# Patient Record
Sex: MALE | Race: WHITE | HISPANIC OR LATINO | Employment: FULL TIME | URBAN - METROPOLITAN AREA
[De-identification: names, ages, dates, MRNs, and addresses within clinical notes are randomized per-mention and may not be internally consistent; named-entity substitution may affect disease eponyms.]

---

## 2020-06-19 ENCOUNTER — HOSPITAL ENCOUNTER (EMERGENCY)
Facility: MEDICAL CENTER | Age: 30
End: 2020-06-19
Attending: EMERGENCY MEDICINE
Payer: COMMERCIAL

## 2020-06-19 ENCOUNTER — APPOINTMENT (OUTPATIENT)
Dept: RADIOLOGY | Facility: MEDICAL CENTER | Age: 30
End: 2020-06-19
Attending: EMERGENCY MEDICINE
Payer: COMMERCIAL

## 2020-06-19 VITALS
TEMPERATURE: 97.7 F | WEIGHT: 195 LBS | BODY MASS INDEX: 27.2 KG/M2 | DIASTOLIC BLOOD PRESSURE: 66 MMHG | SYSTOLIC BLOOD PRESSURE: 108 MMHG | HEART RATE: 98 BPM | OXYGEN SATURATION: 94 % | RESPIRATION RATE: 16 BRPM

## 2020-06-19 DIAGNOSIS — R56.9 SEIZURE (HCC): ICD-10-CM

## 2020-06-19 DIAGNOSIS — G40.909 SEIZURE DISORDER (HCC): ICD-10-CM

## 2020-06-19 LAB
ALBUMIN SERPL BCP-MCNC: 4.7 G/DL (ref 3.2–4.9)
ALBUMIN/GLOB SERPL: 1.6 G/DL
ALP SERPL-CCNC: 81 U/L (ref 30–99)
ALT SERPL-CCNC: 41 U/L (ref 2–50)
ANION GAP SERPL CALC-SCNC: 18 MMOL/L (ref 7–16)
AST SERPL-CCNC: 32 U/L (ref 12–45)
BASOPHILS # BLD AUTO: 0.3 % (ref 0–1.8)
BASOPHILS # BLD: 0.05 K/UL (ref 0–0.12)
BILIRUB SERPL-MCNC: 0.3 MG/DL (ref 0.1–1.5)
BUN SERPL-MCNC: 14 MG/DL (ref 8–22)
CALCIUM SERPL-MCNC: 9.6 MG/DL (ref 8.5–10.5)
CHLORIDE SERPL-SCNC: 103 MMOL/L (ref 96–112)
CO2 SERPL-SCNC: 21 MMOL/L (ref 20–33)
CREAT SERPL-MCNC: 0.96 MG/DL (ref 0.5–1.4)
EOSINOPHIL # BLD AUTO: 0.03 K/UL (ref 0–0.51)
EOSINOPHIL NFR BLD: 0.2 % (ref 0–6.9)
ERYTHROCYTE [DISTWIDTH] IN BLOOD BY AUTOMATED COUNT: 35.9 FL (ref 35.9–50)
GLOBULIN SER CALC-MCNC: 3 G/DL (ref 1.9–3.5)
GLUCOSE SERPL-MCNC: 163 MG/DL (ref 65–99)
HCT VFR BLD AUTO: 46.8 % (ref 42–52)
HGB BLD-MCNC: 14.6 G/DL (ref 14–18)
IMM GRANULOCYTES # BLD AUTO: 0.08 K/UL (ref 0–0.11)
IMM GRANULOCYTES NFR BLD AUTO: 0.6 % (ref 0–0.9)
LYMPHOCYTES # BLD AUTO: 2.65 K/UL (ref 1–4.8)
LYMPHOCYTES NFR BLD: 18.3 % (ref 22–41)
MCH RBC QN AUTO: 22.7 PG (ref 27–33)
MCHC RBC AUTO-ENTMCNC: 31.2 G/DL (ref 33.7–35.3)
MCV RBC AUTO: 72.9 FL (ref 81.4–97.8)
MONOCYTES # BLD AUTO: 0.7 K/UL (ref 0–0.85)
MONOCYTES NFR BLD AUTO: 4.8 % (ref 0–13.4)
NEUTROPHILS # BLD AUTO: 10.97 K/UL (ref 1.82–7.42)
NEUTROPHILS NFR BLD: 75.8 % (ref 44–72)
NRBC # BLD AUTO: 0 K/UL
NRBC BLD-RTO: 0 /100 WBC
PLATELET # BLD AUTO: 292 K/UL (ref 164–446)
PMV BLD AUTO: 10.8 FL (ref 9–12.9)
POTASSIUM SERPL-SCNC: 3.9 MMOL/L (ref 3.6–5.5)
PROT SERPL-MCNC: 7.7 G/DL (ref 6–8.2)
RBC # BLD AUTO: 6.42 M/UL (ref 4.7–6.1)
SODIUM SERPL-SCNC: 142 MMOL/L (ref 135–145)
VALPROATE SERPL-MCNC: <2.8 UG/ML (ref 50–100)
WBC # BLD AUTO: 14.5 K/UL (ref 4.8–10.8)

## 2020-06-19 PROCEDURE — 700111 HCHG RX REV CODE 636 W/ 250 OVERRIDE (IP)

## 2020-06-19 PROCEDURE — 85025 COMPLETE CBC W/AUTO DIFF WBC: CPT

## 2020-06-19 PROCEDURE — 80053 COMPREHEN METABOLIC PANEL: CPT

## 2020-06-19 PROCEDURE — 80164 ASSAY DIPROPYLACETIC ACD TOT: CPT

## 2020-06-19 PROCEDURE — 70450 CT HEAD/BRAIN W/O DYE: CPT

## 2020-06-19 PROCEDURE — 99285 EMERGENCY DEPT VISIT HI MDM: CPT

## 2020-06-19 PROCEDURE — A9270 NON-COVERED ITEM OR SERVICE: HCPCS | Performed by: EMERGENCY MEDICINE

## 2020-06-19 PROCEDURE — 96372 THER/PROPH/DIAG INJ SC/IM: CPT

## 2020-06-19 PROCEDURE — 700102 HCHG RX REV CODE 250 W/ 637 OVERRIDE(OP): Performed by: EMERGENCY MEDICINE

## 2020-06-19 RX ORDER — VALPROIC ACID 250 MG/1
500 CAPSULE, LIQUID FILLED ORAL ONCE
Status: COMPLETED | OUTPATIENT
Start: 2020-06-19 | End: 2020-06-19

## 2020-06-19 RX ORDER — HALOPERIDOL 5 MG/ML
5 INJECTION INTRAMUSCULAR ONCE
Status: COMPLETED | OUTPATIENT
Start: 2020-06-19 | End: 2020-06-19

## 2020-06-19 RX ORDER — MIDAZOLAM HYDROCHLORIDE 1 MG/ML
5 INJECTION INTRAMUSCULAR; INTRAVENOUS ONCE
Status: COMPLETED | OUTPATIENT
Start: 2020-06-19 | End: 2020-06-19

## 2020-06-19 RX ORDER — DIVALPROEX SODIUM 500 MG/1
500 TABLET, DELAYED RELEASE ORAL DAILY
Qty: 60 TAB | Refills: 0 | Status: SHIPPED | OUTPATIENT
Start: 2020-06-19 | End: 2021-02-18

## 2020-06-19 RX ORDER — HALOPERIDOL 5 MG/ML
INJECTION INTRAMUSCULAR
Status: COMPLETED
Start: 2020-06-19 | End: 2020-06-19

## 2020-06-19 RX ORDER — MIDAZOLAM HYDROCHLORIDE 1 MG/ML
INJECTION INTRAMUSCULAR; INTRAVENOUS
Status: COMPLETED
Start: 2020-06-19 | End: 2020-06-19

## 2020-06-19 RX ORDER — LORAZEPAM 2 MG/ML
2 INJECTION INTRAMUSCULAR
Status: DISCONTINUED | OUTPATIENT
Start: 2020-06-19 | End: 2020-06-20 | Stop reason: HOSPADM

## 2020-06-19 RX ADMIN — HALOPERIDOL 5 MG: 5 INJECTION INTRAMUSCULAR at 19:00

## 2020-06-19 RX ADMIN — VALPROIC ACID 500 MG: 250 CAPSULE, LIQUID FILLED ORAL at 23:06

## 2020-06-19 RX ADMIN — MIDAZOLAM HYDROCHLORIDE 5 MG: 1 INJECTION INTRAMUSCULAR; INTRAVENOUS at 19:00

## 2020-06-19 RX ADMIN — HALOPERIDOL LACTATE 5 MG: 5 INJECTION, SOLUTION INTRAMUSCULAR at 19:00

## 2020-06-19 RX ADMIN — MIDAZOLAM HYDROCHLORIDE 5 MG: 1 INJECTION, SOLUTION INTRAMUSCULAR; INTRAVENOUS at 19:00
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
## 2020-06-20 NOTE — ED NOTES
Pt discharge home. Pt given discharge instructions and prescription. Pt verbalized understanding, all questions answered, vss upon d/c. Pt steady gait during ambulation.

## 2020-06-20 NOTE — ED PROVIDER NOTES
ED Provider Note    Medical decision making    Please see Dr. Harris note for the history and physical.  The patient was signed out to my care after he presented to the emergency department in an altered state.  Suspected to be from a postictal state.  The patient has had significant improvement.  He states that he has had seizures in the distant past but he stopped his medication 2 years ago as he has been seizure-free for many years.  He states he has taken Depakote in the past.  His Depakote level is not therapeutic as he has not been taken the medication.  The patient will receive Depakote 500 mg orally this evening.  He did receive 5 mg of Versed via EMS and this should help prevent further seizures throughout the evening.  CT scan of the head was performed and there is no evidence of intracranial abnormalities.  Laboratory analysis does not show any evidence of metabolic disturbance that could decrease his seizure threshold.  Clinically do not appreciate any evidence of an infection.  As mentioned above the patient is now alert and appropriate.  He is unaware of any injuries from the seizure.  We will contact his wife to come pick him up and will write a prescription for Depakote.  He is seen by Dr. Rush in the past and he will receive a referral back to see him from a neurologic standpoint.  The patient will return to the emergency department for any further seizure activity or concern.    Impression  1.Altered mental status  2.  Suspect secondary to postictal state    Disposition  Patient will be discharged in stable condition

## 2020-10-22 ENCOUNTER — APPOINTMENT (OUTPATIENT)
Dept: RADIOLOGY | Facility: MEDICAL CENTER | Age: 30
End: 2020-10-22
Attending: EMERGENCY MEDICINE
Payer: COMMERCIAL

## 2020-10-22 ENCOUNTER — HOSPITAL ENCOUNTER (EMERGENCY)
Facility: MEDICAL CENTER | Age: 30
End: 2020-10-22
Attending: EMERGENCY MEDICINE
Payer: COMMERCIAL

## 2020-10-22 ENCOUNTER — OFFICE VISIT (OUTPATIENT)
Dept: URGENT CARE | Facility: CLINIC | Age: 30
End: 2020-10-22
Payer: COMMERCIAL

## 2020-10-22 VITALS
OXYGEN SATURATION: 97 % | BODY MASS INDEX: 29.96 KG/M2 | HEIGHT: 71 IN | SYSTOLIC BLOOD PRESSURE: 132 MMHG | HEART RATE: 82 BPM | RESPIRATION RATE: 16 BRPM | WEIGHT: 214 LBS | DIASTOLIC BLOOD PRESSURE: 88 MMHG | TEMPERATURE: 97.9 F

## 2020-10-22 VITALS
BODY MASS INDEX: 30.1 KG/M2 | SYSTOLIC BLOOD PRESSURE: 123 MMHG | HEIGHT: 71 IN | OXYGEN SATURATION: 94 % | TEMPERATURE: 97.8 F | DIASTOLIC BLOOD PRESSURE: 73 MMHG | HEART RATE: 111 BPM | WEIGHT: 215 LBS | RESPIRATION RATE: 20 BRPM

## 2020-10-22 DIAGNOSIS — J45.909 REACTIVE AIRWAY DISEASE WITHOUT COMPLICATION, UNSPECIFIED ASTHMA SEVERITY, UNSPECIFIED WHETHER PERSISTENT: ICD-10-CM

## 2020-10-22 DIAGNOSIS — R06.02 SHORTNESS OF BREATH: ICD-10-CM

## 2020-10-22 DIAGNOSIS — J98.11 ATELECTASIS: ICD-10-CM

## 2020-10-22 DIAGNOSIS — J98.01 BRONCHOSPASM: ICD-10-CM

## 2020-10-22 LAB
ANION GAP SERPL CALC-SCNC: 13 MMOL/L (ref 7–16)
BASOPHILS # BLD AUTO: 0.6 % (ref 0–1.8)
BASOPHILS # BLD: 0.05 K/UL (ref 0–0.12)
BUN SERPL-MCNC: 11 MG/DL (ref 8–22)
CALCIUM SERPL-MCNC: 9.6 MG/DL (ref 8.5–10.5)
CHLORIDE SERPL-SCNC: 99 MMOL/L (ref 96–112)
CO2 SERPL-SCNC: 26 MMOL/L (ref 20–33)
COVID ORDER STATUS COVID19: NORMAL
CREAT SERPL-MCNC: 0.61 MG/DL (ref 0.5–1.4)
D DIMER PPP IA.FEU-MCNC: 0.41 UG/ML (FEU) (ref 0–0.5)
EKG IMPRESSION: NORMAL
EOSINOPHIL # BLD AUTO: 0.04 K/UL (ref 0–0.51)
EOSINOPHIL NFR BLD: 0.5 % (ref 0–6.9)
ERYTHROCYTE [DISTWIDTH] IN BLOOD BY AUTOMATED COUNT: 37.5 FL (ref 35.9–50)
GLUCOSE SERPL-MCNC: 144 MG/DL (ref 65–99)
HCT VFR BLD AUTO: 48.7 % (ref 42–52)
HGB BLD-MCNC: 15 G/DL (ref 14–18)
IMM GRANULOCYTES # BLD AUTO: 0.03 K/UL (ref 0–0.11)
IMM GRANULOCYTES NFR BLD AUTO: 0.4 % (ref 0–0.9)
LACTATE BLD-SCNC: 2 MMOL/L (ref 0.5–2)
LYMPHOCYTES # BLD AUTO: 2.34 K/UL (ref 1–4.8)
LYMPHOCYTES NFR BLD: 30.2 % (ref 22–41)
MCH RBC QN AUTO: 22.7 PG (ref 27–33)
MCHC RBC AUTO-ENTMCNC: 30.8 G/DL (ref 33.7–35.3)
MCV RBC AUTO: 73.8 FL (ref 81.4–97.8)
MONOCYTES # BLD AUTO: 0.53 K/UL (ref 0–0.85)
MONOCYTES NFR BLD AUTO: 6.8 % (ref 0–13.4)
NEUTROPHILS # BLD AUTO: 4.76 K/UL (ref 1.82–7.42)
NEUTROPHILS NFR BLD: 61.5 % (ref 44–72)
NRBC # BLD AUTO: 0 K/UL
NRBC BLD-RTO: 0 /100 WBC
PLATELET # BLD AUTO: 193 K/UL (ref 164–446)
PMV BLD AUTO: 11.8 FL (ref 9–12.9)
POTASSIUM SERPL-SCNC: 3.4 MMOL/L (ref 3.6–5.5)
RBC # BLD AUTO: 6.6 M/UL (ref 4.7–6.1)
SODIUM SERPL-SCNC: 138 MMOL/L (ref 135–145)
TROPONIN T SERPL-MCNC: <6 NG/L (ref 6–19)
WBC # BLD AUTO: 7.8 K/UL (ref 4.8–10.8)

## 2020-10-22 PROCEDURE — 99284 EMERGENCY DEPT VISIT MOD MDM: CPT

## 2020-10-22 PROCEDURE — A9270 NON-COVERED ITEM OR SERVICE: HCPCS | Performed by: EMERGENCY MEDICINE

## 2020-10-22 PROCEDURE — 84484 ASSAY OF TROPONIN QUANT: CPT

## 2020-10-22 PROCEDURE — 93005 ELECTROCARDIOGRAM TRACING: CPT | Performed by: EMERGENCY MEDICINE

## 2020-10-22 PROCEDURE — 93005 ELECTROCARDIOGRAM TRACING: CPT

## 2020-10-22 PROCEDURE — 700111 HCHG RX REV CODE 636 W/ 250 OVERRIDE (IP): Performed by: EMERGENCY MEDICINE

## 2020-10-22 PROCEDURE — 700102 HCHG RX REV CODE 250 W/ 637 OVERRIDE(OP): Performed by: EMERGENCY MEDICINE

## 2020-10-22 PROCEDURE — C9803 HOPD COVID-19 SPEC COLLECT: HCPCS | Performed by: EMERGENCY MEDICINE

## 2020-10-22 PROCEDURE — 87040 BLOOD CULTURE FOR BACTERIA: CPT | Mod: 91

## 2020-10-22 PROCEDURE — 71046 X-RAY EXAM CHEST 2 VIEWS: CPT

## 2020-10-22 PROCEDURE — 80048 BASIC METABOLIC PNL TOTAL CA: CPT

## 2020-10-22 PROCEDURE — 99202 OFFICE O/P NEW SF 15 MIN: CPT | Performed by: PHYSICIAN ASSISTANT

## 2020-10-22 PROCEDURE — 83605 ASSAY OF LACTIC ACID: CPT

## 2020-10-22 PROCEDURE — U0003 INFECTIOUS AGENT DETECTION BY NUCLEIC ACID (DNA OR RNA); SEVERE ACUTE RESPIRATORY SYNDROME CORONAVIRUS 2 (SARS-COV-2) (CORONAVIRUS DISEASE [COVID-19]), AMPLIFIED PROBE TECHNIQUE, MAKING USE OF HIGH THROUGHPUT TECHNOLOGIES AS DESCRIBED BY CMS-2020-01-R: HCPCS

## 2020-10-22 PROCEDURE — 85025 COMPLETE CBC W/AUTO DIFF WBC: CPT

## 2020-10-22 PROCEDURE — 94640 AIRWAY INHALATION TREATMENT: CPT

## 2020-10-22 PROCEDURE — 85379 FIBRIN DEGRADATION QUANT: CPT

## 2020-10-22 RX ORDER — PREDNISONE 50 MG/1
50 TABLET ORAL DAILY
Qty: 5 TAB | Refills: 0 | Status: SHIPPED | OUTPATIENT
Start: 2020-10-22 | End: 2020-10-27

## 2020-10-22 RX ORDER — PREDNISONE 20 MG/1
60 TABLET ORAL ONCE
Status: COMPLETED | OUTPATIENT
Start: 2020-10-22 | End: 2020-10-22

## 2020-10-22 RX ORDER — ALBUTEROL SULFATE 90 UG/1
8 AEROSOL, METERED RESPIRATORY (INHALATION) ONCE
Status: COMPLETED | OUTPATIENT
Start: 2020-10-22 | End: 2020-10-22

## 2020-10-22 RX ADMIN — ALBUTEROL SULFATE 8 PUFF: 90 AEROSOL, METERED RESPIRATORY (INHALATION) at 22:31

## 2020-10-22 RX ADMIN — PREDNISONE 60 MG: 20 TABLET ORAL at 23:11

## 2020-10-22 ASSESSMENT — ENCOUNTER SYMPTOMS
DIZZINESS: 0
ABDOMINAL PAIN: 0
SHORTNESS OF BREATH: 1
WHEEZING: 1
CHILLS: 0
COUGH: 0
VOMITING: 0
SPUTUM PRODUCTION: 0
FEVER: 0
SORE THROAT: 0
DIARRHEA: 0
NAUSEA: 0

## 2020-10-22 ASSESSMENT — FIBROSIS 4 INDEX
FIB4 SCORE: 0.51
FIB4 SCORE: 0.51

## 2020-10-23 LAB
SARS-COV-2 RNA RESP QL NAA+PROBE: NOTDETECTED
SPECIMEN SOURCE: NORMAL

## 2020-10-23 NOTE — ED TRIAGE NOTES
Chief Complaint   Patient presents with   • Recurrent Pneumonia     admitted to [facility] for pneumonia on 9/18 and discharged on 9/22, worsening symptoms over last week and a half   • Shortness of Breath       Pt ambulatory to triage with steady gait. Pt reports being diagnosed with pneumonia in September but reports that after being discharged from hospital and finishing antibiotics, pt began feeling short of breath again. Pt reports 4 days ago, pt's PCP prescribed him azithromycin and an inhaler, but pt reports he is not feeling better.  Pt notably short of breath in triage. Pt brought to room 23.

## 2020-10-23 NOTE — PROGRESS NOTES
Subjective:     Umesh Chang  is a 30 y.o. male who presents for Shortness of Breath (shortness of breath due to pneumonia)      Shortness of Breath  Associated symptoms include wheezing. Pertinent negatives include no abdominal pain, chest pain, ear pain, fever, leg swelling, rash, sore throat, sputum production or vomiting.   Patient comes to clinic describing recent hospitalization and diagnosis of pneumonia.  He states he was in [facility] following a seizure and aspiration pneumonia.  He was admitted to their ICU for a number of days around 4 to 5 weeks ago.  He states he was discharged with 10 days of antibiotics which he finished around 2-1/2 weeks ago.  He states he has had a progressive increase in shortness of breath and tight wheezy breathing.  He states this significantly worsens with any activity.  He contacted his primary care by telemedicine and was prescribed albuterol inhaler and azithromycin.  He is finished these medications and notices no improvement.  He states albuterol inhaler had no impact on his wheezy breathing.  Additionally his mother had a nebulizer for which she is tried and had no change in wheezy breathing.  He has audible inspiratory and expiratory wheezing from across the room while explaining his history.  He denies chest pain fevers or chills.  He denies much coughing but complains of worsened shortness of breath and wheezing.  He denies loss of taste or smell.  Denies nausea vomiting abdominal pain diarrhea or rash.  He notes no change with recent antibiotic, MDI or nebulizer.  He states his energy has improved since hospitalization.    Review of Systems   Constitutional: Negative for chills and fever.   HENT: Negative for congestion, ear pain and sore throat.    Respiratory: Positive for shortness of breath and wheezing. Negative for cough and sputum production.    Cardiovascular: Negative for chest pain and leg swelling.   Gastrointestinal: Negative for abdominal  "pain, diarrhea, nausea and vomiting.   Skin: Negative for rash.   Neurological: Negative for dizziness.       Medications:    • albuterol Nebu  • divalproex Tbec    Allergies: Patient has no known allergies.    Problem List: Umesh Chang has Seizure disorder (HCC); Aortic insufficiency; Bicuspid aortic valve; and Heart murmur on their problem list.    Surgical History:  No past surgical history on file.    Past Social Hx: Umesh Chang  reports that he has never smoked. He has never used smokeless tobacco. He reports that he does not use drugs.     Past Family Hx:  Umesh Chang family history includes Hypertension in his mother.     Problem list, medications, and allergies reviewed by myself today in Epic.     Objective:   /88   Pulse 82   Temp 36.6 °C (97.9 °F)   Resp 16   Ht 1.803 m (5' 11\")   Wt 97.1 kg (214 lb)   SpO2 97%   BMI 29.85 kg/m²     Physical Exam  Vitals signs and nursing note reviewed.   Constitutional:       General: He is not in acute distress.     Appearance: He is well-developed. He is not diaphoretic.   HENT:      Head: Normocephalic and atraumatic.      Right Ear: Tympanic membrane, ear canal and external ear normal.      Left Ear: Tympanic membrane, ear canal and external ear normal.      Nose: Nose normal.      Mouth/Throat:      Pharynx: Uvula midline. Posterior oropharyngeal erythema ( mild PND) present. No oropharyngeal exudate.      Tonsils: No tonsillar abscesses.   Eyes:      General: No scleral icterus.        Right eye: No discharge.         Left eye: No discharge.      Conjunctiva/sclera: Conjunctivae normal.   Neck:      Musculoskeletal: Neck supple.   Pulmonary:      Effort: Accessory muscle usage present. No respiratory distress.      Breath sounds: Wheezing ( All fields inspiratory and expiratory) present. No decreased breath sounds, rhonchi or rales.   Musculoskeletal: Normal range of motion.   Lymphadenopathy:      Cervical: Cervical adenopathy ( mild bilat) present. "   Skin:     General: Skin is warm and dry.      Coloration: Skin is not pale.   Neurological:      Mental Status: He is alert and oriented to person, place, and time.      Coordination: Coordination normal.         Assessment/Plan:   Assessment      1. Shortness of breath    2. Bronchospasm    Other orders  - albuterol (PROVENTIL) 2.5 mg/0.5 mL Nebu Soln; by Nebulization route ONCE (RT).    Patient pleasant 1 month status post hospitalization in ICU for aspiration pneumonia.  Has been treated with multiple antibiotics both inpatient and outpatient.  Has been treated by primary care with nebulizer and MDI and despite this patient still has continued shortness of breath and audible inspiratory and expiratory wheezing.    Patient is directed back to [facility] emergency room (across the street from this urgent care) for further care in the facility that treated him during his hospitalization.  I do feel patient requires higher level of care than I can offer here.  He does state he feels comfortable to drive across the street to present to the ER for care.    Patient has been directed to ER for further management/work up now, today.    I have worn an N95 mask, gloves and eye protection for the entire encounter with this patient.     Differential diagnosis, natural history, supportive care, and indications for immediate follow-up discussed.

## 2020-10-23 NOTE — NON-PROVIDER
"CHIEF COMPLAINT  Chief Complaint   Patient presents with   • Recurrent Pneumonia     admitted to [facility] for pneumonia on 9/18 and discharged on 9/22, worsening symptoms over last week and a half   • Shortness of Breath       HPI  Umesh Chang is a 30 y.o. male who presents with shortness of breath, and wheezing for the past week following a recent recovery from aspiration pneumonia in September which required intubation for several days, but was eventually liberated and discharged. Approximately, two weeks ago during a followup appointment with his PCP a CXR was collected and the patient was told that he had \"Walking Pneumonia\" and he could return to work. Approximately one week ago the patient had an abrupt onset of shortness of breath with activity, and when laying down; he had an event when awoke with severe dyspnea and called an ambulance, but subsequently decided to see his PCP in the morning; he states that his symptoms have not seemed to significantly worsen or improve since this point. He was prescribed a course of Azithromycin and an albuterol inhaler by his PCP 4 days ago. He states that his symptoms remain unchanged with this and the albuterol inhaler has not improved his shortness of breath. He states that he has rare cough that produces thick brown mucus, and that he feels \"Great\" aside from his shortness of breath.     REVIEW OF SYSTEMS  Positives as above. Pertinent negatives include fever/chills, hemoptysis, history of DVT, Hisotry of Malignancy, headache, dizziness, chest pain, changes in vision, muscle, and joint pain, lower extremity edema, nausea, vomiting, and diarrhea.     All other review of systems are negative    PAST MEDICAL HISTORY   has a past medical history of Benign heart murmur and Seizure disorder (HCC).    SOCIAL HISTORY  Social History     Tobacco Use   • Smoking status: Never Smoker   • Smokeless tobacco: Never Used   Substance and Sexual Activity   • Alcohol use: Not on file " "    Comment: one beer every 3 weeks or so   • Drug use: No   • Sexual activity: Not Currently       SURGICAL HISTORY  patient denies any surgical history    CURRENT MEDICATIONS  Home Medications    **Home medications have not yet been reviewed for this encounter**         ALLERGIES  No Known Allergies    PHYSICAL EXAM  VITAL SIGNS: /66   Pulse (!) 101   Temp 36.4 °C (97.6 °F) (Temporal)   Resp (!) 22   Ht 1.803 m (5' 11\")   Wt 97.5 kg (215 lb)   SpO2 94%   BMI 29.99 kg/m²    Pulse ox interpretation: 95% on room air with appropriate waveformI interpret this pulse ox as normal.  Constitutional: Alert and pleasant in no apparent distress.  HENT: Normocephalic, Atraumatic, MMM  Eyes: PERound. Conjunctiva normal, non-icteric.   Heart: Tachycardic, and rhythm, no murmurs. 2+ peripheral pulses;    Lungs: Expiratory wheeze in bases with rhonchi across all lung fields; no accessory muscle usage, patient is speaking in full sentences, bilaterally equal and adequate chest rise;   Abdomen: Non-tender, non-distended, normal bowel sounds  EXT: Moves all extremities, no deformity,  Skin: Warm, Dry, No erythema, No rash.   Neurologic: Alert and oriented, Grossly non-focal.       DIFFERENTIAL DIAGNOSIS AND WORK UP PLAN    This is a 30 y.o. male who presents with persistent shortness of breath for the last week that is concerning for pneumonia, myocardial infarction, and pulmonary embolism. Chest X-Ray, Lactic Acid, Blood Cultures, and Complete Blood Count will be to rule out pneumonia. EKG and troponin's collected for AMI, and D-Dimer for concern for pulmonary embolism.     Pertinent Lab Findings  Labs Reviewed   CBC WITH DIFFERENTIAL - Abnormal; Notable for the following components:       Result Value    RBC 6.60 (*)     MCV 73.8 (*)     MCH 22.7 (*)     MCHC 30.8 (*)     All other components within normal limits   BASIC METABOLIC PANEL - Abnormal; Notable for the following components:    Potassium 3.4 (*)     Glucose " "144 (*)     All other components within normal limits   LACTIC ACID   BLOOD CULTURE    Narrative:     Per Hospital Policy: Only change Specimen Src: to \"Line\" if  specified by physician order.   BLOOD CULTURE    Narrative:     Per Hospital Policy: Only change Specimen Src: to \"Line\" if  specified by physician order.   TROPONIN   COVID/SARS COV-2    Narrative:     Is patient being admitted?->No  Expected turn around time?->Routine (In-House PCR up to 24  hours)  Is this the patients First SARS CoV-2 test?->No  Is this patient employed in healthcare?->No  Is the patient symptomatic as defined by the CDC?->Yes  Date of symptom onset?->10/18/20  Is the patient hospitalized?->No  Is the patient a resident in a congregate care setting?->No  Is the patient pregnant?->No   ESTIMATED GFR   SARS-COV-2, PCR (IN-HOUSE)    Narrative:     Is patient being admitted?->No  Expected turn around time?->Routine (In-House PCR up to 24  hours)  Is this the patients First SARS CoV-2 test?->No  Is this patient employed in healthcare?->No  Is the patient symptomatic as defined by the CDC?->Yes  Date of symptom onset?->10/18/20  Is the patient hospitalized?->No  Is the patient a resident in a congregate care setting?->No  Is the patient pregnant?->No   D-DIMER       Radiology  DX-CHEST-2 VIEWS   Final Result         1.  Left basilar atelectasis, no focal infiltrate        The radiologist's interpretation of all radiological studies have been reviewed by me.    COURSE & MEDICAL DECISION MAKING  Mr. Chang is a 30 year old male who presented to the Emergency Department with difficulty breathing     Pertinent Labs & Imaging studies reviewed. (See chart for details)     2200: Patient seen at bedside: POC r/o Pneumonia, and AMI With CXR, Lactic Acid, CBC, Lactic Acid, and blood cultures collected. Wells score is 1.5 (Low Risk) r/t tachycardia, and suspicion is high for alternative diagnosis.  2230: D-dimer added r/t increased HR with respiration "   2245: Patient states that his dyspnea has improved mildly with albuterol with a spacer.   2300: Patient updated on the POC that r/t reactive airway disease. The need to complete his current ABX regimen, albuterol, and the use of Prednisone. The patient will be discharged home, but that he will need to return for new or worsening symptoms.     I verified that the patient was wearing a mask and I was wearing appropriate PPE every time I entered the room. The patient's mask was on the patient at all times during my encounter except for a brief view of the oropharynx.        FINAL IMPRESSION  1. Reactive airway disease without complication, unspecified asthma severity, unspecified whether persistent     2. Atelectasis                Electronically signed by: Darin Cervantes, Student, 10/22/2020 9:12 PM    This dictation has been created using voice recognition software and/or scribes. The accuracy of the dictation is limited by the abilities of the software and the expertise of the scribes. I expect there may be some errors of grammar and possibly content. I made every attempt to manually correct the errors within my dictation. However, errors related to voice recognition software and/or scribes may still exist and should be interpreted within the appropriate context.

## 2020-10-23 NOTE — ED NOTES
"Pt discharged home w/ ride, pt called ride while in room. Ambulated w/ steady gait, A&Ox4. IV discontinued and gauze placed, pt in possession of belongings. Pt provided discharge education and information pertaining to medications and follow up appointments. Pt received copy of discharge instructions and verbalized understanding. /73   Pulse (!) 111   Temp 36.6 °C (97.8 °F)   Resp 20   Ht 1.803 m (5' 11\")   Wt 97.5 kg (215 lb)   SpO2 94%   BMI 29.99 kg/m²   "

## 2020-10-23 NOTE — ED PROVIDER NOTES
"ED Provider Note    Attested Addendum        Attestation signed by Christine Aquino M.D. at 10/23/2020 2:15 AM   Patient was evaluated by myself at the bedside, he was tachycardic and borderline hypoxic he definitely has wheezing on examination, sounds like is not really using his albuterol inhaler right apparently he has had a bad history of possible intubation with pneumonia secondary to his seizures.  Breath sounds are equal bilaterally so this could be a Covid infection or worsening atrial pneumonia although I doubt it also could be pulmonary embolism although I doubt it secondary to his tachycardia and recent ICU stay he does have some risk factors so a D-dimer be performed.  This was normal there is no evidence of acute infection there is no evidence of pulmonary embolism he did have a positive response to his 8 puffs of albuterol he was taught how to use albuterol appropriately he will be sent home on oral steroids for likely reactive airway disease return to the emergency department for any new or worsening issues.  He was checked for Covid and his results will be done in 24 to 48 hours           []Hide copied text    []Mel for details  CHIEF COMPLAINT  Chief Complaint   Patient presents with   • Recurrent Pneumonia       admitted to [facility] for pneumonia on 9/18 and discharged on 9/22, worsening symptoms over last week and a half   • Shortness of Breath         HPI  Umesh Chang is a 30 y.o. male who presents with shortness of breath, and wheezing for the past week following a recent recovery from aspiration pneumonia in September which required intubation for several days, but was eventually liberated and discharged. Approximately, two weeks ago during a followup appointment with his PCP a CXR was collected and the patient was told that he had \"Walking Pneumonia\" and he could return to work. Approximately one week ago the patient had an abrupt onset of shortness of breath with activity, and when " "laying down; he had an event when awoke with severe dyspnea and called an ambulance, but subsequently decided to see his PCP in the morning; he states that his symptoms have not seemed to significantly worsen or improve since this point. He was prescribed a course of Azithromycin and an albuterol inhaler by his PCP 4 days ago. He states that his symptoms remain unchanged with this and the albuterol inhaler has not improved his shortness of breath. He states that he has rare cough that produces thick brown mucus, and that he feels \"Great\" aside from his shortness of breath.      REVIEW OF SYSTEMS  Positives as above. Pertinent negatives include fever/chills, hemoptysis, history of DVT, Hisotry of Malignancy, headache, dizziness, chest pain, changes in vision, muscle, and joint pain, lower extremity edema, nausea, vomiting, and diarrhea.     All other review of systems are negative     PAST MEDICAL HISTORY   has a past medical history of Benign heart murmur and Seizure disorder (HCC).     SOCIAL HISTORY  Social History            Tobacco Use   • Smoking status: Never Smoker   • Smokeless tobacco: Never Used   Substance and Sexual Activity   • Alcohol use: Not on file       Comment: one beer every 3 weeks or so   • Drug use: No   • Sexual activity: Not Currently         SURGICAL HISTORY  patient denies any surgical history     CURRENT MEDICATIONS  Home Medications    **Home medications have not yet been reviewed for this encounter**            ALLERGIES  No Known Allergies     PHYSICAL EXAM  VITAL SIGNS: /66   Pulse (!) 101   Temp 36.4 °C (97.6 °F) (Temporal)   Resp (!) 22   Ht 1.803 m (5' 11\")   Wt 97.5 kg (215 lb)   SpO2 94%   BMI 29.99 kg/m²    Pulse ox interpretation: 95% on room air with appropriate waveformI interpret this pulse ox as normal.  Constitutional: Alert and pleasant in no apparent distress.  HENT: Normocephalic, Atraumatic, MMM  Eyes: PERound. Conjunctiva normal, non-icteric.   Heart: " "Tachycardic, and rhythm, no murmurs. 2+ peripheral pulses;    Lungs: Expiratory wheeze in bases with rhonchi across all lung fields; no accessory muscle usage, patient is speaking in full sentences, bilaterally equal and adequate chest rise;   Abdomen: Non-tender, non-distended, normal bowel sounds  EXT: Moves all extremities, no deformity,  Skin: Warm, Dry, No erythema, No rash.   Neurologic: Alert and oriented, Grossly non-focal.         DIFFERENTIAL DIAGNOSIS AND WORK UP PLAN     This is a 30 y.o. male who presents with persistent shortness of breath for the last week that is concerning for pneumonia, myocardial infarction, and pulmonary embolism. Chest X-Ray, Lactic Acid, Blood Cultures, and Complete Blood Count will be to rule out pneumonia. EKG and troponin's collected for AMI, and D-Dimer for concern for pulmonary embolism.      Pertinent Lab Findings        Labs Reviewed   CBC WITH DIFFERENTIAL - Abnormal; Notable for the following components:       Result Value      RBC 6.60 (*)       MCV 73.8 (*)       MCH 22.7 (*)       MCHC 30.8 (*)       All other components within normal limits   BASIC METABOLIC PANEL - Abnormal; Notable for the following components:     Potassium 3.4 (*)       Glucose 144 (*)       All other components within normal limits   LACTIC ACID   BLOOD CULTURE     Narrative:      Per Hospital Policy: Only change Specimen Src: to \"Line\" if  specified by physician order.   BLOOD CULTURE     Narrative:      Per Hospital Policy: Only change Specimen Src: to \"Line\" if  specified by physician order.   TROPONIN   COVID/SARS COV-2     Narrative:      Is patient being admitted?->No  Expected turn around time?->Routine (In-House PCR up to 24  hours)  Is this the patients First SARS CoV-2 test?->No  Is this patient employed in healthcare?->No  Is the patient symptomatic as defined by the CDC?->Yes  Date of symptom onset?->10/18/20  Is the patient hospitalized?->No  Is the patient a resident in a " congregate care setting?->No  Is the patient pregnant?->No   ESTIMATED GFR   SARS-COV-2, PCR (IN-HOUSE)     Narrative:      Is patient being admitted?->No  Expected turn around time?->Routine (In-House PCR up to 24  hours)  Is this the patients First SARS CoV-2 test?->No  Is this patient employed in healthcare?->No  Is the patient symptomatic as defined by the CDC?->Yes  Date of symptom onset?->10/18/20  Is the patient hospitalized?->No  Is the patient a resident in a congregate care setting?->No  Is the patient pregnant?->No   D-DIMER         Radiology  DX-CHEST-2 VIEWS   Final Result           1.  Left basilar atelectasis, no focal infiltrate          The radiologist's interpretation of all radiological studies have been reviewed by me.     COURSE & MEDICAL DECISION MAKING  Mr. Chang is a 30 year old male who presented to the Emergency Department with difficulty breathing      Pertinent Labs & Imaging studies reviewed. (See chart for details)      2200: Patient seen at bedside: POC r/o Pneumonia, and AMI With CXR, Lactic Acid, CBC, Lactic Acid, and blood cultures collected. Wells score is 1.5 (Low Risk) r/t tachycardia, and suspicion is high for alternative diagnosis.  2230: D-dimer added r/t increased HR with respiration   2245: Patient states that his dyspnea has improved mildly with albuterol with a spacer.   2300: Patient updated on the POC that r/t reactive airway disease. The need to complete his current ABX regimen, albuterol, and the use of Prednisone. The patient will be discharged home, but that he will need to return for new or worsening symptoms.      I verified that the patient was wearing a mask and I was wearing appropriate PPE every time I entered the room. The patient's mask was on the patient at all times during my encounter except for a brief view of the oropharynx.           FINAL IMPRESSION  1. Reactive airway disease without complication, unspecified asthma severity, unspecified whether  persistent      2. Atelectasis                     Electronically signed by: Darin Cervantes, Student, 10/22/2020 9:12 PM     This dictation has been created using voice recognition software and/or scribes. The accuracy of the dictation is limited by the abilities of the software and the expertise of the scribes. I expect there may be some errors of grammar and possibly content. I made every attempt to manually correct the errors within my dictation. However, errors related to voice recognition software and/or scribes may still exist and should be interpreted within the appropriate context.            Cosigned by: Christine Aquino M.D. at 10/23/2020  2:15 AM

## 2020-10-23 NOTE — DISCHARGE INSTRUCTIONS
You do not have asthma per se but you are definitely wheezing in the category of reactive airway disease.  This can happen sometimes after intubation and infection.  There is no evidence of current infection however you did finish your antibiotics that you started due to concern for bacterial resistance in the future.  There is no evidence of pulmonary embolism nor infectious process of the heart or around the heart.  Take your steroids until completed return emergency department for any new or worsening issues.    Use your inhaler 2 to 6 puffs every 2-4 hours as needed

## 2020-10-28 LAB
BACTERIA BLD CULT: NORMAL
BACTERIA BLD CULT: NORMAL
SIGNIFICANT IND 70042: NORMAL
SIGNIFICANT IND 70042: NORMAL
SITE SITE: NORMAL
SITE SITE: NORMAL
SOURCE SOURCE: NORMAL
SOURCE SOURCE: NORMAL

## 2020-11-19 ENCOUNTER — ANESTHESIA EVENT (OUTPATIENT)
Dept: SURGERY | Facility: MEDICAL CENTER | Age: 30
End: 2020-11-19
Payer: COMMERCIAL

## 2020-11-19 ENCOUNTER — PRE-ADMISSION TESTING (OUTPATIENT)
Dept: ADMISSIONS | Facility: MEDICAL CENTER | Age: 30
End: 2020-11-19
Attending: OTOLARYNGOLOGY
Payer: COMMERCIAL

## 2020-11-19 DIAGNOSIS — Z01.812 PRE-OPERATIVE LABORATORY EXAMINATION: ICD-10-CM

## 2020-11-19 LAB
COVID ORDER STATUS COVID19: NORMAL
SARS-COV-2 RDRP RESP QL NAA+PROBE: NOTDETECTED
SPECIMEN SOURCE: NORMAL

## 2020-11-19 PROCEDURE — U0004 COV-19 TEST NON-CDC HGH THRU: HCPCS

## 2020-11-20 ENCOUNTER — ANESTHESIA (OUTPATIENT)
Dept: SURGERY | Facility: MEDICAL CENTER | Age: 30
End: 2020-11-20
Payer: COMMERCIAL

## 2020-11-20 ENCOUNTER — HOSPITAL ENCOUNTER (OUTPATIENT)
Facility: MEDICAL CENTER | Age: 30
End: 2020-11-20
Attending: OTOLARYNGOLOGY | Admitting: OTOLARYNGOLOGY
Payer: COMMERCIAL

## 2020-11-20 VITALS
WEIGHT: 211.64 LBS | OXYGEN SATURATION: 94 % | BODY MASS INDEX: 29.63 KG/M2 | RESPIRATION RATE: 20 BRPM | TEMPERATURE: 97.9 F | HEIGHT: 71 IN | HEART RATE: 98 BPM | SYSTOLIC BLOOD PRESSURE: 135 MMHG | DIASTOLIC BLOOD PRESSURE: 84 MMHG

## 2020-11-20 PROBLEM — J39.8 TRACHEAL STENOSIS: Status: ACTIVE | Noted: 2020-11-20

## 2020-11-20 PROCEDURE — 160009 HCHG ANES TIME/MIN: Performed by: OTOLARYNGOLOGY

## 2020-11-20 PROCEDURE — 700101 HCHG RX REV CODE 250

## 2020-11-20 PROCEDURE — 160046 HCHG PACU - 1ST 60 MINS PHASE II: Performed by: OTOLARYNGOLOGY

## 2020-11-20 PROCEDURE — 700105 HCHG RX REV CODE 258: Performed by: OTOLARYNGOLOGY

## 2020-11-20 PROCEDURE — 700102 HCHG RX REV CODE 250 W/ 637 OVERRIDE(OP): Performed by: OTOLARYNGOLOGY

## 2020-11-20 PROCEDURE — 700102 HCHG RX REV CODE 250 W/ 637 OVERRIDE(OP): Performed by: ANESTHESIOLOGY

## 2020-11-20 PROCEDURE — A9270 NON-COVERED ITEM OR SERVICE: HCPCS | Performed by: ANESTHESIOLOGY

## 2020-11-20 PROCEDURE — 700111 HCHG RX REV CODE 636 W/ 250 OVERRIDE (IP): Performed by: ANESTHESIOLOGY

## 2020-11-20 PROCEDURE — 502573 HCHG PACK, ENT: Performed by: OTOLARYNGOLOGY

## 2020-11-20 PROCEDURE — A9270 NON-COVERED ITEM OR SERVICE: HCPCS | Performed by: OTOLARYNGOLOGY

## 2020-11-20 PROCEDURE — 160025 RECOVERY II MINUTES (STATS): Performed by: OTOLARYNGOLOGY

## 2020-11-20 PROCEDURE — 160002 HCHG RECOVERY MINUTES (STAT): Performed by: OTOLARYNGOLOGY

## 2020-11-20 PROCEDURE — 160039 HCHG SURGERY MINUTES - EA ADDL 1 MIN LEVEL 3: Performed by: OTOLARYNGOLOGY

## 2020-11-20 PROCEDURE — 160048 HCHG OR STATISTICAL LEVEL 1-5: Performed by: OTOLARYNGOLOGY

## 2020-11-20 PROCEDURE — 700101 HCHG RX REV CODE 250: Performed by: ANESTHESIOLOGY

## 2020-11-20 PROCEDURE — 160035 HCHG PACU - 1ST 60 MINS PHASE I: Performed by: OTOLARYNGOLOGY

## 2020-11-20 PROCEDURE — 160028 HCHG SURGERY MINUTES - 1ST 30 MINS LEVEL 3: Performed by: OTOLARYNGOLOGY

## 2020-11-20 PROCEDURE — C1726 CATH, BAL DIL, NON-VASCULAR: HCPCS | Performed by: OTOLARYNGOLOGY

## 2020-11-20 RX ORDER — ONDANSETRON 2 MG/ML
INJECTION INTRAMUSCULAR; INTRAVENOUS PRN
Status: DISCONTINUED | OUTPATIENT
Start: 2020-11-20 | End: 2020-11-20 | Stop reason: SURG

## 2020-11-20 RX ORDER — DEXAMETHASONE SODIUM PHOSPHATE 4 MG/ML
INJECTION, SOLUTION INTRA-ARTICULAR; INTRALESIONAL; INTRAMUSCULAR; INTRAVENOUS; SOFT TISSUE PRN
Status: DISCONTINUED | OUTPATIENT
Start: 2020-11-20 | End: 2020-11-20 | Stop reason: SURG

## 2020-11-20 RX ORDER — LABETALOL HYDROCHLORIDE 5 MG/ML
5 INJECTION, SOLUTION INTRAVENOUS
Status: DISCONTINUED | OUTPATIENT
Start: 2020-11-20 | End: 2020-11-20 | Stop reason: HOSPADM

## 2020-11-20 RX ORDER — MORPHINE SULFATE 10 MG/ML
5 INJECTION, SOLUTION INTRAMUSCULAR; INTRAVENOUS
Status: DISCONTINUED | OUTPATIENT
Start: 2020-11-20 | End: 2020-11-20 | Stop reason: HOSPADM

## 2020-11-20 RX ORDER — OXYCODONE HCL 5 MG/5 ML
10 SOLUTION, ORAL ORAL
Status: DISCONTINUED | OUTPATIENT
Start: 2020-11-20 | End: 2020-11-20 | Stop reason: HOSPADM

## 2020-11-20 RX ORDER — OXYCODONE HCL 5 MG/5 ML
5 SOLUTION, ORAL ORAL
Status: DISCONTINUED | OUTPATIENT
Start: 2020-11-20 | End: 2020-11-20 | Stop reason: HOSPADM

## 2020-11-20 RX ORDER — IBUPROFEN 600 MG/1
600 TABLET ORAL ONCE
Status: COMPLETED | OUTPATIENT
Start: 2020-11-20 | End: 2020-11-20

## 2020-11-20 RX ORDER — ONDANSETRON 2 MG/ML
4 INJECTION INTRAMUSCULAR; INTRAVENOUS
Status: DISCONTINUED | OUTPATIENT
Start: 2020-11-20 | End: 2020-11-20

## 2020-11-20 RX ORDER — SODIUM CHLORIDE, SODIUM LACTATE, POTASSIUM CHLORIDE, CALCIUM CHLORIDE 600; 310; 30; 20 MG/100ML; MG/100ML; MG/100ML; MG/100ML
INJECTION, SOLUTION INTRAVENOUS CONTINUOUS
Status: DISCONTINUED | OUTPATIENT
Start: 2020-11-20 | End: 2020-11-20 | Stop reason: HOSPADM

## 2020-11-20 RX ORDER — TRIAMCINOLONE ACETONIDE 40 MG/ML
INJECTION, SUSPENSION INTRA-ARTICULAR; INTRAMUSCULAR
Status: DISCONTINUED
Start: 2020-11-20 | End: 2020-11-20 | Stop reason: HOSPADM

## 2020-11-20 RX ORDER — ACETAMINOPHEN 500 MG
1000 TABLET ORAL ONCE
Status: COMPLETED | OUTPATIENT
Start: 2020-11-20 | End: 2020-11-20

## 2020-11-20 RX ORDER — MORPHINE SULFATE 4 MG/ML
2 INJECTION, SOLUTION INTRAMUSCULAR; INTRAVENOUS
Status: DISCONTINUED | OUTPATIENT
Start: 2020-11-20 | End: 2020-11-20 | Stop reason: HOSPADM

## 2020-11-20 RX ORDER — MORPHINE SULFATE 4 MG/ML
1 INJECTION, SOLUTION INTRAMUSCULAR; INTRAVENOUS
Status: DISCONTINUED | OUTPATIENT
Start: 2020-11-20 | End: 2020-11-20 | Stop reason: HOSPADM

## 2020-11-20 RX ORDER — ONDANSETRON 2 MG/ML
4 INJECTION INTRAMUSCULAR; INTRAVENOUS
Status: DISCONTINUED | OUTPATIENT
Start: 2020-11-20 | End: 2020-11-20 | Stop reason: HOSPADM

## 2020-11-20 RX ORDER — DIPHENHYDRAMINE HYDROCHLORIDE 50 MG/ML
12.5 INJECTION INTRAMUSCULAR; INTRAVENOUS
Status: DISCONTINUED | OUTPATIENT
Start: 2020-11-20 | End: 2020-11-20 | Stop reason: HOSPADM

## 2020-11-20 RX ORDER — KETAMINE HYDROCHLORIDE 50 MG/ML
INJECTION, SOLUTION INTRAMUSCULAR; INTRAVENOUS PRN
Status: DISCONTINUED | OUTPATIENT
Start: 2020-11-20 | End: 2020-11-20 | Stop reason: SURG

## 2020-11-20 RX ORDER — HALOPERIDOL 5 MG/ML
1 INJECTION INTRAMUSCULAR
Status: DISCONTINUED | OUTPATIENT
Start: 2020-11-20 | End: 2020-11-20

## 2020-11-20 RX ORDER — LIDOCAINE HYDROCHLORIDE 20 MG/ML
INJECTION, SOLUTION EPIDURAL; INFILTRATION; INTRACAUDAL; PERINEURAL PRN
Status: DISCONTINUED | OUTPATIENT
Start: 2020-11-20 | End: 2020-11-20 | Stop reason: SURG

## 2020-11-20 RX ORDER — SUCCINYLCHOLINE/SOD CL,ISO/PF 200MG/10ML
SYRINGE (ML) INTRAVENOUS PRN
Status: DISCONTINUED | OUTPATIENT
Start: 2020-11-20 | End: 2020-11-20 | Stop reason: SURG

## 2020-11-20 RX ORDER — DIPHENHYDRAMINE HYDROCHLORIDE 50 MG/ML
12.5 INJECTION INTRAMUSCULAR; INTRAVENOUS
Status: DISCONTINUED | OUTPATIENT
Start: 2020-11-20 | End: 2020-11-20

## 2020-11-20 RX ORDER — HALOPERIDOL 5 MG/ML
1 INJECTION INTRAMUSCULAR
Status: DISCONTINUED | OUTPATIENT
Start: 2020-11-20 | End: 2020-11-20 | Stop reason: HOSPADM

## 2020-11-20 RX ADMIN — SODIUM CHLORIDE, POTASSIUM CHLORIDE, SODIUM LACTATE AND CALCIUM CHLORIDE: 600; 310; 30; 20 INJECTION, SOLUTION INTRAVENOUS at 14:07

## 2020-11-20 RX ADMIN — SODIUM CHLORIDE, POTASSIUM CHLORIDE, SODIUM LACTATE AND CALCIUM CHLORIDE: 600; 310; 30; 20 INJECTION, SOLUTION INTRAVENOUS at 18:16

## 2020-11-20 RX ADMIN — KETAMINE HYDROCHLORIDE 25 MG: 50 INJECTION INTRAMUSCULAR; INTRAVENOUS at 18:21

## 2020-11-20 RX ADMIN — DEXAMETHASONE SODIUM PHOSPHATE 8 MG: 4 INJECTION, SOLUTION INTRA-ARTICULAR; INTRALESIONAL; INTRAMUSCULAR; INTRAVENOUS; SOFT TISSUE at 18:40

## 2020-11-20 RX ADMIN — IBUPROFEN 600 MG: 600 TABLET, FILM COATED ORAL at 19:26

## 2020-11-20 RX ADMIN — FENTANYL CITRATE 50 MCG: 50 INJECTION, SOLUTION INTRAMUSCULAR; INTRAVENOUS at 19:31

## 2020-11-20 RX ADMIN — KETAMINE HYDROCHLORIDE 50 MG: 50 INJECTION INTRAMUSCULAR; INTRAVENOUS at 18:31

## 2020-11-20 RX ADMIN — ALBUTEROL SULFATE 2.5 MG: 2.5 SOLUTION RESPIRATORY (INHALATION) at 15:19

## 2020-11-20 RX ADMIN — LIDOCAINE HYDROCHLORIDE 100 MG: 20 INJECTION, SOLUTION EPIDURAL; INFILTRATION; INTRACAUDAL at 18:21

## 2020-11-20 RX ADMIN — PROPOFOL 200 MG: 10 INJECTION, EMULSION INTRAVENOUS at 18:21

## 2020-11-20 RX ADMIN — ACETAMINOPHEN 1000 MG: 500 TABLET ORAL at 19:24

## 2020-11-20 RX ADMIN — ONDANSETRON 4 MG: 2 INJECTION INTRAMUSCULAR; INTRAVENOUS at 18:40

## 2020-11-20 RX ADMIN — Medication 30 MG: at 18:43

## 2020-11-20 RX ADMIN — GLYCOPYRROLATE 0.2 MG: 0.2 INJECTION INTRAMUSCULAR; INTRAVENOUS at 18:46

## 2020-11-20 RX ADMIN — POVIDONE IODINE 15 ML: 100 SOLUTION TOPICAL at 14:06

## 2020-11-20 ASSESSMENT — FIBROSIS 4 INDEX: FIB4 SCORE: 0.78

## 2020-11-20 NOTE — ANESTHESIA PREPROCEDURE EVALUATION
31 yo s/p intubation for pneumonia Sept. 2020.  Now has difficulty breathing.    Relevant Problems   NEURO   (+) Seizure disorder (HCC)      CARDIAC   (+) Aortic insufficiency   (+) Heart murmur       Physical Exam    Airway   Mallampati: III  TM distance: >3 FB  Neck ROM: full       Cardiovascular - normal exam  Rhythm: regular  Rate: normal  (+) murmur     Dental - normal exam           Pulmonary - normal exam  Breath sounds clear to auscultation  (+) wheezes     Abdominal    Neurological - normal exam         Other findings: Pt has bicuspid aortic valve.  Will give pt breathing tx for wheezing.        Recent aspiration pneumonia d/t seizure.    Anesthesia Plan    ASA 3       Plan - general       Airway plan will be ETT                  Informed Consent:

## 2020-11-21 NOTE — OP REPORT
DATE OF SERVICE:  11/20/2020    SURGEON:  Johnny Martines MD.    ASSISTANT:  Gabbie Sinclair MD    ANESTHESIOLOGIST:  Shankar Ortiz MD    PREOPERATIVE DIAGNOSIS:  Subglottic stenosis.    POSTOPERATIVE DIAGNOSIS:  Tracheal stenosis.    INDICATIONS FOR PROCEDURE:  The patient was intubated for either surgery or   pneumonia 2 months ago and started having trouble breathing 4 weeks ago.  He   presented to my office 2 days ago with biphasic stridor and stenosis below the   glottis was seen on flexible laryngoscopy.  Risks, benefits, and alternatives   to the procedure were discussed with the patient preoperatively and he gave   his informed written consent.    OPERATIVE FINDINGS:  3-4 mm stenosis of the trachea.  The only opening was   posteriorly along the tracheal wall.  This was first incised at the 12 o'clock   position with a Gold laser and then dilated to 15 mm with the Cook balloon   catheter.    DESCRIPTION OF PROCEDURE:  The patient was identified in the preoperative   holding area and brought to the operating room.  A general anesthetic was   administered and the patient was turned 90 degrees towards the surgeon.  A   laryngeal mask airway was placed and then the modified subglottiscope and a   tooth guard were used to gain visualization of the vocal cords.  The 5 mm   telescope was placed through the cords and photographs were taken.  The 5 mm   scope could not be passed through the tracheal stenosis.  A 40 mg of Kenalog   was injected through a urologic needle into the scar band and then the gold   laser on a setting of 6 was used to make a 12 o'clock incision through a thick   scar band.  The patient desaturated to 85, so the laryngeal mask airway was   replaced for several minutes.  After reoxygenating, the balloon catheter was   first dilated to 13 mm for 1 minute and then secondly to 15 mm for 1 minute.    Photodocumentation was obtained and the patient was awoken and brought to the   postanesthesia  care unit in good condition.    ESTIMATED BLOOD LOSS:  5 mL.    URINE OUTPUT:  Not recorded.    COMPLICATIONS:  None.    SPECIMENS REMOVED:  None.       ____________________________________     Johnny Martines MD    ART / NTS    DD:  11/20/2020 19:08:39  DT:  11/20/2020 22:10:19    D#:  [id number]  Job#:  [id number]

## 2020-11-21 NOTE — ANESTHESIA POSTPROCEDURE EVALUATION
Patient: Umesh Chang    Procedure Summary     Date: 11/20/20 Room / Location: [facility] ROOM 21 / SURGERY SAME DAY [facility]    Anesthesia Start: 1816 Anesthesia Stop: 1913    Procedure: LARYNGOSCOPY - DIRECT W/BALLOON DILATION (N/A Throat) Diagnosis:       Tracheal stenosis      (TRACHEAL STENOSIS)    Surgeons: Johnny Martines M.D. Responsible Provider: Shankar Ortiz M.D.    Anesthesia Type: general ASA Status: 2          Final Anesthesia Type: general  Last vitals  BP   Blood Pressure: 144/101    Temp   37.2 °C (98.9 °F)    Pulse   Pulse: (!) 110   Resp   20    SpO2   99 %      Anesthesia Post Evaluation    Patient location during evaluation: PACU  Patient participation: complete - patient participated  Level of consciousness: awake and alert    Airway patency: patent  Anesthetic complications: no  Cardiovascular status: hemodynamically stable  Respiratory status: acceptable  Hydration status: euvolemic    PONV: none           Nurse Pain Score: 0 (NPRS)

## 2020-11-21 NOTE — ANESTHESIA POSTPROCEDURE EVALUATION
Patient: Umesh Chang    Procedure Summary     Date: 11/20/20 Room / Location: [facility] ROOM 21 / SURGERY SAME DAY [facility]    Anesthesia Start: 1816 Anesthesia Stop: 1913    Procedure: LARYNGOSCOPY - DIRECT W/BALLOON DILATION (N/A Throat) Diagnosis:       Tracheal stenosis      (TRACHEAL STENOSIS)    Surgeons: Johnny Martines M.D. Responsible Provider: Shankar Ortiz M.D.    Anesthesia Type: general ASA Status: 2          Final Anesthesia Type: general  Last vitals  BP   Blood Pressure: 144/101    Temp   37.2 °C (98.9 °F)    Pulse   Pulse: (!) 110   Resp   20    SpO2   99 %      Anesthesia Post Evaluation    Patient location during evaluation: PACU  Patient participation: complete - patient participated  Level of consciousness: awake and alert    Airway patency: patent  Anesthetic complications: no  Cardiovascular status: hemodynamically stable  Respiratory status: acceptable  Hydration status: euvolemic    PONV: none           Nurse Pain Score: 0 (NPRS)

## 2020-11-21 NOTE — DISCHARGE INSTRUCTIONS
ACTIVITY: Rest and take it easy for the first 24 hours.  A responsible adult is recommended to remain with you during that time.  It is normal to feel sleepy.  We encourage you to not do anything that requires balance, judgment or coordination.    MILD FLU-LIKE SYMPTOMS ARE NORMAL. YOU MAY EXPERIENCE GENERALIZED MUSCLE ACHES, THROAT IRRITATION, HEADACHE AND/OR SOME NAUSEA.    FOR 24 HOURS DO NOT:  Drive, operate machinery or run household appliances.  Drink beer or alcoholic beverages.   Make important decisions or sign legal documents.    SPECIAL INSTRUCTIONS: see attached handout. Call Dr. Martines if you develop CHEST PAIN or FEVERS    DIET: To avoid nausea, slowly advance diet as tolerated, avoiding spicy or greasy foods for the first day.  Add more substantial food to your diet according to your physician's instructions.  Babies can be fed formula or breast milk as soon as they are hungry.  INCREASE FLUIDS AND FIBER TO AVOID CONSTIPATION.    SURGICAL DRESSING/BATHING: ok to shower tomorrow    FOLLOW-UP APPOINTMENT:  A follow-up appointment should be arranged with your doctor in. Call to schedule.    You should CALL YOUR PHYSICIAN if you develop:  Fever greater than 101 degrees F.  Pain not relieved by medication, or persistent nausea or vomiting.  Excessive bleeding (blood soaking through dressing) or unexpected drainage from the wound.  Extreme redness or swelling around the incision site, drainage of pus or foul smelling drainage.  Inability to urinate or empty your bladder within 8 hours.  Problems with breathing or chest pain.    You should call 911 if you develop problems with breathing or chest pain.  If you are unable to contact your doctor or surgical center, you should go to the nearest emergency room or urgent care center.  Physician's telephone #: Dr. Martines [phone]    If any questions arise, call your doctor.  If your doctor is not available, please feel free to call the Surgical Center  at [phone]. The Contact Center is open Monday through Friday 7AM to 5PM and may speak to a nurse at [phone], or toll free at [phone].     A registered nurse may call you a few days after your surgery to see how you are doing after your procedure.    MEDICATIONS: Resume taking daily medication.  Take prescribed pain medication with food.  If no medication is prescribed, you may take non-aspirin pain medication if needed.  PAIN MEDICATION CAN BE VERY CONSTIPATING.  Take a stool softener or laxative such as senokot, pericolace, or milk of magnesia if needed.     Last pain medication given at Tylenol and ibuprofen given at 7:30 pm.    If your physician has prescribed pain medication that includes Acetaminophen (Tylenol), do not take additional Acetaminophen (Tylenol) while taking the prescribed medication.    Depression / Suicide Risk    As you are discharged from this [facility] facility, it is important to learn how to keep safe from harming yourself.    Recognize the warning signs:  · Abrupt changes in personality, positive or negative- including increase in energy   · Giving away possessions  · Change in eating patterns- significant weight changes-  positive or negative  · Change in sleeping patterns- unable to sleep or sleeping all the time   · Unwillingness or inability to communicate  · Depression  · Unusual sadness, discouragement and loneliness  · Talk of wanting to die  · Neglect of personal appearance   · Rebelliousness- reckless behavior  · Withdrawal from people/activities they love  · Confusion- inability to concentrate     If you or a loved one observes any of these behaviors or has concerns about self-harm, here's what you can do:  · Talk about it- your feelings and reasons for harming yourself  · Remove any means that you might use to hurt yourself (examples: pills, rope, extension cords, firearm)  · Get professional help from the community (Mental Health, Substance Abuse,  psychological counseling)  · Do not be alone:Call your Safe Contact- someone whom you trust who will be there for you.  · Call your local CRISIS HOTLINE 968-2338 or 844-361-4395  · Call your local Children's Mobile Crisis Response Team [place] (331) 081-2638 or www.[employer]  · Call the toll free National Suicide Prevention Hotlines   · National Suicide Prevention Lifeline 554-271-XOYS (4652)  · National Hope Line Network 800-SUICIDE (275-3265)

## 2020-11-21 NOTE — ANESTHESIA PROCEDURE NOTES
Airway  Performed by: Shankar Ortiz M.D.  Authorized by: Shankar Ortiz M.D.     Location:  OR  Urgency:  Elective  Indications for Airway Management:  Anesthesia      Spontaneous Ventilation: absent    Sedation Level:  Deep  Preoxygenated: Yes    Final Airway Type:  Supraglottic airway  Final Supraglottic Airway:  Standard LMA    SGA Size:  4  Number of Attempts at Approach:  1

## 2020-11-21 NOTE — OR SURGEON
Immediate Post OP Note    PreOp Diagnosis: Subglottic stenosis    PostOp Diagnosis: Tracheal stenosis    Procedure(s):  LARYNGOSCOPY - DIRECT W/BALLOON DILATION - Wound Class: Clean Contaminated    Surgeon(s):  JACOBY Gonzalez M.D.    Anesthesiologist/Type of Anesthesia:  Anesthesiologist: Shankar Ortiz M.D./Sedation    Surgical Staff:  Circulator: Shyam Blanco R.N.  Scrub Person: Celeste Davis    Specimens removed if any:  * No specimens in log *    Estimated Blood Loss: <5cc    Findings: 3-4mm anterior tracheal stenosis. 12 o'clock cut made with gold laser then ballooned first to 13mm then 15mm    Complications: None        11/20/2020 6:57 PM Johnny Martines M.D.

## 2020-11-21 NOTE — OR NURSING
1906 pt arrived from OR. Report received. On 8L mask. Pt with copious oral secretions, able to cough and Suction.    1919 pt complaining of 10/10 headache. Order for tylenol and ibuprofen received from Dr. Ortiz.     1920 pt tolerating PO fluids.     1940 pt states headache pain now 3/10. Called pt's wife to provide updates and go over discharge instructions. All questions answered.     2000 pt meets phase 2 criteria. Pt dressed and up to recliner.     2030 pt d/c to home with family. Escorted out in wheelchair by staff. All belongings sent with pt.

## 2020-11-21 NOTE — ANESTHESIA TIME REPORT
Anesthesia Start and Stop Event Times     Date Time Event    11/20/2020 1816 Anesthesia Start     1822 Ready for Procedure     1913 Anesthesia Stop        Responsible Staff  11/20/20    Name Role Begin End    Shankar Ortiz M.D. Anesth 1816 1913        Preop Diagnosis (Free Text):  Pre-op Diagnosis     SUBGLOTTIC STENOSIS        Preop Diagnosis (Codes):  Diagnosis Information     Diagnosis Code(s): Tracheal stenosis [J39.8]        Post op Diagnosis  Subglottic stenosis      Premium Reason  A. 3PM - 7AM    Comments:

## 2020-11-21 NOTE — ANESTHESIA PREPROCEDURE EVALUATION
Relevant Problems   NEURO   (+) Seizure disorder (HCC)      CARDIAC   (+) Aortic insufficiency   (+) Heart murmur   subglotic stenosis      Physical Exam    Airway   Mallampati: II  TM distance: >3 FB  Neck ROM: full       Cardiovascular - normal exam  Rhythm: regular  Rate: normal  (-) murmur     Dental - normal exam           Pulmonary - normal exam  Breath sounds clear to auscultation     Abdominal    Neurological - normal exam                 Anesthesia Plan    ASA 2       Plan - general       Airway plan will be LMA        Induction: intravenous          Informed Consent:    Anesthetic plan and risks discussed with patient.

## 2020-12-18 ENCOUNTER — PRE-ADMISSION TESTING (OUTPATIENT)
Dept: ADMISSIONS | Facility: MEDICAL CENTER | Age: 30
End: 2020-12-18
Attending: OTOLARYNGOLOGY
Payer: COMMERCIAL

## 2020-12-18 DIAGNOSIS — Z01.812 PRE-OPERATIVE LABORATORY EXAMINATION: ICD-10-CM

## 2020-12-28 ENCOUNTER — APPOINTMENT (OUTPATIENT)
Dept: ADMISSIONS | Facility: MEDICAL CENTER | Age: 30
End: 2020-12-28
Attending: OTOLARYNGOLOGY
Payer: COMMERCIAL

## 2020-12-28 ENCOUNTER — HOSPITAL ENCOUNTER (OUTPATIENT)
Dept: LAB | Facility: MEDICAL CENTER | Age: 30
End: 2020-12-28
Attending: OTOLARYNGOLOGY
Payer: COMMERCIAL

## 2020-12-28 DIAGNOSIS — Z01.812 PRE-OPERATIVE LABORATORY EXAMINATION: ICD-10-CM

## 2020-12-28 LAB
COVID ORDER STATUS COVID19: NORMAL
SARS-COV-2 RNA RESP QL NAA+PROBE: NOTDETECTED
SPECIMEN SOURCE: NORMAL

## 2020-12-28 PROCEDURE — C9803 HOPD COVID-19 SPEC COLLECT: HCPCS

## 2020-12-28 PROCEDURE — U0003 INFECTIOUS AGENT DETECTION BY NUCLEIC ACID (DNA OR RNA); SEVERE ACUTE RESPIRATORY SYNDROME CORONAVIRUS 2 (SARS-COV-2) (CORONAVIRUS DISEASE [COVID-19]), AMPLIFIED PROBE TECHNIQUE, MAKING USE OF HIGH THROUGHPUT TECHNOLOGIES AS DESCRIBED BY CMS-2020-01-R: HCPCS

## 2020-12-31 ENCOUNTER — ANESTHESIA EVENT (OUTPATIENT)
Dept: SURGERY | Facility: MEDICAL CENTER | Age: 30
End: 2020-12-31
Payer: COMMERCIAL

## 2020-12-31 ENCOUNTER — ANESTHESIA (OUTPATIENT)
Dept: SURGERY | Facility: MEDICAL CENTER | Age: 30
End: 2020-12-31
Payer: COMMERCIAL

## 2020-12-31 ENCOUNTER — HOSPITAL ENCOUNTER (OUTPATIENT)
Facility: MEDICAL CENTER | Age: 30
End: 2020-12-31
Attending: OTOLARYNGOLOGY | Admitting: OTOLARYNGOLOGY
Payer: COMMERCIAL

## 2020-12-31 VITALS
OXYGEN SATURATION: 94 % | DIASTOLIC BLOOD PRESSURE: 71 MMHG | HEART RATE: 82 BPM | HEIGHT: 71 IN | RESPIRATION RATE: 16 BRPM | TEMPERATURE: 97.3 F | SYSTOLIC BLOOD PRESSURE: 107 MMHG | BODY MASS INDEX: 29.94 KG/M2 | WEIGHT: 213.85 LBS

## 2020-12-31 PROCEDURE — 700105 HCHG RX REV CODE 258: Performed by: OTOLARYNGOLOGY

## 2020-12-31 PROCEDURE — 700101 HCHG RX REV CODE 250: Performed by: OTOLARYNGOLOGY

## 2020-12-31 PROCEDURE — 500870 HCHG NEEDLE, WILLIAMS CYSTOSCOPIC: Performed by: OTOLARYNGOLOGY

## 2020-12-31 PROCEDURE — 700111 HCHG RX REV CODE 636 W/ 250 OVERRIDE (IP): Performed by: OTOLARYNGOLOGY

## 2020-12-31 PROCEDURE — 160002 HCHG RECOVERY MINUTES (STAT): Performed by: OTOLARYNGOLOGY

## 2020-12-31 PROCEDURE — 700111 HCHG RX REV CODE 636 W/ 250 OVERRIDE (IP): Performed by: ANESTHESIOLOGY

## 2020-12-31 PROCEDURE — 160036 HCHG PACU - EA ADDL 30 MINS PHASE I: Performed by: OTOLARYNGOLOGY

## 2020-12-31 PROCEDURE — 160025 RECOVERY II MINUTES (STATS): Performed by: OTOLARYNGOLOGY

## 2020-12-31 PROCEDURE — 160041 HCHG SURGERY MINUTES - EA ADDL 1 MIN LEVEL 4: Performed by: OTOLARYNGOLOGY

## 2020-12-31 PROCEDURE — 160029 HCHG SURGERY MINUTES - 1ST 30 MINS LEVEL 4: Performed by: OTOLARYNGOLOGY

## 2020-12-31 PROCEDURE — 700102 HCHG RX REV CODE 250 W/ 637 OVERRIDE(OP): Performed by: ANESTHESIOLOGY

## 2020-12-31 PROCEDURE — 500331 HCHG COTTONOID, SURG PATTIE: Performed by: OTOLARYNGOLOGY

## 2020-12-31 PROCEDURE — A9270 NON-COVERED ITEM OR SERVICE: HCPCS | Performed by: OTOLARYNGOLOGY

## 2020-12-31 PROCEDURE — 700101 HCHG RX REV CODE 250: Performed by: ANESTHESIOLOGY

## 2020-12-31 PROCEDURE — 160035 HCHG PACU - 1ST 60 MINS PHASE I: Performed by: OTOLARYNGOLOGY

## 2020-12-31 PROCEDURE — A9270 NON-COVERED ITEM OR SERVICE: HCPCS | Performed by: ANESTHESIOLOGY

## 2020-12-31 PROCEDURE — 160048 HCHG OR STATISTICAL LEVEL 1-5: Performed by: OTOLARYNGOLOGY

## 2020-12-31 PROCEDURE — 160046 HCHG PACU - 1ST 60 MINS PHASE II: Performed by: OTOLARYNGOLOGY

## 2020-12-31 PROCEDURE — 501838 HCHG SUTURE GENERAL: Performed by: OTOLARYNGOLOGY

## 2020-12-31 PROCEDURE — 160009 HCHG ANES TIME/MIN: Performed by: OTOLARYNGOLOGY

## 2020-12-31 PROCEDURE — 502573 HCHG PACK, ENT: Performed by: OTOLARYNGOLOGY

## 2020-12-31 PROCEDURE — C1726 CATH, BAL DIL, NON-VASCULAR: HCPCS | Performed by: OTOLARYNGOLOGY

## 2020-12-31 RX ORDER — MEPERIDINE HYDROCHLORIDE 25 MG/ML
12.5 INJECTION INTRAMUSCULAR; INTRAVENOUS; SUBCUTANEOUS
Status: DISCONTINUED | OUTPATIENT
Start: 2020-12-31 | End: 2020-12-31 | Stop reason: HOSPADM

## 2020-12-31 RX ORDER — KETAMINE HYDROCHLORIDE 50 MG/ML
INJECTION, SOLUTION INTRAMUSCULAR; INTRAVENOUS PRN
Status: DISCONTINUED | OUTPATIENT
Start: 2020-12-31 | End: 2020-12-31 | Stop reason: SURG

## 2020-12-31 RX ORDER — HALOPERIDOL 5 MG/ML
1 INJECTION INTRAMUSCULAR
Status: DISCONTINUED | OUTPATIENT
Start: 2020-12-31 | End: 2020-12-31 | Stop reason: HOSPADM

## 2020-12-31 RX ORDER — TRIAMCINOLONE ACETONIDE 40 MG/ML
INJECTION, SUSPENSION INTRA-ARTICULAR; INTRAMUSCULAR
Status: DISCONTINUED
Start: 2020-12-31 | End: 2020-12-31 | Stop reason: HOSPADM

## 2020-12-31 RX ORDER — SODIUM CHLORIDE, SODIUM LACTATE, POTASSIUM CHLORIDE, CALCIUM CHLORIDE 600; 310; 30; 20 MG/100ML; MG/100ML; MG/100ML; MG/100ML
INJECTION, SOLUTION INTRAVENOUS CONTINUOUS
Status: DISCONTINUED | OUTPATIENT
Start: 2020-12-31 | End: 2020-12-31 | Stop reason: HOSPADM

## 2020-12-31 RX ORDER — OXYCODONE HCL 5 MG/5 ML
10 SOLUTION, ORAL ORAL
Status: COMPLETED | OUTPATIENT
Start: 2020-12-31 | End: 2020-12-31

## 2020-12-31 RX ORDER — TRIAMCINOLONE ACETONIDE 40 MG/ML
INJECTION, SUSPENSION INTRA-ARTICULAR; INTRAMUSCULAR
Status: DISCONTINUED | OUTPATIENT
Start: 2020-12-31 | End: 2020-12-31 | Stop reason: HOSPADM

## 2020-12-31 RX ORDER — OXYCODONE HCL 5 MG/5 ML
5 SOLUTION, ORAL ORAL
Status: COMPLETED | OUTPATIENT
Start: 2020-12-31 | End: 2020-12-31

## 2020-12-31 RX ORDER — DIPHENHYDRAMINE HYDROCHLORIDE 50 MG/ML
12.5 INJECTION INTRAMUSCULAR; INTRAVENOUS
Status: DISCONTINUED | OUTPATIENT
Start: 2020-12-31 | End: 2020-12-31 | Stop reason: HOSPADM

## 2020-12-31 RX ORDER — ONDANSETRON 2 MG/ML
4 INJECTION INTRAMUSCULAR; INTRAVENOUS
Status: DISCONTINUED | OUTPATIENT
Start: 2020-12-31 | End: 2020-12-31 | Stop reason: HOSPADM

## 2020-12-31 RX ORDER — OXYMETAZOLINE HYDROCHLORIDE 0.05 G/100ML
SPRAY NASAL
Status: DISCONTINUED
Start: 2020-12-31 | End: 2020-12-31 | Stop reason: HOSPADM

## 2020-12-31 RX ADMIN — PROPOFOL 100 MG: 10 INJECTION, EMULSION INTRAVENOUS at 10:01

## 2020-12-31 RX ADMIN — OXYCODONE HYDROCHLORIDE 5 MG: 5 SOLUTION ORAL at 11:33

## 2020-12-31 RX ADMIN — SODIUM CHLORIDE, POTASSIUM CHLORIDE, SODIUM LACTATE AND CALCIUM CHLORIDE: 600; 310; 30; 20 INJECTION, SOLUTION INTRAVENOUS at 09:27

## 2020-12-31 RX ADMIN — KETAMINE HYDROCHLORIDE 30 MG: 50 INJECTION INTRAMUSCULAR; INTRAVENOUS at 10:17

## 2020-12-31 RX ADMIN — KETAMINE HYDROCHLORIDE 50 MG: 50 INJECTION INTRAMUSCULAR; INTRAVENOUS at 10:01

## 2020-12-31 RX ADMIN — FENTANYL CITRATE 100 MCG: 50 INJECTION, SOLUTION INTRAMUSCULAR; INTRAVENOUS at 09:57

## 2020-12-31 RX ADMIN — MIDAZOLAM 2 MG: 1 INJECTION INTRAMUSCULAR; INTRAVENOUS at 09:57

## 2020-12-31 RX ADMIN — POVIDONE IODINE 15 ML: 100 SOLUTION TOPICAL at 09:27

## 2020-12-31 RX ADMIN — FENTANYL CITRATE 25 MCG: 50 INJECTION, SOLUTION INTRAMUSCULAR; INTRAVENOUS at 11:18

## 2020-12-31 ASSESSMENT — FIBROSIS 4 INDEX: FIB4 SCORE: 0.78

## 2020-12-31 ASSESSMENT — PAIN DESCRIPTION - PAIN TYPE
TYPE: SURGICAL PAIN

## 2020-12-31 NOTE — ANESTHESIA TIME REPORT
Anesthesia Start and Stop Event Times     Date Time Event    12/31/2020 0917 Ready for Procedure     0957 Anesthesia Start     1105 Anesthesia Stop        Responsible Staff  12/31/20    Name Role Begin End    Joby Joseph M.D. Anesth 0957 1105        Preop Diagnosis (Free Text):  Pre-op Diagnosis     TRACHEAL STENOSIS        Preop Diagnosis (Codes):    Post op Diagnosis  Tracheal stenosis      Premium Reason  C. Post-1st,2nd,3rd,OB,RTC    Comments:

## 2020-12-31 NOTE — OR NURSING
1047 Pt arrived from OR with Dr. Joseph.  Pt VSS, PIV infusing without issue.  Oral airway in place, O2 in use.   1100 Pt waking up, oral airway removed.  Pt with a lot of oral secretions, suction in use.   1115 Pt complaints of a headache. Cool washcloth provided.  Pt medicated with IV Fentanyl.    1133 Pt medicated with PO oxycodone as ordered.    1200 Called and updated wife, Rea on POC. Discharge instructions reviewed with pt and wife. Answered all questions and concerns.    1210 Pt assisted up to change clothes, tolerated well.  Pt moved to phase 2.    1215  Pt meets d/c criteria, feels ready to go home.  PIV removed, pt tolerated well.    1220 Pt escorted out with wheelchair to d/c home.

## 2020-12-31 NOTE — ANESTHESIA POSTPROCEDURE EVALUATION
Patient: Umesh Chang    Procedure Summary     Date: 12/31/20 Room / Location: [facility] ROOM 24 / SURGERY SAME DAY [facility]    Anesthesia Start: 0957 Anesthesia Stop: 1055    Procedures:       LARYNGOSCOPY - DIRECT (N/A Throat)      BRONCHOSCOPY - WITH DILATION (N/A Throat) Diagnosis: (TRACHEAL STENOSIS)    Surgeons: Johnny Martines M.D. Responsible Provider: Joby Joseph M.D.    Anesthesia Type: general ASA Status: 2          Final Anesthesia Type: general  Last vitals  BP   Blood Pressure: 102/71    Temp   36.3 °C (97.3 °F)    Pulse   Pulse: 84   Resp   14    SpO2   96 %      Anesthesia Post Evaluation    Patient location during evaluation: PACU  Patient participation: complete - patient participated  Level of consciousness: awake and alert    Airway patency: patent  Anesthetic complications: no  Cardiovascular status: hemodynamically stable  Respiratory status: acceptable  Hydration status: euvolemic    PONV: none           Nurse Pain Score: 3 (NPRS)

## 2020-12-31 NOTE — ANESTHESIA PROCEDURE NOTES
Airway    Date/Time: 12/31/2020 10:19 AM  Performed by: Joby Joseph M.D.  Authorized by: Joby Joseph M.D.     Location:  OR  Urgency:  Elective  Indications for Airway Management:  Anesthesia      Spontaneous Ventilation: absent    Sedation Level:  Deep  Preoxygenated: Yes    Patient Position:  Sniffing  Final Airway Type:  Endotracheal airway  Final Endotracheal Airway:  ETT  Cuffed: Yes    Technique Used for Successful ETT Placement:  Direct laryngoscopy    Insertion Site:  Oral  Blade Type:  Isaacs  Laryngoscope Blade/Videolaryngoscope Blade Size:  4  ETT Size (mm):  3.0  Measured from:  Teeth  ETT to Teeth (cm):  20  Placement Verified by: auscultation and capnometry    Cormack-Lehane Classification:  Grade I - full view of glottis  Number of Attempts at Approach:  1

## 2020-12-31 NOTE — OP REPORT
DATE OF SERVICE: 12/31/20     SURGEON:  Johnny Martines MD.     ASSISTANT:  None     ANESTHESIOLOGIST:  Joby Joseph    Surgical Staff:  Circulator: Arias Cheema R.N.  Scrub Person: Tiffany Lozada; Sandar Harrison     PREOPERATIVE DIAGNOSIS:  Subglottic stenosis.     POSTOPERATIVE DIAGNOSIS:  Subglottic stenosis.     INDICATIONS FOR PROCEDURE:  31 y/o male with hx of intubation s/p laser division and dilation of thick band SGS 6 weeks ago. Pt began having stridor again about a week ago, but not as bad as before. Risks, benefits, and alternatives   to the procedure were discussed with the patient preoperatively and the patient gave their informed written consent.     OPERATIVE FINDINGS:  Scar banding of the subglottic 1-2 cm below the glottis. 40mg kenalog injected. Gold laser used on setting of 6 and 8W. Dilated to 15mm.     DESCRIPTION OF PROCEDURE:  The patient was identified in the preoperative   holding area and brought to the operating room.  A general anesthetic was   administered and the patient was turned 90 degrees towards the surgeon after confirming the patient could be ventillated.  A tooth guard was placed then the modified subglottiscope was used to gain visualization of the vocal cords.  The 5 mm   telescope was used to take photographs.  40 mg of Kenalog   was injected through a urologic needle into the scar band and then the gold   laser on a setting of 6 then 8W was used to make 12 o'clock incisions. A small amount of monico was removed with a small cup forceps.  The Cook balloon catheter was   then dilated to 15mm and held for 60 seconds.    Photodocumentation was obtained and the patient was awoken and brought to the   postanesthesia care unit in good condition.     ESTIMATED BLOOD LOSS:  <5 mL.     URINE OUTPUT:  Not recorded.     COMPLICATIONS:  None.     SPECIMENS REMOVED:  None.    Complications: None    12/31/2020 10:44 AM Johnny Martines M.D.

## 2020-12-31 NOTE — DISCHARGE INSTRUCTIONS
ACTIVITY: Rest and take it easy for the first 24 hours.  A responsible adult is recommended to remain with you during that time.  It is normal to feel sleepy.  We encourage you to not do anything that requires balance, judgment or coordination.    MILD FLU-LIKE SYMPTOMS ARE NORMAL. YOU MAY EXPERIENCE GENERALIZED MUSCLE ACHES, THROAT IRRITATION, HEADACHE AND/OR SOME NAUSEA.    FOR 24 HOURS DO NOT:  Drive, operate machinery or run household appliances.  Drink beer or alcoholic beverages.   Make important decisions or sign legal documents.    SPECIAL INSTRUCTIONS:  Laryngoscopy, Care After  This sheet gives you information about how to care for yourself after your procedure. Your health care provider may also give you more specific instructions. If you have problems or questions, contact your health care provider.  What can I expect after the procedure?  After the procedure, it is common to have:  · A sore throat.  · A hoarse voice.  · A temporary change in how your voice sounds.  Follow these instructions at home:  Medicines  · Take over-the-counter and prescription medicines only as told by your health care provider.  Driving    · Do not drive for 24 hours if you were given a sedative during your procedure.  General instructions    · Return to your normal activities as told by your health care provider. Ask your health care provider what activities are safe for you.  ? If your laryngoscopy was done using medicine to numb only your throat (local anesthetic), you may be able to return to your normal activities right away.  · If your health care provider took tissue from your throat for lab testing (biopsy), it is up to you to get your test results. Ask your health care provider, or the department that did the procedure, when your results will be ready.  · Do not use any products that contain nicotine or tobacco, such as cigarettes and e-cigarettes. If you need help quitting, ask your health care provider.  · Follow  instructions from your health care provider about eating or drinking restrictions.  · Keep all follow-up visits as told by your health care provider. This is important.  Contact a health care provider if:  · You have a fever.  · You develop a cough.  · You develop nausea and vomiting.  Get help right away if:  · You have severe pain.  · You have chest pain.  · You have new bleeding during coughing, spitting, or vomiting.  · You develop new problems with swallowing.  · You have difficulty breathing.  Summary  · After a laryngoscopy it is common to have a sore throat, a hoarse voice, or a temporary change in the sound of your voice.  · Take over the counter and prescription medicines as told by your health care provider.  · Get help right away if you have difficulty breathing after this procedure.  · Keep all follow-up visits as told by your health care provider. This is important.  This information is not intended to replace advice given to you by your health care provider. Make sure you discuss any questions you have with your health care provider.  Document Released: 01/13/2017 Document Revised: 04/17/2019 Document Reviewed: 04/17/2019  [employer] Patient Education © 2020 [employer]      DIET: To avoid nausea, slowly advance diet as tolerated, avoiding spicy or greasy foods for the first day.  Add more substantial food to your diet according to your physician's instructions.  Babies can be fed formula or breast milk as soon as they are hungry.  INCREASE FLUIDS AND FIBER TO AVOID CONSTIPATION.    SURGICAL DRESSING/BATHING: May shower tomorrow.     FOLLOW-UP APPOINTMENT:  A follow-up appointment should be arranged with your doctor in 3 weeks; call to schedule.    You should CALL YOUR PHYSICIAN if you develop:  Fever greater than 101 degrees F.  Pain not relieved by medication, or persistent nausea or vomiting.  Excessive bleeding (blood soaking through dressing) or unexpected drainage from the wound.  Extreme  redness or swelling around the incision site, drainage of pus or foul smelling drainage.  Inability to urinate or empty your bladder within 8 hours.  Problems with breathing or chest pain.    You should call 911 if you develop problems with breathing or chest pain.  If you are unable to contact your doctor or surgical center, you should go to the nearest emergency room or urgent care center.  Physician's telephone #: [phone]    If any questions arise, call your doctor.  If your doctor is not available, please feel free to call the Surgical Center at [phone]. The Contact Center is open Monday through Friday 7AM to 5PM and may speak to a nurse at [phone], or toll free at [phone].     A registered nurse may call you a few days after your surgery to see how you are doing after your procedure.    MEDICATIONS: Resume taking daily medication.  Take prescribed pain medication with food.  If no medication is prescribed, you may take non-aspirin pain medication if needed.  PAIN MEDICATION CAN BE VERY CONSTIPATING.  Take a stool softener or laxative such as senokot, pericolace, or milk of magnesia if needed.    Prescription given for none given.  Last pain medication given at ___11:30 am_________.    If your physician has prescribed pain medication that includes Acetaminophen (Tylenol), do not take additional Acetaminophen (Tylenol) while taking the prescribed medication.    Depression / Suicide Risk    As you are discharged from this [facility] facility, it is important to learn how to keep safe from harming yourself.    Recognize the warning signs:  · Abrupt changes in personality, positive or negative- including increase in energy   · Giving away possessions  · Change in eating patterns- significant weight changes-  positive or negative  · Change in sleeping patterns- unable to sleep or sleeping all the time   · Unwillingness or inability to communicate  · Depression  · Unusual sadness,  discouragement and loneliness  · Talk of wanting to die  · Neglect of personal appearance   · Rebelliousness- reckless behavior  · Withdrawal from people/activities they love  · Confusion- inability to concentrate     If you or a loved one observes any of these behaviors or has concerns about self-harm, here's what you can do:  · Talk about it- your feelings and reasons for harming yourself  · Remove any means that you might use to hurt yourself (examples: pills, rope, extension cords, firearm)  · Get professional help from the community (Mental Health, Substance Abuse, psychological counseling)  · Do not be alone:Call your Safe Contact- someone whom you trust who will be there for you.  · Call your local CRISIS HOTLINE 022-2336 or 220-704-3298  · Call your local Children's Mobile Crisis Response Team [place] (537) 172-2196 or www.AdelaVoice  · Call the toll free National Suicide Prevention Hotlines   · National Suicide Prevention Lifeline 321-925-BLAH (5585)  · National Hope Line Network 800-SUICIDE (460-0098)

## 2020-12-31 NOTE — ANESTHESIA POSTPROCEDURE EVALUATION
Patient: Umesh Chang    Procedure Summary     Date: 12/31/20 Room / Location: [facility] ROOM 24 / SURGERY SAME DAY [facility]    Anesthesia Start: 0957 Anesthesia Stop: 1055    Procedures:       LARYNGOSCOPY - DIRECT (N/A Throat)      BRONCHOSCOPY - WITH DILATION (N/A Throat) Diagnosis: (TRACHEAL STENOSIS)    Surgeons: Johnny Martines M.D. Responsible Provider: Joby Joseph M.D.    Anesthesia Type: general ASA Status: 2          Final Anesthesia Type: general  Last vitals  BP   Blood Pressure: 102/71    Temp   36.3 °C (97.3 °F)    Pulse   Pulse: 84   Resp   14    SpO2   96 %      Anesthesia Post Evaluation    Patient location during evaluation: PACU  Patient participation: complete - patient participated  Level of consciousness: awake and alert    Airway patency: patent  Anesthetic complications: no  Cardiovascular status: hemodynamically stable  Respiratory status: acceptable  Hydration status: euvolemic    PONV: none           Nurse Pain Score: 3 (NPRS)

## 2021-02-18 ENCOUNTER — TELEMEDICINE (OUTPATIENT)
Dept: ADMISSIONS | Facility: MEDICAL CENTER | Age: 31
End: 2021-02-18
Attending: OTOLARYNGOLOGY
Payer: COMMERCIAL

## 2021-02-18 RX ORDER — DIVALPROEX SODIUM 500 MG/1
500 TABLET, EXTENDED RELEASE ORAL
COMMUNITY
Start: 2020-12-21

## 2021-02-19 ENCOUNTER — PRE-ADMISSION TESTING (OUTPATIENT)
Dept: ADMISSIONS | Facility: MEDICAL CENTER | Age: 31
End: 2021-02-19
Attending: OTOLARYNGOLOGY
Payer: COMMERCIAL

## 2021-02-19 DIAGNOSIS — Z01.812 PRE-OPERATIVE LABORATORY EXAMINATION: ICD-10-CM

## 2021-02-19 PROCEDURE — C9803 HOPD COVID-19 SPEC COLLECT: HCPCS

## 2021-02-19 PROCEDURE — U0005 INFEC AGEN DETEC AMPLI PROBE: HCPCS

## 2021-02-19 PROCEDURE — U0003 INFECTIOUS AGENT DETECTION BY NUCLEIC ACID (DNA OR RNA); SEVERE ACUTE RESPIRATORY SYNDROME CORONAVIRUS 2 (SARS-COV-2) (CORONAVIRUS DISEASE [COVID-19]), AMPLIFIED PROBE TECHNIQUE, MAKING USE OF HIGH THROUGHPUT TECHNOLOGIES AS DESCRIBED BY CMS-2020-01-R: HCPCS

## 2021-02-22 ENCOUNTER — ANESTHESIA EVENT (OUTPATIENT)
Dept: SURGERY | Facility: MEDICAL CENTER | Age: 31
End: 2021-02-22
Payer: COMMERCIAL

## 2021-02-23 ENCOUNTER — ANESTHESIA (OUTPATIENT)
Dept: SURGERY | Facility: MEDICAL CENTER | Age: 31
End: 2021-02-23
Payer: COMMERCIAL

## 2021-02-23 ENCOUNTER — HOSPITAL ENCOUNTER (OUTPATIENT)
Facility: MEDICAL CENTER | Age: 31
End: 2021-02-23
Attending: OTOLARYNGOLOGY | Admitting: OTOLARYNGOLOGY
Payer: COMMERCIAL

## 2021-02-23 VITALS
RESPIRATION RATE: 16 BRPM | HEART RATE: 85 BPM | DIASTOLIC BLOOD PRESSURE: 73 MMHG | OXYGEN SATURATION: 90 % | WEIGHT: 221.34 LBS | SYSTOLIC BLOOD PRESSURE: 125 MMHG | BODY MASS INDEX: 30.99 KG/M2 | TEMPERATURE: 97.1 F | HEIGHT: 71 IN

## 2021-02-23 PROCEDURE — 502573 HCHG PACK, ENT: Performed by: OTOLARYNGOLOGY

## 2021-02-23 PROCEDURE — 160002 HCHG RECOVERY MINUTES (STAT): Performed by: OTOLARYNGOLOGY

## 2021-02-23 PROCEDURE — 160047 HCHG PACU  - EA ADDL 30 MINS PHASE II: Performed by: OTOLARYNGOLOGY

## 2021-02-23 PROCEDURE — C1726 CATH, BAL DIL, NON-VASCULAR: HCPCS | Performed by: OTOLARYNGOLOGY

## 2021-02-23 PROCEDURE — 700105 HCHG RX REV CODE 258: Performed by: OTOLARYNGOLOGY

## 2021-02-23 PROCEDURE — 160009 HCHG ANES TIME/MIN: Performed by: OTOLARYNGOLOGY

## 2021-02-23 PROCEDURE — 160039 HCHG SURGERY MINUTES - EA ADDL 1 MIN LEVEL 3: Performed by: OTOLARYNGOLOGY

## 2021-02-23 PROCEDURE — A9270 NON-COVERED ITEM OR SERVICE: HCPCS | Performed by: OTOLARYNGOLOGY

## 2021-02-23 PROCEDURE — 501838 HCHG SUTURE GENERAL: Performed by: OTOLARYNGOLOGY

## 2021-02-23 PROCEDURE — 160028 HCHG SURGERY MINUTES - 1ST 30 MINS LEVEL 3: Performed by: OTOLARYNGOLOGY

## 2021-02-23 PROCEDURE — 500331 HCHG COTTONOID, SURG PATTIE: Performed by: OTOLARYNGOLOGY

## 2021-02-23 PROCEDURE — 700111 HCHG RX REV CODE 636 W/ 250 OVERRIDE (IP): Performed by: ANESTHESIOLOGY

## 2021-02-23 PROCEDURE — 700102 HCHG RX REV CODE 250 W/ 637 OVERRIDE(OP): Performed by: OTOLARYNGOLOGY

## 2021-02-23 PROCEDURE — 160048 HCHG OR STATISTICAL LEVEL 1-5: Performed by: OTOLARYNGOLOGY

## 2021-02-23 PROCEDURE — 700111 HCHG RX REV CODE 636 W/ 250 OVERRIDE (IP): Performed by: OTOLARYNGOLOGY

## 2021-02-23 PROCEDURE — 160046 HCHG PACU - 1ST 60 MINS PHASE II: Performed by: OTOLARYNGOLOGY

## 2021-02-23 PROCEDURE — 160025 RECOVERY II MINUTES (STATS): Performed by: OTOLARYNGOLOGY

## 2021-02-23 PROCEDURE — 700101 HCHG RX REV CODE 250: Performed by: OTOLARYNGOLOGY

## 2021-02-23 PROCEDURE — 160035 HCHG PACU - 1ST 60 MINS PHASE I: Performed by: OTOLARYNGOLOGY

## 2021-02-23 RX ORDER — SODIUM CHLORIDE, SODIUM LACTATE, POTASSIUM CHLORIDE, CALCIUM CHLORIDE 600; 310; 30; 20 MG/100ML; MG/100ML; MG/100ML; MG/100ML
INJECTION, SOLUTION INTRAVENOUS CONTINUOUS
Status: DISCONTINUED | OUTPATIENT
Start: 2021-02-23 | End: 2021-02-23 | Stop reason: HOSPADM

## 2021-02-23 RX ORDER — MEPERIDINE HYDROCHLORIDE 25 MG/ML
12.5 INJECTION INTRAMUSCULAR; INTRAVENOUS; SUBCUTANEOUS
Status: DISCONTINUED | OUTPATIENT
Start: 2021-02-23 | End: 2021-02-23 | Stop reason: HOSPADM

## 2021-02-23 RX ORDER — TRIAMCINOLONE ACETONIDE 40 MG/ML
INJECTION, SUSPENSION INTRA-ARTICULAR; INTRAMUSCULAR
Status: DISCONTINUED
Start: 2021-02-23 | End: 2021-02-23 | Stop reason: HOSPADM

## 2021-02-23 RX ORDER — TRIAMCINOLONE ACETONIDE 40 MG/ML
INJECTION, SUSPENSION INTRA-ARTICULAR; INTRAMUSCULAR
Status: DISCONTINUED | OUTPATIENT
Start: 2021-02-23 | End: 2021-02-23 | Stop reason: HOSPADM

## 2021-02-23 RX ORDER — MIDAZOLAM HYDROCHLORIDE 1 MG/ML
INJECTION INTRAMUSCULAR; INTRAVENOUS PRN
Status: DISCONTINUED | OUTPATIENT
Start: 2021-02-23 | End: 2021-02-23 | Stop reason: SURG

## 2021-02-23 RX ORDER — HYDROMORPHONE HYDROCHLORIDE 1 MG/ML
0.1 INJECTION, SOLUTION INTRAMUSCULAR; INTRAVENOUS; SUBCUTANEOUS
Status: DISCONTINUED | OUTPATIENT
Start: 2021-02-23 | End: 2021-02-23 | Stop reason: HOSPADM

## 2021-02-23 RX ORDER — OXYCODONE HYDROCHLORIDE AND ACETAMINOPHEN 5; 325 MG/1; MG/1
1 TABLET ORAL
Status: DISCONTINUED | OUTPATIENT
Start: 2021-02-23 | End: 2021-02-23 | Stop reason: HOSPADM

## 2021-02-23 RX ORDER — HYDROMORPHONE HYDROCHLORIDE 1 MG/ML
0.2 INJECTION, SOLUTION INTRAMUSCULAR; INTRAVENOUS; SUBCUTANEOUS
Status: DISCONTINUED | OUTPATIENT
Start: 2021-02-23 | End: 2021-02-23 | Stop reason: HOSPADM

## 2021-02-23 RX ORDER — ONDANSETRON 2 MG/ML
4 INJECTION INTRAMUSCULAR; INTRAVENOUS
Status: DISCONTINUED | OUTPATIENT
Start: 2021-02-23 | End: 2021-02-23 | Stop reason: HOSPADM

## 2021-02-23 RX ORDER — HYDROMORPHONE HYDROCHLORIDE 1 MG/ML
0.4 INJECTION, SOLUTION INTRAMUSCULAR; INTRAVENOUS; SUBCUTANEOUS
Status: DISCONTINUED | OUTPATIENT
Start: 2021-02-23 | End: 2021-02-23 | Stop reason: HOSPADM

## 2021-02-23 RX ORDER — OXYCODONE HYDROCHLORIDE AND ACETAMINOPHEN 5; 325 MG/1; MG/1
2 TABLET ORAL
Status: DISCONTINUED | OUTPATIENT
Start: 2021-02-23 | End: 2021-02-23 | Stop reason: HOSPADM

## 2021-02-23 RX ORDER — OXYMETAZOLINE HYDROCHLORIDE 0.05 G/100ML
SPRAY NASAL
Status: DISCONTINUED
Start: 2021-02-23 | End: 2021-02-23 | Stop reason: HOSPADM

## 2021-02-23 RX ORDER — HALOPERIDOL 5 MG/ML
1 INJECTION INTRAMUSCULAR
Status: DISCONTINUED | OUTPATIENT
Start: 2021-02-23 | End: 2021-02-23 | Stop reason: HOSPADM

## 2021-02-23 RX ORDER — OXYCODONE HCL 5 MG/5 ML
10 SOLUTION, ORAL ORAL ONCE
Status: COMPLETED | OUTPATIENT
Start: 2021-02-23 | End: 2021-02-23

## 2021-02-23 RX ORDER — DEXAMETHASONE SODIUM PHOSPHATE 4 MG/ML
INJECTION, SOLUTION INTRA-ARTICULAR; INTRALESIONAL; INTRAMUSCULAR; INTRAVENOUS; SOFT TISSUE PRN
Status: DISCONTINUED | OUTPATIENT
Start: 2021-02-23 | End: 2021-02-23 | Stop reason: SURG

## 2021-02-23 RX ORDER — DIPHENHYDRAMINE HYDROCHLORIDE 50 MG/ML
12.5 INJECTION INTRAMUSCULAR; INTRAVENOUS
Status: DISCONTINUED | OUTPATIENT
Start: 2021-02-23 | End: 2021-02-23 | Stop reason: HOSPADM

## 2021-02-23 RX ADMIN — POVIDONE IODINE 15 ML: 100 SOLUTION TOPICAL at 06:30

## 2021-02-23 RX ADMIN — PROPOFOL 150 MCG/KG/MIN: 10 INJECTION, EMULSION INTRAVENOUS at 07:07

## 2021-02-23 RX ADMIN — FENTANYL CITRATE 25 MCG: 50 INJECTION, SOLUTION INTRAMUSCULAR; INTRAVENOUS at 08:59

## 2021-02-23 RX ADMIN — SODIUM CHLORIDE, POTASSIUM CHLORIDE, SODIUM LACTATE AND CALCIUM CHLORIDE: 600; 310; 30; 20 INJECTION, SOLUTION INTRAVENOUS at 08:00

## 2021-02-23 RX ADMIN — PROPOFOL 30 MG: 10 INJECTION, EMULSION INTRAVENOUS at 08:05

## 2021-02-23 RX ADMIN — FENTANYL CITRATE 25 MCG: 50 INJECTION, SOLUTION INTRAMUSCULAR; INTRAVENOUS at 08:54

## 2021-02-23 RX ADMIN — FENTANYL CITRATE 50 MCG: 50 INJECTION, SOLUTION INTRAMUSCULAR; INTRAVENOUS at 08:25

## 2021-02-23 RX ADMIN — OXYCODONE HYDROCHLORIDE 10 MG: 5 SOLUTION ORAL at 09:05

## 2021-02-23 RX ADMIN — DEXAMETHASONE SODIUM PHOSPHATE 8 MG: 4 INJECTION, SOLUTION INTRA-ARTICULAR; INTRALESIONAL; INTRAMUSCULAR; INTRAVENOUS; SOFT TISSUE at 07:24

## 2021-02-23 RX ADMIN — SODIUM CHLORIDE, POTASSIUM CHLORIDE, SODIUM LACTATE AND CALCIUM CHLORIDE: 600; 310; 30; 20 INJECTION, SOLUTION INTRAVENOUS at 06:30

## 2021-02-23 RX ADMIN — MIDAZOLAM HYDROCHLORIDE 2 MG: 1 INJECTION, SOLUTION INTRAMUSCULAR; INTRAVENOUS at 07:03

## 2021-02-23 ASSESSMENT — PAIN DESCRIPTION - PAIN TYPE
TYPE: SURGICAL PAIN

## 2021-02-23 ASSESSMENT — FIBROSIS 4 INDEX: FIB4 SCORE: 0.78

## 2021-02-23 ASSESSMENT — PAIN SCALES - GENERAL: PAIN_LEVEL: 3

## 2021-02-23 NOTE — ANESTHESIA TIME REPORT
Anesthesia Start and Stop Event Times     Date Time Event    2/23/2021 0647 Ready for Procedure     0700 Anesthesia Start     0829 Anesthesia Stop        Responsible Staff  02/23/21    Name Role Begin End    Vidya Kraft M.D. Anesth 0700 0829        Preop Diagnosis (Free Text):  Pre-op Diagnosis     TRACHEAL STENOSIS        Preop Diagnosis (Codes):    Post op Diagnosis  Tracheal stenosis      Premium Reason  Non-Premium    Comments:

## 2021-02-23 NOTE — DISCHARGE INSTRUCTIONS
ACTIVITY: Rest and take it easy for the first 24 hours.  A responsible adult is recommended to remain with you during that time.  It is normal to feel sleepy.  We encourage you to not do anything that requires balance, judgment or coordination.    MILD FLU-LIKE SYMPTOMS ARE NORMAL. YOU MAY EXPERIENCE GENERALIZED MUSCLE ACHES, THROAT IRRITATION, HEADACHE AND/OR SOME NAUSEA.    FOR 24 HOURS DO NOT:  Drive, operate machinery or run household appliances.  Drink beer or alcoholic beverages.   Make important decisions or sign legal documents.    SPECIAL INSTRUCTIONS: Advance your diet as tolerated, ice chips and cold fluids may help    DIET: To avoid nausea, slowly advance diet as tolerated, avoiding spicy or greasy foods for the first day.  Add more substantial food to your diet according to your physician's instructions.  Babies can be fed formula or breast milk as soon as they are hungry.  INCREASE FLUIDS AND FIBER TO AVOID CONSTIPATION.    SURGICAL DRESSING/BATHING: Can shower starting tomorrow     FOLLOW-UP APPOINTMENT:  A follow-up appointment should be arranged with your doctor in 1 week; call to schedule.    You should CALL YOUR PHYSICIAN if you develop:  Fever greater than 101 degrees F.  Pain not relieved by medication, or persistent nausea or vomiting.  Excessive bleeding (blood soaking through dressing) or unexpected drainage from the wound.  Extreme redness or swelling around the incision site, drainage of pus or foul smelling drainage.  Inability to urinate or empty your bladder within 8 hours.  Problems with breathing or chest pain.    You should call 911 if you develop problems with breathing or chest pain.  If you are unable to contact your doctor or surgical center, you should go to the nearest emergency room or urgent care center.  Physician's telephone #: [phone]    If any questions arise, call your doctor.  If your doctor is not available, please feel free to call the Surgical Center at  [phone]. The Contact Center is open Monday through Friday 7AM to 5PM and may speak to a nurse at [phone], or toll free at [phone].     A registered nurse may call you a few days after your surgery to see how you are doing after your procedure.    MEDICATIONS: Resume taking daily medication.  Take prescribed pain medication with food.  If no medication is prescribed, you may take non-aspirin pain medication if needed.  PAIN MEDICATION CAN BE VERY CONSTIPATING.  Take a stool softener or laxative such as senokot, pericolace, or milk of magnesia if needed.    Last pain medication given at 9AM    If your physician has prescribed pain medication that includes Acetaminophen (Tylenol), do not take additional Acetaminophen (Tylenol) while taking the prescribed medication.    Depression / Suicide Risk    As you are discharged from this [facility] hospital facility, it is important to learn how to keep safe from harming yourself.    Recognize the warning signs:  · Abrupt changes in personality, positive or negative- including increase in energy   · Giving away possessions  · Change in eating patterns- significant weight changes-  positive or negative  · Change in sleeping patterns- unable to sleep or sleeping all the time   · Unwillingness or inability to communicate  · Depression  · Unusual sadness, discouragement and loneliness  · Talk of wanting to die  · Neglect of personal appearance   · Rebelliousness- reckless behavior  · Withdrawal from people/activities they love  · Confusion- inability to concentrate     If you or a loved one observes any of these behaviors or has concerns about self-harm, here's what you can do:  · Talk about it- your feelings and reasons for harming yourself  · Remove any means that you might use to hurt yourself (examples: pills, rope, extension cords, firearm)  · Get professional help from the community (Mental Health, Substance Abuse, psychological counseling)  · Do not be  alone:Call your Safe Contact- someone whom you trust who will be there for you.  · Call your local CRISIS HOTLINE 112-0013 or 437-386-9352  · Call your local Children's Mobile Crisis Response Team [place] (779) 014-9416 or www.SlideJar  · Call the toll free National Suicide Prevention Hotlines   · National Suicide Prevention Lifeline 331-309-YGXD (4480)  · National xAd Line Network 800-SUICIDE (611-5564)

## 2021-02-23 NOTE — OR NURSING
COVID-19 Pre-surgery screening:    Pt did not answer generic voicemail was left with call back number.

## 2021-02-23 NOTE — ANESTHESIA POSTPROCEDURE EVALUATION
Patient: Umesh Chang    Procedure Summary     Date: 02/23/21 Room / Location: [facility] ROOM 22 / SURGERY SAME DAY [facility]    Anesthesia Start: 0700 Anesthesia Stop: 0829    Procedure: LARYNGOSCOPY - DIRECT W/TRACHEAL DILATION (Throat) Diagnosis: (TRACHEAL STENOSIS)    Surgeons: Johnny Martines M.D. Responsible Provider: Vidya Kraft M.D.    Anesthesia Type: general ASA Status: 3          Final Anesthesia Type: general  Last vitals  BP   Blood Pressure: 112/84    Temp   36.2 °C (97.1 °F)    Pulse   79   Resp   16    SpO2   100 %      Anesthesia Post Evaluation    Patient location during evaluation: PACU  Patient participation: complete - patient participated  Level of consciousness: awake and alert  Pain score: 3    Airway patency: patent  Anesthetic complications: no  Cardiovascular status: hemodynamically stable  Respiratory status: acceptable  Hydration status: euvolemic    PONV: none          No complications documented.     Nurse Pain Score: 3 (NPRS)

## 2021-02-23 NOTE — ANESTHESIA PREPROCEDURE EVALUATION
Relevant Problems   NEURO   (+) Seizure disorder (HCC)      CARDIAC   (+) Aortic insufficiency      Other   (+) Bicuspid aortic valve   (+) Tracheal stenosis       Physical Exam    Airway   Mallampati: II  TM distance: >3 FB  Neck ROM: full       Cardiovascular - normal exam  Rhythm: regular  Rate: normal  (-) murmur     Dental - normal exam           Pulmonary - normal exam  Breath sounds clear to auscultation     Abdominal    Neurological - normal exam                 Anesthesia Plan    ASA 3       Plan - general       Airway plan will be ETT          Induction: intravenous    Postoperative Plan: Postoperative administration of opioids is intended.    Pertinent diagnostic labs and testing reviewed    Informed Consent:    Anesthetic plan and risks discussed with patient.    Use of blood products discussed with: patient whom consented to blood products.

## 2021-02-23 NOTE — OP REPORT
DATE OF SERVICE: 2/23/21     SURGEON:  Johnny Martines MD.     ASSISTANT:  None     ANESTHESIOLOGIST:  Vidya Kraft M.D./KHURRAM     PREOPERATIVE DIAGNOSIS:  Subglottic stenosis.     POSTOPERATIVE DIAGNOSIS:  Subglottic stenosis.     INDICATIONS FOR PROCEDURE:  The patient was intubated last fall s/p a presumed seizure. He presented with increased work of breathing and was first dilated 11/20/20, then again 12/31/20. He came back with increased work of breathing.  Risks, benefits, and alternatives   to the procedure were discussed with the patient preoperatively and the patient gave their informed written consent.     OPERATIVE FINDINGS:  Posterior opening <4mm. Gold laser on 8W used then dilated to 15 mm with the [employer] balloon   catheter.     DESCRIPTION OF PROCEDURE:  The patient was identified in the preoperative   holding area and brought to the operating room.  A general anesthetic was   administered and the patient was turned 90 degrees towards the surgeon after confirming the patient could be ventillated.  A tooth guard was placed then the modified subglottiscope was used to gain visualization of the vocal cords.  The 5 mm   telescope was used to take photographs.  40 mg of Kenalog   was injected through a urologic needle into the scar band and then the gold   laser on a setting of 8 was used to make a 12 o'clock incision.  The balloon catheter was   then dilated to 15mm twice, each for one minute. His oxygen luis angel was 78%.    Photodocumentation was obtained and the patient was awoken and brought to the   postanesthesia care unit in good condition.     ESTIMATED BLOOD LOSS:  <5 mL.     URINE OUTPUT:  Not recorded.     COMPLICATIONS:  None.     SPECIMENS REMOVED:  None.

## 2021-02-23 NOTE — OR NURSING
0826 received patient from OR. Report from Anesthesiologist and OR RN. Patient on 8L at 95 % O2. VSS. Monitor connected. S/P tracheal dilation with balloon and laser. Suction at bedside, bright red sputum collected.     0840 Humidified face tent applied, patient states slight sore throat, productive cough present     0854 Patient medicated for pain, no nausea present per patient    0859 Subsequent dose of Fentanyl given for pain along with PO oxycodone    0911 Patient tolerating ice chips and states pain is diminished, phase I complete     0940 Patient able to void and get dressed without assistance. Discharge instructions discussed with Rea patient's S.O. All questions answered     1015 Patient escorted out to responsible adult via Wheelchair by RN. Belongings with patient, ambulated with steady gait. Discharge paperwork and instructions reviewed, signed, and sent with patient.

## 2021-09-24 ENCOUNTER — HOSPITAL ENCOUNTER (EMERGENCY)
Facility: MEDICAL CENTER | Age: 31
End: 2021-09-24
Attending: EMERGENCY MEDICINE | Admitting: EMERGENCY MEDICINE
Payer: COMMERCIAL

## 2021-09-24 VITALS
HEIGHT: 71 IN | TEMPERATURE: 97.2 F | WEIGHT: 230.16 LBS | BODY MASS INDEX: 32.22 KG/M2 | SYSTOLIC BLOOD PRESSURE: 128 MMHG | HEART RATE: 72 BPM | RESPIRATION RATE: 16 BRPM | OXYGEN SATURATION: 94 % | DIASTOLIC BLOOD PRESSURE: 80 MMHG

## 2021-09-24 DIAGNOSIS — R05.9 COUGH: ICD-10-CM

## 2021-09-24 DIAGNOSIS — U07.1 COVID-19 VIRUS INFECTION: ICD-10-CM

## 2021-09-24 PROCEDURE — 99282 EMERGENCY DEPT VISIT SF MDM: CPT

## 2021-09-24 RX ORDER — BENZONATATE 100 MG/1
100 CAPSULE ORAL 3 TIMES DAILY PRN
Qty: 30 CAPSULE | Refills: 0 | Status: SHIPPED | OUTPATIENT
Start: 2021-09-24 | End: 2021-11-24

## 2021-09-24 ASSESSMENT — LIFESTYLE VARIABLES: DO YOU DRINK ALCOHOL: NO

## 2021-09-24 ASSESSMENT — FIBROSIS 4 INDEX: FIB4 SCORE: 0.8

## 2021-09-24 NOTE — ED TRIAGE NOTES
.  Chief Complaint   Patient presents with   • Shortness of Breath     covid x 10 days   • Cough   • Blood in Sputum     scant bloood in mucus x 2 days     Ambulated to triage with above c/c. Pt reports continued cough and new onset bloody sputum. No respiratory distress.   Mask applied to patient prior to triage. This RN in ppe prior to encounter.

## 2021-09-24 NOTE — ED PROVIDER NOTES
"ED Provider Note    Scribed for Aquilino Haddad M.D. by Debora Ortiz. 9/24/2021  2:34 PM    Primary care provider: Mohsen Tamasaby, M.D.  Means of arrival: Walk In  History obtained from: Patient  History limited by: None    CHIEF COMPLAINT  Chief Complaint   Patient presents with   • Shortness of Breath     covid x 10 days   • Cough   • Blood in Sputum     scant bloood in mucus x 2 days       HPI  Umesh Chang is a 31 y.o. male who presents to the Emergency Department for bloody sputum onset 2 days ago. The patient states he tested positive for COVID-19 ten days ago and is slowly \"getting over\" his symptoms.\" He reports he has been coughing up mucous with \"steaks of blood\" in it for the past couple of days, and was prompted to come into the [facility] ED this afternoon for further evaluation. A diffuser was used for alleviation, and no exacerbating factors were noted. Patient has associated cough, but denies shortness of breath, abdominal pain, leg pain, or leg swelling. He drinks alcohol, but does not smoke cigarettes or use drugs. The patient has no known allergies and takes Albuterol. He denies any history of asthma.     REVIEW OF SYSTEMS  Pertinent negatives include no shortness of breath, abdominal pain, leg pain, or leg swelling. As above, all other systems reviewed and are negative.   See HPI for further details.     PAST MEDICAL HISTORY   has a past medical history of Benign heart murmur, Breath shortness (12/2020), Pneumonia (09/2020), Seizure disorder (HCC), and Snoring.    SURGICAL HISTORY   has a past surgical history that includes laryngoscopy (N/A, 11/20/2020); tonsillectomy; bronchoscopy,diagnostic (N/A, 12/31/2020); laryngoscopy (N/A, 12/31/2020); other; and laryngoscopy (2/23/2021).    SOCIAL HISTORY  Social History     Tobacco Use   • Smoking status: Never Smoker   • Smokeless tobacco: Never Used   Vaping Use   • Vaping Use: Never used   Substance Use Topics   • Alcohol use: Yes     Comment: one beer " "every 3 weeks or so   • Drug use: No      Social History     Substance and Sexual Activity   Drug Use No       FAMILY HISTORY  Family History   Problem Relation Age of Onset   • Hypertension Mother        CURRENT MEDICATIONS  Home Medications     Reviewed by Lory Marinelli R.N. (Registered Nurse) on 09/24/21 at 1348  Med List Status: Complete   Medication Last Dose Status   albuterol (PROVENTIL) 2.5 mg/0.5 mL Nebu Soln  Active   divalproex ER (DEPAKOTE ER) 500 MG TABLET SR 24 HR 9/24/2021 Active                ALLERGIES  No Known Allergies    PHYSICAL EXAM  VITAL SIGNS: /87   Pulse 93   Temp 36.6 °C (97.9 °F) (Temporal)   Resp 16   Ht 1.803 m (5' 11\")   Wt 104 kg (230 lb 2.6 oz)   SpO2 94%   BMI 32.10 kg/m²     Constitutional: Well developed, Well nourished, No acute distress, Non-toxic appearance.   HENT: Normocephalic, Atraumatic, Bilateral external ears normal, Oropharynx is clear mucous membranes are moist. No oral exudates or nasal discharge.   Eyes: Pupils are equal round and reactive, EOMI, Conjunctiva normal, No discharge.   Neck: Normal range of motion, No tenderness, Supple, No stridor. No meningismus.  Lymphatic: No lymphadenopathy noted.   Cardiovascular: Regular rate and rhythm without murmur rub or gallop.  Thorax & Lungs: Clear breath sounds bilaterally without wheezes, rhonchi or rales. There is no chest wall tenderness.   Abdomen: Soft non-tender non-distended. There is no rebound or guarding. No organomegaly is appreciated. Bowel sounds are normal.  Skin: Normal without rash.   Back: No CVA or spinal tenderness.   Extremities: Intact distal pulses, No edema, No tenderness, No cyanosis, No clubbing. Capillary refill is less than 2 seconds.  Musculoskeletal: Good range of motion in all major joints. No tenderness to palpation or major deformities noted.   Neurologic: Alert & oriented x 3, Normal motor function, Normal sensory function, No focal deficits noted. Reflexes are " normal.  Psychiatric: Affect normal, Judgment normal, Mood normal. There is no suicidal ideation or patient reported hallucinations.     COURSE & MEDICAL DECISION MAKING  Nursing notes, VS, PMSFHx reviewed in chart.    2:30 PM Patient seen and examined at bedside. The plan of care was discussed with the patient. He was told he will receive Tessalon to treat his symptoms and was encouraged to keep his mucous membranes moist.  Patient is likely and I doubt pulmonary embolism and he rules out by PERC criteria    Patient verbalizes understanding and agreement to this plan of care. Patient had the opportunity to ask any questions. The plan for discharge was discussed with the patient and he was told to return for any new or worsening symptoms and to follow up with his PCP. Patient is understanding and agreeable to the plan for discharge.     The patient will return for new or worsening symptoms and is stable at the time of discharge.    DISPOSITION:  Patient will be discharged home in stable condition.    FINAL IMPRESSION  1. Cough    2. COVID-19 virus infection          Deobra FLOYD (Renan), am scribing for, and in the presence of, Aquilino Haddad M.D..    Electronically signed by: Debora Ortiz (Scribcherelle), 9/24/2021    Aquilino FLOYD M.D. personally performed the services described in this documentation, as scribed by Debora Ortiz in my presence, and it is both accurate and complete. E    The note accurately reflects work and decisions made by me.  Aquilino Haddad M.D.  9/24/2021  2:42 PM

## 2021-11-24 ENCOUNTER — PRE-ADMISSION TESTING (OUTPATIENT)
Dept: ADMISSIONS | Facility: MEDICAL CENTER | Age: 31
End: 2021-11-24
Attending: OTOLARYNGOLOGY
Payer: COMMERCIAL

## 2021-12-10 ENCOUNTER — APPOINTMENT (OUTPATIENT)
Dept: ADMISSIONS | Facility: MEDICAL CENTER | Age: 31
End: 2021-12-10
Attending: OTOLARYNGOLOGY
Payer: COMMERCIAL

## 2021-12-14 ENCOUNTER — ANESTHESIA (OUTPATIENT)
Dept: SURGERY | Facility: MEDICAL CENTER | Age: 31
End: 2021-12-14
Payer: COMMERCIAL

## 2021-12-14 ENCOUNTER — HOSPITAL ENCOUNTER (OUTPATIENT)
Facility: MEDICAL CENTER | Age: 31
End: 2021-12-14
Attending: OTOLARYNGOLOGY | Admitting: OTOLARYNGOLOGY
Payer: COMMERCIAL

## 2021-12-14 ENCOUNTER — ANESTHESIA EVENT (OUTPATIENT)
Dept: SURGERY | Facility: MEDICAL CENTER | Age: 31
End: 2021-12-14
Payer: COMMERCIAL

## 2021-12-14 VITALS
RESPIRATION RATE: 16 BRPM | WEIGHT: 233.69 LBS | HEART RATE: 82 BPM | TEMPERATURE: 98.3 F | OXYGEN SATURATION: 97 % | HEIGHT: 71 IN | BODY MASS INDEX: 32.72 KG/M2 | SYSTOLIC BLOOD PRESSURE: 113 MMHG | DIASTOLIC BLOOD PRESSURE: 75 MMHG

## 2021-12-14 PROCEDURE — 160048 HCHG OR STATISTICAL LEVEL 1-5: Performed by: OTOLARYNGOLOGY

## 2021-12-14 PROCEDURE — 160025 RECOVERY II MINUTES (STATS): Performed by: OTOLARYNGOLOGY

## 2021-12-14 PROCEDURE — C1726 CATH, BAL DIL, NON-VASCULAR: HCPCS | Performed by: OTOLARYNGOLOGY

## 2021-12-14 PROCEDURE — 160028 HCHG SURGERY MINUTES - 1ST 30 MINS LEVEL 3: Performed by: OTOLARYNGOLOGY

## 2021-12-14 PROCEDURE — 160002 HCHG RECOVERY MINUTES (STAT): Performed by: OTOLARYNGOLOGY

## 2021-12-14 PROCEDURE — 500331 HCHG COTTONOID, SURG PATTIE: Performed by: OTOLARYNGOLOGY

## 2021-12-14 PROCEDURE — 700102 HCHG RX REV CODE 250 W/ 637 OVERRIDE(OP): Performed by: OTOLARYNGOLOGY

## 2021-12-14 PROCEDURE — 700111 HCHG RX REV CODE 636 W/ 250 OVERRIDE (IP): Performed by: ANESTHESIOLOGY

## 2021-12-14 PROCEDURE — 502573 HCHG PACK, ENT: Performed by: OTOLARYNGOLOGY

## 2021-12-14 PROCEDURE — 700111 HCHG RX REV CODE 636 W/ 250 OVERRIDE (IP): Performed by: OTOLARYNGOLOGY

## 2021-12-14 PROCEDURE — A9270 NON-COVERED ITEM OR SERVICE: HCPCS | Performed by: OTOLARYNGOLOGY

## 2021-12-14 PROCEDURE — 160046 HCHG PACU - 1ST 60 MINS PHASE II: Performed by: OTOLARYNGOLOGY

## 2021-12-14 PROCEDURE — 160009 HCHG ANES TIME/MIN: Performed by: OTOLARYNGOLOGY

## 2021-12-14 PROCEDURE — 160035 HCHG PACU - 1ST 60 MINS PHASE I: Performed by: OTOLARYNGOLOGY

## 2021-12-14 PROCEDURE — 700105 HCHG RX REV CODE 258: Performed by: ANESTHESIOLOGY

## 2021-12-14 PROCEDURE — 160039 HCHG SURGERY MINUTES - EA ADDL 1 MIN LEVEL 3: Performed by: OTOLARYNGOLOGY

## 2021-12-14 RX ORDER — ONDANSETRON 2 MG/ML
4 INJECTION INTRAMUSCULAR; INTRAVENOUS
Status: COMPLETED | OUTPATIENT
Start: 2021-12-14 | End: 2021-12-14

## 2021-12-14 RX ORDER — MEPERIDINE HYDROCHLORIDE 25 MG/ML
12.5 INJECTION INTRAMUSCULAR; INTRAVENOUS; SUBCUTANEOUS
Status: DISCONTINUED | OUTPATIENT
Start: 2021-12-14 | End: 2021-12-14 | Stop reason: HOSPADM

## 2021-12-14 RX ORDER — SODIUM CHLORIDE, SODIUM LACTATE, POTASSIUM CHLORIDE, CALCIUM CHLORIDE 600; 310; 30; 20 MG/100ML; MG/100ML; MG/100ML; MG/100ML
INJECTION, SOLUTION INTRAVENOUS CONTINUOUS
Status: DISCONTINUED | OUTPATIENT
Start: 2021-12-14 | End: 2021-12-14 | Stop reason: HOSPADM

## 2021-12-14 RX ORDER — HYDROMORPHONE HYDROCHLORIDE 1 MG/ML
0.4 INJECTION, SOLUTION INTRAMUSCULAR; INTRAVENOUS; SUBCUTANEOUS
Status: DISCONTINUED | OUTPATIENT
Start: 2021-12-14 | End: 2021-12-14 | Stop reason: HOSPADM

## 2021-12-14 RX ORDER — TRIAMCINOLONE ACETONIDE 40 MG/ML
INJECTION, SUSPENSION INTRA-ARTICULAR; INTRAMUSCULAR
Status: DISCONTINUED
Start: 2021-12-14 | End: 2021-12-14 | Stop reason: HOSPADM

## 2021-12-14 RX ORDER — CEFAZOLIN SODIUM 1 G/3ML
INJECTION, POWDER, FOR SOLUTION INTRAMUSCULAR; INTRAVENOUS PRN
Status: DISCONTINUED | OUTPATIENT
Start: 2021-12-14 | End: 2021-12-14 | Stop reason: SURG

## 2021-12-14 RX ORDER — ONDANSETRON 2 MG/ML
INJECTION INTRAMUSCULAR; INTRAVENOUS PRN
Status: DISCONTINUED | OUTPATIENT
Start: 2021-12-14 | End: 2021-12-14 | Stop reason: SURG

## 2021-12-14 RX ORDER — HYDROMORPHONE HYDROCHLORIDE 1 MG/ML
0.2 INJECTION, SOLUTION INTRAMUSCULAR; INTRAVENOUS; SUBCUTANEOUS
Status: DISCONTINUED | OUTPATIENT
Start: 2021-12-14 | End: 2021-12-14 | Stop reason: HOSPADM

## 2021-12-14 RX ORDER — OXYMETAZOLINE HYDROCHLORIDE 0.05 G/100ML
SPRAY NASAL
Status: DISCONTINUED
Start: 2021-12-14 | End: 2021-12-14 | Stop reason: HOSPADM

## 2021-12-14 RX ORDER — HALOPERIDOL 5 MG/ML
1 INJECTION INTRAMUSCULAR
Status: DISCONTINUED | OUTPATIENT
Start: 2021-12-14 | End: 2021-12-14 | Stop reason: HOSPADM

## 2021-12-14 RX ORDER — OXYMETAZOLINE HYDROCHLORIDE 0.05 G/100ML
2 SPRAY NASAL
Status: COMPLETED | OUTPATIENT
Start: 2021-12-14 | End: 2021-12-14

## 2021-12-14 RX ORDER — HYDROMORPHONE HYDROCHLORIDE 1 MG/ML
0.1 INJECTION, SOLUTION INTRAMUSCULAR; INTRAVENOUS; SUBCUTANEOUS
Status: DISCONTINUED | OUTPATIENT
Start: 2021-12-14 | End: 2021-12-14 | Stop reason: HOSPADM

## 2021-12-14 RX ORDER — DIPHENHYDRAMINE HYDROCHLORIDE 50 MG/ML
12.5 INJECTION INTRAMUSCULAR; INTRAVENOUS
Status: DISCONTINUED | OUTPATIENT
Start: 2021-12-14 | End: 2021-12-14 | Stop reason: HOSPADM

## 2021-12-14 RX ORDER — OXYCODONE HCL 5 MG/5 ML
10 SOLUTION, ORAL ORAL
Status: DISCONTINUED | OUTPATIENT
Start: 2021-12-14 | End: 2021-12-14 | Stop reason: HOSPADM

## 2021-12-14 RX ORDER — DEXAMETHASONE SODIUM PHOSPHATE 4 MG/ML
INJECTION, SOLUTION INTRA-ARTICULAR; INTRALESIONAL; INTRAMUSCULAR; INTRAVENOUS; SOFT TISSUE PRN
Status: DISCONTINUED | OUTPATIENT
Start: 2021-12-14 | End: 2021-12-14 | Stop reason: SURG

## 2021-12-14 RX ORDER — TRIAMCINOLONE ACETONIDE 40 MG/ML
INJECTION, SUSPENSION INTRA-ARTICULAR; INTRAMUSCULAR
Status: DISCONTINUED | OUTPATIENT
Start: 2021-12-14 | End: 2021-12-14 | Stop reason: HOSPADM

## 2021-12-14 RX ORDER — SODIUM CHLORIDE, SODIUM LACTATE, POTASSIUM CHLORIDE, CALCIUM CHLORIDE 600; 310; 30; 20 MG/100ML; MG/100ML; MG/100ML; MG/100ML
INJECTION, SOLUTION INTRAVENOUS
Status: DISCONTINUED | OUTPATIENT
Start: 2021-12-14 | End: 2021-12-14 | Stop reason: SURG

## 2021-12-14 RX ORDER — OXYCODONE HCL 5 MG/5 ML
5 SOLUTION, ORAL ORAL
Status: DISCONTINUED | OUTPATIENT
Start: 2021-12-14 | End: 2021-12-14 | Stop reason: HOSPADM

## 2021-12-14 RX ADMIN — ONDANSETRON 4 MG: 2 INJECTION INTRAMUSCULAR; INTRAVENOUS at 12:39

## 2021-12-14 RX ADMIN — PROPOFOL 50 MG: 10 INJECTION, EMULSION INTRAVENOUS at 11:59

## 2021-12-14 RX ADMIN — CEFAZOLIN 2 G: 330 INJECTION, POWDER, FOR SOLUTION INTRAMUSCULAR; INTRAVENOUS at 11:41

## 2021-12-14 RX ADMIN — PROPOFOL 50 MG: 10 INJECTION, EMULSION INTRAVENOUS at 11:42

## 2021-12-14 RX ADMIN — PROPOFOL 250 MCG/KG/MIN: 10 INJECTION, EMULSION INTRAVENOUS at 11:43

## 2021-12-14 RX ADMIN — FENTANYL CITRATE 50 MCG: 50 INJECTION, SOLUTION INTRAMUSCULAR; INTRAVENOUS at 11:40

## 2021-12-14 RX ADMIN — ONDANSETRON 4 MG: 2 INJECTION INTRAMUSCULAR; INTRAVENOUS at 12:06

## 2021-12-14 RX ADMIN — SODIUM CHLORIDE, POTASSIUM CHLORIDE, SODIUM LACTATE AND CALCIUM CHLORIDE: 600; 310; 30; 20 INJECTION, SOLUTION INTRAVENOUS at 11:40

## 2021-12-14 RX ADMIN — DEXAMETHASONE SODIUM PHOSPHATE 12 MG: 4 INJECTION, SOLUTION INTRA-ARTICULAR; INTRALESIONAL; INTRAMUSCULAR; INTRAVENOUS; SOFT TISSUE at 12:06

## 2021-12-14 RX ADMIN — OXYMETAZOLINE HCL 2 SPRAY: 0.05 SPRAY NASAL at 10:42

## 2021-12-14 ASSESSMENT — FIBROSIS 4 INDEX: FIB4 SCORE: 0.8

## 2021-12-14 ASSESSMENT — PAIN SCALES - GENERAL: PAIN_LEVEL: 0

## 2021-12-14 ASSESSMENT — PAIN DESCRIPTION - PAIN TYPE: TYPE: SURGICAL PAIN

## 2021-12-14 NOTE — ANESTHESIA PREPROCEDURE EVALUATION
Case: [id number] Date/Time: 12/14/21 1130    Procedures:       LARYNGOSCOPY,DIRECT,WITH BIOPSY IF INDICATED      TRACHEAL DILATION,TRACHEOPLASTY,USING BALLOON    Pre-op diagnosis: STENOSIS OF LARYNX    Location: [facility] ROOM 22 / SURGERY SAME DAY [facility]    Surgeons: Johnny Martines M.D.          Relevant Problems   NEURO   (positive) Seizure disorder (HCC)      CARDIAC   (positive) Aortic insufficiency   (positive) Heart murmur       Physical Exam    Anesthesia Plan    ASA 3       Plan - general       Airway plan will be ETT          Induction: intravenous    Postoperative Plan: Postoperative administration of opioids is intended.    Pertinent diagnostic labs and testing reviewed    Informed Consent:    Anesthetic plan and risks discussed with patient.    Use of blood products discussed with: patient whom consented to blood products.

## 2021-12-14 NOTE — OR NURSING
1220: Patient from OR to PACU unresponsive. Jaw thrust to open airway. OPA in place. 8L via mask. 2 identifiers verification completed.  Report from anesthesia and or rn.  Attached to monitors.  No sign of distress.    1230- oral airway dc    1300- phase 2, no needs.  Declines pain/nuasea    1340- patient changed into own clothing.  Ready for DC.  DC instructions reviewed with spouse.  All questions answered.  All belongings accounted for.  Taken down via wheelchair by RN.

## 2021-12-14 NOTE — ANESTHESIA TIME REPORT
Anesthesia Start and Stop Event Times     Date Time Event    12/14/2021 1114 Ready for Procedure     1140 Anesthesia Start        Responsible Staff  12/14/21    Name Role Begin End    Mazin Miles M.D. Anesth 1140         Preop Diagnosis (Free Text):  Pre-op Diagnosis     STENOSIS OF LARYNX        Preop Diagnosis (Codes):    Premium Reason  Non-Premium    Comments:

## 2021-12-14 NOTE — ANESTHESIA POSTPROCEDURE EVALUATION
Patient: Umesh Chang    Procedure Summary     Date: 12/14/21 Room / Location: [facility] ROOM 22 / SURGERY SAME DAY [facility]    Anesthesia Start: 1140 Anesthesia Stop: 1223    Procedures:       LARYNGOSCOPY,DIRECT,WITH BIOPSY IF INDICATED (N/A Throat)      TRACHEAL DILATION,TRACHEOPLASTY,USING BALLOON Diagnosis: (STENOSIS OF LARYNX)    Surgeons: Johnny Martines M.D. Responsible Provider: Mazin Miles M.D.    Anesthesia Type: general ASA Status: 3          Final Anesthesia Type: general  Last vitals  BP   Blood Pressure: 138/82    Temp   36.3 °C (97.3 °F)    Pulse   65   Resp   18    SpO2   100 %      Anesthesia Post Evaluation    Patient location during evaluation: PACU  Patient participation: complete - patient participated  Level of consciousness: awake and alert  Pain score: 0    Airway patency: patent  Anesthetic complications: no  Cardiovascular status: hemodynamically stable  Respiratory status: acceptable  Hydration status: euvolemic    PONV: none          No complications documented.     Nurse Pain Score: 0 (NPRS)

## 2021-12-14 NOTE — OP REPORT
DATE OF SERVICE: 12/14/21     SURGEON:  Johnny Martines MD.     ASSISTANT:  None     ANESTHESIOLOGIST:  Dr. Miles     PREOPERATIVE DIAGNOSIS:  Subglottic stenosis.     POSTOPERATIVE DIAGNOSIS:  Subglottic stenosis.     INDICATIONS FOR PROCEDURE:  The patient was intubated last fall s/p a presumed seizure. He presented with increased work of breathing and was first dilated 11/20/20, then 12/31/20, and most recently 2/23/21. He has been doing well for 10 months, but had mild increased work of breathing and stridor with inspiration.  Risks, benefits, and alternatives   to the procedure were discussed with the patient preoperatively and the patient gave their informed written consent.     OPERATIVE FINDINGS:  Posterior opening 5-6mm, previously <4mm. Gold laser on 8W used then dilated to 15 mm with the [employer] balloon catheter.     DESCRIPTION OF PROCEDURE:  The patient was identified in the preoperative   holding area and brought to the operating room.  A general anesthetic was   administered and the patient was turned 90 degrees towards the surgeon after confirming the patient could be ventillated.  A tooth guard was placed then the modified subglottiscope was used to gain visualization of the vocal cords.  The 5 mm   telescope was used to take photographs, and could pass through the stenosis for the first time.  ~0.3cc of 40 mg/mL Kenalog   was injected through a urologic needle into the scar band and then the gold   laser on a setting of 8 was used to make 2 incisions, at 11 and 1 o'clock.  The balloon catheter was dilated to 13.5mm then to 15mm for 30 seconds.    Photodocumentation was obtained and the patient was awoken and brought to the   postanesthesia care unit in good condition.     ESTIMATED BLOOD LOSS:  <5 mL.     URINE OUTPUT:  Not recorded.     COMPLICATIONS:  None.     SPECIMENS REMOVED:  None.

## 2021-12-14 NOTE — DISCHARGE INSTRUCTIONS
"  ACTIVITY: Rest and take it easy for the first 24 hours.  A responsible adult is recommended to remain with you during that time.  It is normal to feel sleepy.  We encourage you to not do anything that requires balance, judgment or coordination.    MILD FLU-LIKE SYMPTOMS ARE NORMAL. YOU MAY EXPERIENCE GENERALIZED MUSCLE ACHES, THROAT IRRITATION, HEADACHE AND/OR SOME NAUSEA.    FOR 24 HOURS DO NOT:  Drive, operate machinery or run household appliances.  Drink beer or alcoholic beverages.   Make important decisions or sign legal documents.    SPECIAL INSTRUCTIONS: SEE BELOW \"LARYNGOSCOPY\"    DIET: To avoid nausea, slowly advance diet as tolerated, avoiding spicy or greasy foods for the first day.  Add more substantial food to your diet according to your physician's instructions.  INCREASE FLUIDS AND FIBER TO AVOID CONSTIPATION.    SURGICAL DRESSING/BATHING: Ok to bathe normally tomorrow    FOLLOW-UP APPOINTMENT:  A follow-up appointment should be arranged with your doctor; call to schedule.    You should CALL YOUR PHYSICIAN if you develop:  Fever greater than 101 degrees F.  Pain not relieved by medication, or persistent nausea or vomiting.  Excessive bleeding (blood soaking through dressing) or unexpected drainage from the wound.  Extreme redness or swelling around the incision site, drainage of pus or foul smelling drainage.  Inability to urinate or empty your bladder within 8 hours.  Problems with breathing or chest pain.    You should call 911 if you develop problems with breathing or chest pain.  If you are unable to contact your doctor or surgical center, you should go to the nearest emergency room or urgent care center.    Dr. Martines's telephone #: [phone]    If any questions arise, call your doctor.  If your doctor is not available, please feel free to call the Surgical Center at [phone].     A registered nurse may call you a few days after your surgery to see how you are doing after your " procedure.    MEDICATIONS: Resume taking daily medication.  Take prescribed pain medication with food.  If no medication is prescribed, you may take non-aspirin pain medication if needed.  PAIN MEDICATION CAN BE VERY CONSTIPATING.  Take a stool softener or laxative such as senokot, pericolace, or milk of magnesia if needed.      If your physician has prescribed pain medication that includes Acetaminophen (Tylenol), do not take additional Acetaminophen (Tylenol) while taking the prescribed medication.    Depression / Suicide Risk    As you are discharged from this [facility] facility, it is important to learn how to keep safe from harming yourself.    Recognize the warning signs:  · Abrupt changes in personality, positive or negative- including increase in energy   · Giving away possessions  · Change in eating patterns- significant weight changes-  positive or negative  · Change in sleeping patterns- unable to sleep or sleeping all the time   · Unwillingness or inability to communicate  · Depression  · Unusual sadness, discouragement and loneliness  · Talk of wanting to die  · Neglect of personal appearance   · Rebelliousness- reckless behavior  · Withdrawal from people/activities they love  · Confusion- inability to concentrate     If you or a loved one observes any of these behaviors or has concerns about self-harm, here's what you can do:  · Talk about it- your feelings and reasons for harming yourself  · Remove any means that you might use to hurt yourself (examples: pills, rope, extension cords, firearm)  · Get professional help from the community (Mental Health, Substance Abuse, psychological counseling)  · Do not be alone:Call your Safe Contact- someone whom you trust who will be there for you.  · Call your local CRISIS HOTLINE 408-3042 or 458-884-0723  · Call your local Children's Mobile Crisis Response Team [place] (531) 680-4821 or www.Search123  · Call the toll free National Suicide  Prevention Hotlines   · National Suicide Prevention Lifeline 406-958-BRZO (8135)  · National Hope Line Network 800-SUICIDE (331-2117)

## 2022-05-27 ENCOUNTER — HOSPITAL ENCOUNTER (EMERGENCY)
Facility: MEDICAL CENTER | Age: 32
End: 2022-05-27
Attending: EMERGENCY MEDICINE
Payer: OTHER MISCELLANEOUS

## 2022-05-27 ENCOUNTER — APPOINTMENT (OUTPATIENT)
Dept: RADIOLOGY | Facility: MEDICAL CENTER | Age: 32
End: 2022-05-27
Attending: EMERGENCY MEDICINE
Payer: OTHER MISCELLANEOUS

## 2022-05-27 VITALS
BODY MASS INDEX: 33.49 KG/M2 | DIASTOLIC BLOOD PRESSURE: 87 MMHG | TEMPERATURE: 97.4 F | SYSTOLIC BLOOD PRESSURE: 139 MMHG | OXYGEN SATURATION: 97 % | HEART RATE: 92 BPM | WEIGHT: 239.2 LBS | RESPIRATION RATE: 18 BRPM | HEIGHT: 71 IN

## 2022-05-27 DIAGNOSIS — M25.461 KNEE EFFUSION, RIGHT: ICD-10-CM

## 2022-05-27 DIAGNOSIS — M23.91 INTERNAL DERANGEMENT OF RIGHT KNEE: ICD-10-CM

## 2022-05-27 PROCEDURE — 99284 EMERGENCY DEPT VISIT MOD MDM: CPT

## 2022-05-27 PROCEDURE — 73560 X-RAY EXAM OF KNEE 1 OR 2: CPT | Mod: RT

## 2022-05-27 ASSESSMENT — FIBROSIS 4 INDEX: FIB4 SCORE: 0.8

## 2022-05-28 NOTE — ED PROVIDER NOTES
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 At the time, immediately felt pain & thought his \"bones were misaligned\", twisted leg & thinks he saw his knee cap \"snap back into place\". Can weight bear.        HPI  Umesh Chang is a 31 y.o. male who presents to the emergency department with right knee pain and swelling. Past medical history as below. He explains that today he was working his regular job as a postal  when he was trying to escape from a dog chasing after him at a local residence. He then jumped into his vehicle but believes he may have twisted and or hit his knee on the vehicle when trying to enter it quickly. Now with increasing pain and swelling and difficulty with ambulation as this does aggravate the pain to the knee. He states that while at rest and keeping the leg elevated his pain is relatively minimal but more moderate with attempted ambulation. Denies any hip leg ankle or foot pain. No numbness or tingling. No other injuries in this incident.    REVIEW OF SYSTEMS  See HPI for further details. All other systems are negative.     PAST MEDICAL HISTORY   has a past medical history of Benign heart murmur, Breath shortness (12/2020), History of COVID-19 (09/17/2021), Pneumonia (09/2020), Seizure disorder (HCC), and Snoring.    SOCIAL HISTORY  Social History     Tobacco Use   • Smoking status: Never Smoker   • Smokeless tobacco: Never Used   Vaping Use   • Vaping Use: Never used   Substance and Sexual Activity   • Alcohol use: Yes     Comment: one beer every 3 weeks or so   • Drug use: No   • Sexual activity: Not Currently       SURGICAL HISTORY   has a past surgical history that includes laryngoscopy (N/A, 11/20/2020); tonsillectomy; bronchoscopy,diagnostic (N/A, 12/31/2020); laryngoscopy " "(N/A, 12/31/2020); other; laryngoscopy (2/23/2021); laryngoscopy,dirct,op,biopsy (N/A, 12/14/2021); and bronchoscopy,trach/bronch dilatn (N/A, 12/14/2021).    CURRENT MEDICATIONS  Home Medications    **Home medications have not yet been reviewed for this encounter**         ALLERGIES  No Known Allergies    PHYSICAL EXAM  VITAL SIGNS: /87   Pulse 92   Temp 36.3 °C (97.4 °F) (Tympanic)   Resp 18   Ht 1.803 m (5' 11\")   Wt 109 kg (239 lb 3.2 oz)   SpO2 97%   BMI 33.36 kg/m²  @ROSEANN[[id number]::@  Pulse ox interpretation: I interpret this pulse ox as normal.  Constitutional: Alert in no apparent distress.  HENT: Normocephalic, Atraumatic  RLE: nontender hip, thigh, leg ankle or foot. Mild to moderate joint effusion of the knee. Patella is midline. No significant point tenderness. Slight decreased range of motion secondary to pain. No pain or abnormalities in the popliteal fossa. Distal neurovascular motor intact.  Skin: Warm, Dry, No erythema, No rash.   Neurologic: Alert, Grossly non-focal.   Psychiatric: Affect normal, Judgment normal, Mood normal, Appears appropriate and not intoxicated.     DX-KNEE 2- RIGHT   Final Result      There is a small joint effusion. No acute osseous abnormality is identified.              COURSE & MEDICAL DECISION MAKING  Pertinent Labs & Imaging studies reviewed. (See chart for details)  31-year-old presented emergency room with knee pain. History as above. X-ray imaging negative. Likely internal derangement/blunt trauma injury. Patient has been placed and knee immobilizer and has been provided with crutches. Will continue with ongoing supportive care measures including R ICE as well as Tylenol and NSAIDs. Has been referred to occupational health for further outpatient care and workup and possible referral to orthopedics.       The patient will return for worsening symptoms and is stable at the time of discharge. The patient verbalizes understanding and will comply.    FINAL " IMPRESSION  1. Internal derangement of right knee    2. Knee effusion, right               Electronically signed by: Adalberto Harris M.D., 5/27/2022 6:57 PM

## 2022-05-28 NOTE — ED NOTES
Patient educated on discharge instructions, follow up appointments, prescriptions, and home care. Patient verbalized understanding. Patient ambulated with crutches to  gil.

## 2022-05-28 NOTE — ED TRIAGE NOTES
"Umeshcherelle Chang  31 y.o.  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 At the time, immediately felt pain & thought his \"bones were misaligned\", twisted leg & thinks he saw his knee cap \"snap back into place\". Can weight bear.        Pain 7/10. Patient educated on triage process, to alert staff if any changes in condition. Ambulatory in to triage, assisted into W/C.    "

## 2022-06-06 ENCOUNTER — OCCUPATIONAL MEDICINE (OUTPATIENT)
Dept: OCCUPATIONAL MEDICINE | Facility: CLINIC | Age: 32
End: 2022-06-06
Payer: OTHER MISCELLANEOUS

## 2022-06-06 VITALS
OXYGEN SATURATION: 93 % | WEIGHT: 237 LBS | TEMPERATURE: 36.4 F | HEIGHT: 71 IN | RESPIRATION RATE: 16 BRPM | DIASTOLIC BLOOD PRESSURE: 98 MMHG | BODY MASS INDEX: 33.18 KG/M2 | SYSTOLIC BLOOD PRESSURE: 138 MMHG | HEART RATE: 88 BPM

## 2022-06-06 DIAGNOSIS — M25.461 KNEE EFFUSION, RIGHT: ICD-10-CM

## 2022-06-06 DIAGNOSIS — M23.91 INTERNAL DERANGEMENT OF RIGHT KNEE: ICD-10-CM

## 2022-06-06 PROCEDURE — 99213 OFFICE O/P EST LOW 20 MIN: CPT | Performed by: NURSE PRACTITIONER

## 2022-06-06 ASSESSMENT — FIBROSIS 4 INDEX: FIB4 SCORE: 0.8

## 2022-06-06 NOTE — LETTER
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    Medical Information   Related to Industrial Injury? Yes    Subjective Complaints:  DOI 5/26/22: Patient was trying to escape from a dog chasing after him at a local residence. He then jumped into his vehicle but believes he may have twisted and or hit his knee on the vehicle when trying to enter it quickly.  Patient seen in ED x1.  Diagnostic imaging positive for small joint effusion.  Today symptoms are gradually improving.  He continues to note discomfort with bringing his knee back towards his body.  He states he has not noticed any significant knee instability.  He states that he continues to have intermittent swelling and joint pain.  He denies numbness, tingling, or overall weakness in the knee joint.  He has been icing and massaging the joint with some relief.  He has not been back to work since the incident.  Light duty restrictions in place.  MRI ordered and orthopedic referral placed for further evaluation and management of symptoms.  Plan of care discussed with patient.   Objective Findings: Right knee: No gross deformity or discoloration noted. Mild joint effusion of the knee. Patella is midline.  Mild bilateral point tenderness to the medial and lateral joint line.  Minimally decreased range of motion secondary to pain. No pain or abnormalities in the popliteal fossa. Distal neurovascular motor intact.   Nonantalgic gait.   Pre-Existing Condition(s):     Assessment:   Condition Improved    Status: Discharged / Care Transfer  Permanent Disability:No    Plan: Transfer CareDiagnostics    Diagnostics: MRI    Comments:  Follow-up in 3 weeks, if not seen by orthopedics  Restricted duty, per orthopedics  Orthopedic referral placed, transfer care  MRI ordered  Recommend continue with knee brace while walking with periodic breaks as needed for comfort  Recommend continue with OTC Tylenol/ibuprofen, ice, elevation, and gentle range of motion stretching exercises as tolerated    Disability Information   Status: Released to Restricted Duty    From:  6/6/2022  Through: 6/27/2022 Restrictions are: Temporary   Physical Restrictions   Sitting:    Standing:    Stooping:    Bending:      Squatting:  < or = to 2 hrs/day Walking:    Climbing:    Comments:Recommend taking frequent breaks from climbing as needed for comfort Pushing:      Pulling:    Other:    Reaching Above Shoulder (L):   Reaching Above Shoulder (R):       Reaching Below Shoulder (L):    Reaching Below Shoulder (R):      Not to exceed Weight Limits   Carrying(hrs):   Weight Limit(lb):   Lifting(hrs):   Weight  Limit(lb):     Comments:      Repetitive Actions   Hands: i.e. Fine Manipulations from Grasping:     Feet: i.e. Operating Foot Controls:     Driving / Operate Machinery:     Health Care Provider’s Original or Electronic Signature  NA Nolan. Health Care Provider’s Original or Electronic Signature    Luigi Chris MD         Clinic Name / Location: [address],   [address]  [place] [address] Clinic Phone Number: Dept: [phone]   Appointment Time: 8:30 Am Visit Start Time: 8:25 AM   Check-In Time:  8:21 Am Visit Discharge Time:  8:54am   Original-Treating Physician or Chiropractor    Page 2-Insurer/TPA    Page 3-Employer    Page 4-Employee

## 2022-06-06 NOTE — LETTER
"EMPLOYEE’S CLAIM FOR COMPENSATION/ REPORT OF INITIAL TREATMENT  FORM C-4    EMPLOYEE’S CLAIM - PROVIDE ALL INFORMATION REQUESTED   First Name  Umesh Last Name  Bernardo Birthdate                    1990                Sex  male Claim Number (Insurer’s Use Only)    Home Address  [address] Age  31 y.o. 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 (Be specific an answer in detail. Use additional sheet if necessary)  I was chased by two dogs attempting to deliver person mail   If you believe that you have an occupational disease, when did you first have knowledge of the disability and it relationship to your employment?  N/A Witnesses to the Accident  N/A      Nature of Injury or Occupational Disease  Workers' Compensation  Part(s) of Body Injured or Affected  Knee (R), N/A, N/A    I certify that the above is true and correct to the best of my knowledge and that I have provided this information in order to obtain the " benefits of [place]  I hereby authorize any physician, chiropractor, surgeon, practitioner, or other person, any hospital, including [facility], any medical service organization, any insurance company, or other institution or organization to release to each other, any medical or other information, including benefits paid or payable, pertinent to this injury or disease, except information relative to diagnosis, treatment and/or counseling for AIDS, psychological conditions, alcohol or controlled substances, for which I must give specific authorization.  A Photostat of this authorization shall be as valid as the original.     Date   Place Employee’s Original or  *Electronic Signature   THIS REPORT MUST BE COMPLETED AND MAILED WITHIN 3 WORKING DAYS OF TREATMENT   Place  [facility]  Name of [facility]   Date  6/6/2022 Diagnosis and Description of Injury or Occupational Disease  (M23.91) Internal derangement of right knee  (M25.461) Knee effusion, right Is there evidence the injured employee was under the influence of alcohol and/or another controlled substance at the time of accident?  ? No ? Yes (if yes, please explain)    Hour  8:25 AM   Diagnoses of Internal derangement of right knee and Knee effusion, right were pertinent to this visit. No   Treatment  Orthopedics referral and MRI  Have you advised the patient to remain off work five days or     more?    X-Ray Findings    Comments:Positive small joint effusion   ? Yes Indicate dates:   From   To      From information given by the employee, together with medical evidence, can        you directly connect this injury or occupational disease as job incurred?  Yes ? No If no, is the injured employee capable of:  ? full duty  No ? modified duty  Yes   Is additional medical care by a physician  "indicated?  Yes If Modified Duty, Specify any Limitations / Restrictions  See D 39   Do you know of any previous injury or disease contributing to this condition or occupational disease?  ? Yes ? No (Explain if yes)                          No   Date  6/6/2022 Print Health Care Provider's   CHAKA Nolan I certify the employer’s copy of  this form was mailed on:   Address  [address],   [address] Insurer’s Use Only     [place] Zip  [address]    Provider’s Tax ID Number  [id number] Telephone  Dept: [phone]             Health Care Provider’s Original or Electronic Signature  e-SignWHTIGRE BUSTOS Degree (MD,DO, DC,PAJazielC,APRN)   APRN      * Complete and attach Release of Information (Form C-4A) when injured employee signs C-4 Form electronically  ORIGINAL - TREATING HEALTHCARE PROVIDER PAGE 2 - INSURER/TPA PAGE 3 - EMPLOYER PAGE 4 - EMPLOYEE             Form C-4 (rev.08/21)           BRIEF DESCRIPTION OF RIGHTS AND BENEFITS  (Pursuant to [place] 616[place])    Notice of Injury or Occupational Disease (Incident Report Form C-1): If an injury or occupational disease (OD) arises out of and in the course of employment, you must provide written notice to your employer as soon as practicable, but no later than 7 days after the accident or OD. Your employer shall maintain a sufficient supply of the required forms.    Claim for Compensation (Form C-4): If medical treatment is sought, the form C-4 is available at the place of initial treatment. A completed \"Claim for Compensation\" (Form C-4) must be filed within 90 days after an accident or OD. The treating physician or chiropractor must, within 3 working days after treatment, complete and mail to the employer, the employer's insurer and third-party , the Claim for Compensation.    Medical Treatment: If you require medical treatment for your on-the-job injury or OD, you may be required to select a physician or chiropractor from a list " provided by your workers’ compensation insurer, if it has contracted with an Organization for Managed Care (MCO) or Preferred Provider Organization (PPO) or providers of health care. If your employer has not entered into a contract with an MCO or PPO, you may select a physician or chiropractor from the Panel of Physicians and Chiropractors. Any medical costs related to your industrial injury or OD will be paid by your insurer.    Temporary Total Disability (TTD): If your doctor has certified that you are unable to work for a period of at least 5 consecutive days, or 5 cumulative days in a 20-day period, or places restrictions on you that your employer does not accommodate, you may be entitled to TTD compensation.    Temporary Partial Disability (TPD): If the wage you receive upon reemployment is less than the compensation for TTD to which you are entitled, the insurer may be required to pay you TPD compensation to make up the difference. TPD can only be paid for a maximum of 24 months.    Permanent Partial Disability (PPD): When your medical condition is stable and there is an indication of a PPD as a result of your injury or OD, within 30 days, your insurer must arrange for an evaluation by a rating physician or chiropractor to determine the degree of your PPD. The amount of your PPD award depends on the date of injury, the results of the PPD evaluation, your age and wage.    Permanent Total Disability (PTD): If you are medically certified by a treating physician or chiropractor as permanently and totally disabled and have been granted a PTD status by your insurer, you are entitled to receive monthly benefits not to exceed 66 2/3% of your average monthly wage. The amount of your PTD payments is subject to reduction if you previously received a lump-sum PPD award.    Vocational Rehabilitation Services: You may be eligible for vocational rehabilitation services if you are unable to return to the job due to a  permanent physical impairment or permanent restrictions as a result of your injury or occupational disease.    Transportation and Per Ben Reimbursement: You may be eligible for travel expenses and per ben associated with medical treatment.    Reopening: You may be able to reopen your claim if your condition worsens after claim closure.     Appeal Process: If you disagree with a written determination issued by the insurer or the insurer does not respond to your request, you may appeal to the Department of Administration, , by following the instructions contained in your determination letter. You must appeal the determination within 70 days from the date of the determination letter at [address], [address], [place], [place] [address], or [address], [address], [place] [address]. If you disagree with the  decision, you may appeal to the Department of Administration, . You must file your appeal within 30 days from the date of the  decision letter at [address], [address], [place], [place] [address], or [address] [facility] [facility] [address], [place] [address]. If you disagree with a decision of an , you may file a petition for judicial review with the District Court. You must do so within 30 days of the Appeal Officer’s decision. You may be represented by an  at your own expense or you may contact the [facility] for possible representation.    [place]  for Injured Workers (NAIW): If you disagree with a  decision, you may request that NAIW represent you without charge at an  Hearing. For information regarding denial of benefits, you may contact the [facility] at: [address], [address], [place] [address], [phone], or [address] [address], [phone]    To File a Complaint with the Division: If you wish to file a  complaint with the  of the Division of Industrial Relations (DIR),  please contact the Workers’ Compensation Section, [address], [address], [place], [place] [address], telephone [phone], or [address], [address], [place], [place] [address], telephone [phone].    For assistance with Workers’ Compensation Issues: You may contact the [facility] Office for Consumer Health Assistance, [address], [address], [place], [place] [address], Toll Free 1-319.240.1463, Web site: http://[facility].[place].gov/Programs/TAMAR E-mail: [email or web address]              __________________________________________________________________                                    _________________            Employee Name / Signature                                                                                                                            Date                                                                                                                                                                                                                              D-2 (rev. 10/20)

## 2022-06-06 NOTE — PROGRESS NOTES
"Subjective:     Umesh Chang is a 31 y.o. male who presents for Other (WC DOI 5/26/22 rt knee injury, feeling BETTER room 16)      DOI 5/26/22: Patient was trying to escape from a dog chasing after him at a local residence. He then jumped into his vehicle but believes he may have twisted and or hit his knee on the vehicle when trying to enter it quickly.  Patient seen in ED x1.  Diagnostic imaging positive for small joint effusion.  Today symptoms are gradually improving.  He continues to note discomfort with bringing his knee back towards his body.  He states he has not noticed any significant knee instability.  He states that he continues to have intermittent swelling and joint pain.  He denies numbness, tingling, or overall weakness in the knee joint.  He has been icing and massaging the joint with some relief.  He has not been back to work since the incident.  Light duty restrictions in place.  MRI ordered and orthopedic referral placed for further evaluation and management of symptoms.  Plan of care discussed with patient.    ROS: All systems were reviewed on intake form, form was reviewed and signed. See scanned documents in media. Pertinent positives and negatives included in HPI.    PMH: No pertinent past medical history to this problem  MEDS: Medications were reviewed in Epic  ALLERGIES: No Known Allergies  SOCHX: Works as  at [employer]  FH: No pertinent family history to this problem       Objective:     BP (!) 138/98   Pulse 88   Temp (!) 2.4 °C (36.4 °F)   Resp 16   Ht 1.803 m (5' 11\")   Wt 108 kg (237 lb)   SpO2 93%   BMI 33.05 kg/m²     [unfilled]    Right knee: No gross deformity or discoloration noted. Mild joint effusion of the knee. Patella is midline.  Mild bilateral point tenderness to the medial and lateral joint line.  Minimally decreased range of motion secondary to pain. No pain or abnormalities in the popliteal fossa. Distal neurovascular motor intact.  Nonantalgic " gait.    Assessment/Plan:       1. Internal derangement of right knee  - MR-KNEE-W/O RIGHT; Future  - Referral to Radiology  - Referral to Orthopedics    2. Knee effusion, right  - MR-KNEE-W/O RIGHT; Future  - Referral to Radiology  - Referral to Orthopedics    Released to Restricted Duty FROM 6/6/2022 TO 6/27/2022     Follow-up in 3 weeks, if not seen by orthopedics  Restricted duty, per orthopedics  Orthopedic referral placed, transfer care  MRI ordered  Recommend continue with knee brace while walking with periodic breaks as needed for comfort  Recommend continue with OTC Tylenol/ibuprofen, ice, elevation, and gentle range of motion stretching exercises as tolerated    Differential diagnosis, natural history, supportive care, and indications for immediate follow-up discussed.    Approximately 25 minutes were spent in reviewing notes, preparing for visit, obtaining history, exam and evaluation, patient counseling/education and post visit documentation/orders.

## 2022-06-20 ENCOUNTER — HOSPITAL ENCOUNTER (OUTPATIENT)
Dept: RADIOLOGY | Facility: MEDICAL CENTER | Age: 32
End: 2022-06-20
Attending: NURSE PRACTITIONER
Payer: OTHER MISCELLANEOUS

## 2022-06-20 DIAGNOSIS — M25.461 KNEE EFFUSION, RIGHT: ICD-10-CM

## 2022-06-20 DIAGNOSIS — M23.91 INTERNAL DERANGEMENT OF RIGHT KNEE: ICD-10-CM

## 2022-06-20 PROCEDURE — 73721 MRI JNT OF LWR EXTRE W/O DYE: CPT | Mod: RT

## 2024-02-23 ENCOUNTER — APPOINTMENT (OUTPATIENT)
Dept: RADIOLOGY | Facility: MEDICAL CENTER | Age: 34
DRG: 218 | End: 2024-02-23
Attending: STUDENT IN AN ORGANIZED HEALTH CARE EDUCATION/TRAINING PROGRAM
Payer: COMMERCIAL

## 2024-02-23 ENCOUNTER — HOSPITAL ENCOUNTER (INPATIENT)
Facility: MEDICAL CENTER | Age: 34
LOS: 9 days | DRG: 218 | End: 2024-03-03
Attending: STUDENT IN AN ORGANIZED HEALTH CARE EDUCATION/TRAINING PROGRAM | Admitting: STUDENT IN AN ORGANIZED HEALTH CARE EDUCATION/TRAINING PROGRAM
Payer: COMMERCIAL

## 2024-02-23 DIAGNOSIS — I21.4 NSTEMI (NON-ST ELEVATED MYOCARDIAL INFARCTION) (HCC): ICD-10-CM

## 2024-02-23 DIAGNOSIS — Z95.2 S/P AVR (AORTIC VALVE REPLACEMENT): Primary | ICD-10-CM

## 2024-02-23 DIAGNOSIS — G89.18 ACUTE POST-OPERATIVE PAIN: ICD-10-CM

## 2024-02-23 PROBLEM — Z87.898 HISTORY OF SEIZURE: Status: ACTIVE | Noted: 2024-02-23

## 2024-02-23 LAB
ALBUMIN SERPL BCP-MCNC: 4.9 G/DL (ref 3.2–4.9)
ALBUMIN/GLOB SERPL: 1.6 G/DL
ALP SERPL-CCNC: 60 U/L (ref 30–99)
ALT SERPL-CCNC: 27 U/L (ref 2–50)
ANION GAP SERPL CALC-SCNC: 13 MMOL/L (ref 7–16)
APTT PPP: 30.4 SEC (ref 24.7–36)
AST SERPL-CCNC: 21 U/L (ref 12–45)
BASOPHILS # BLD AUTO: 0.7 % (ref 0–1.8)
BASOPHILS # BLD: 0.05 K/UL (ref 0–0.12)
BILIRUB SERPL-MCNC: 0.4 MG/DL (ref 0.1–1.5)
BUN SERPL-MCNC: 12 MG/DL (ref 8–22)
CALCIUM ALBUM COR SERPL-MCNC: 8.7 MG/DL (ref 8.5–10.5)
CALCIUM SERPL-MCNC: 9.4 MG/DL (ref 8.5–10.5)
CHLORIDE SERPL-SCNC: 101 MMOL/L (ref 96–112)
CHOLEST SERPL-MCNC: 173 MG/DL (ref 100–199)
CO2 SERPL-SCNC: 25 MMOL/L (ref 20–33)
CREAT SERPL-MCNC: 0.66 MG/DL (ref 0.5–1.4)
EKG IMPRESSION: NORMAL
EOSINOPHIL # BLD AUTO: 0.03 K/UL (ref 0–0.51)
EOSINOPHIL NFR BLD: 0.4 % (ref 0–6.9)
ERYTHROCYTE [DISTWIDTH] IN BLOOD BY AUTOMATED COUNT: 35 FL (ref 35.9–50)
EST. AVERAGE GLUCOSE BLD GHB EST-MCNC: 105 MG/DL
GFR SERPLBLD CREATININE-BSD FMLA CKD-EPI: 126 ML/MIN/1.73 M 2
GLOBULIN SER CALC-MCNC: 3 G/DL (ref 1.9–3.5)
GLUCOSE SERPL-MCNC: 89 MG/DL (ref 65–99)
HBA1C MFR BLD: 5.3 % (ref 4–5.6)
HCT VFR BLD AUTO: 45.9 % (ref 42–52)
HDLC SERPL-MCNC: 50 MG/DL
HGB BLD-MCNC: 15.2 G/DL (ref 14–18)
IMM GRANULOCYTES # BLD AUTO: 0.02 K/UL (ref 0–0.11)
IMM GRANULOCYTES NFR BLD AUTO: 0.3 % (ref 0–0.9)
INR PPP: 0.94 (ref 0.87–1.13)
LDLC SERPL CALC-MCNC: 103 MG/DL
LYMPHOCYTES # BLD AUTO: 2.79 K/UL (ref 1–4.8)
LYMPHOCYTES NFR BLD: 36.4 % (ref 22–41)
MAGNESIUM SERPL-MCNC: 2 MG/DL (ref 1.5–2.5)
MCH RBC QN AUTO: 23.7 PG (ref 27–33)
MCHC RBC AUTO-ENTMCNC: 33.1 G/DL (ref 32.3–36.5)
MCV RBC AUTO: 71.5 FL (ref 81.4–97.8)
MONOCYTES # BLD AUTO: 0.55 K/UL (ref 0–0.85)
MONOCYTES NFR BLD AUTO: 7.2 % (ref 0–13.4)
NEUTROPHILS # BLD AUTO: 4.22 K/UL (ref 1.82–7.42)
NEUTROPHILS NFR BLD: 55 % (ref 44–72)
NRBC # BLD AUTO: 0 K/UL
NRBC BLD-RTO: 0 /100 WBC (ref 0–0.2)
NT-PROBNP SERPL IA-MCNC: 60 PG/ML (ref 0–125)
PHOSPHATE SERPL-MCNC: 2.5 MG/DL (ref 2.5–4.5)
PLATELET # BLD AUTO: 260 K/UL (ref 164–446)
PMV BLD AUTO: 11.1 FL (ref 9–12.9)
POTASSIUM SERPL-SCNC: 3.7 MMOL/L (ref 3.6–5.5)
PROT SERPL-MCNC: 7.9 G/DL (ref 6–8.2)
PROTHROMBIN TIME: 12.7 SEC (ref 12–14.6)
RBC # BLD AUTO: 6.42 M/UL (ref 4.7–6.1)
SODIUM SERPL-SCNC: 139 MMOL/L (ref 135–145)
TRIGL SERPL-MCNC: 98 MG/DL (ref 0–149)
TROPONIN T SERPL-MCNC: 23 NG/L (ref 6–19)
UFH PPP CHRO-ACNC: <0.1 IU/ML
WBC # BLD AUTO: 7.7 K/UL (ref 4.8–10.8)

## 2024-02-23 PROCEDURE — 83036 HEMOGLOBIN GLYCOSYLATED A1C: CPT

## 2024-02-23 PROCEDURE — 36415 COLL VENOUS BLD VENIPUNCTURE: CPT

## 2024-02-23 PROCEDURE — 71045 X-RAY EXAM CHEST 1 VIEW: CPT

## 2024-02-23 PROCEDURE — 80061 LIPID PANEL: CPT

## 2024-02-23 PROCEDURE — 770020 HCHG ROOM/CARE - TELE (206)

## 2024-02-23 PROCEDURE — 700102 HCHG RX REV CODE 250 W/ 637 OVERRIDE(OP): Performed by: STUDENT IN AN ORGANIZED HEALTH CARE EDUCATION/TRAINING PROGRAM

## 2024-02-23 PROCEDURE — A9270 NON-COVERED ITEM OR SERVICE: HCPCS | Performed by: STUDENT IN AN ORGANIZED HEALTH CARE EDUCATION/TRAINING PROGRAM

## 2024-02-23 PROCEDURE — 84100 ASSAY OF PHOSPHORUS: CPT

## 2024-02-23 PROCEDURE — 99285 EMERGENCY DEPT VISIT HI MDM: CPT

## 2024-02-23 PROCEDURE — A9270 NON-COVERED ITEM OR SERVICE: HCPCS

## 2024-02-23 PROCEDURE — 93005 ELECTROCARDIOGRAM TRACING: CPT

## 2024-02-23 PROCEDURE — 83880 ASSAY OF NATRIURETIC PEPTIDE: CPT

## 2024-02-23 PROCEDURE — 85025 COMPLETE CBC W/AUTO DIFF WBC: CPT

## 2024-02-23 PROCEDURE — 85610 PROTHROMBIN TIME: CPT

## 2024-02-23 PROCEDURE — 96375 TX/PRO/DX INJ NEW DRUG ADDON: CPT

## 2024-02-23 PROCEDURE — 96365 THER/PROPH/DIAG IV INF INIT: CPT

## 2024-02-23 PROCEDURE — 93005 ELECTROCARDIOGRAM TRACING: CPT | Performed by: STUDENT IN AN ORGANIZED HEALTH CARE EDUCATION/TRAINING PROGRAM

## 2024-02-23 PROCEDURE — 84484 ASSAY OF TROPONIN QUANT: CPT

## 2024-02-23 PROCEDURE — 85730 THROMBOPLASTIN TIME PARTIAL: CPT

## 2024-02-23 PROCEDURE — 85520 HEPARIN ASSAY: CPT

## 2024-02-23 PROCEDURE — 80053 COMPREHEN METABOLIC PANEL: CPT

## 2024-02-23 PROCEDURE — 700102 HCHG RX REV CODE 250 W/ 637 OVERRIDE(OP)

## 2024-02-23 PROCEDURE — 83735 ASSAY OF MAGNESIUM: CPT

## 2024-02-23 PROCEDURE — 700111 HCHG RX REV CODE 636 W/ 250 OVERRIDE (IP): Performed by: STUDENT IN AN ORGANIZED HEALTH CARE EDUCATION/TRAINING PROGRAM

## 2024-02-23 PROCEDURE — 99223 1ST HOSP IP/OBS HIGH 75: CPT | Mod: GC | Performed by: STUDENT IN AN ORGANIZED HEALTH CARE EDUCATION/TRAINING PROGRAM

## 2024-02-23 RX ORDER — ATENOLOL 50 MG/1
50 TABLET ORAL DAILY
Status: DISCONTINUED | OUTPATIENT
Start: 2024-02-24 | End: 2024-02-27

## 2024-02-23 RX ORDER — HEPARIN SODIUM 1000 [USP'U]/ML
4000 INJECTION, SOLUTION INTRAVENOUS; SUBCUTANEOUS ONCE
Status: COMPLETED | OUTPATIENT
Start: 2024-02-23 | End: 2024-02-23

## 2024-02-23 RX ORDER — AMOXICILLIN 250 MG
2 CAPSULE ORAL EVERY EVENING
Status: DISCONTINUED | OUTPATIENT
Start: 2024-02-23 | End: 2024-02-27

## 2024-02-23 RX ORDER — AMLODIPINE BESYLATE 10 MG/1
10 TABLET ORAL DAILY
Status: ON HOLD | COMMUNITY
End: 2024-03-03

## 2024-02-23 RX ORDER — HEPARIN SODIUM 1000 [USP'U]/ML
2000 INJECTION, SOLUTION INTRAVENOUS; SUBCUTANEOUS PRN
Status: DISCONTINUED | OUTPATIENT
Start: 2024-02-23 | End: 2024-02-24

## 2024-02-23 RX ORDER — AMLODIPINE BESYLATE 10 MG/1
10 TABLET ORAL DAILY
Status: DISCONTINUED | OUTPATIENT
Start: 2024-02-24 | End: 2024-02-27

## 2024-02-23 RX ORDER — POLYETHYLENE GLYCOL 3350 17 G/17G
1 POWDER, FOR SOLUTION ORAL
Status: DISCONTINUED | OUTPATIENT
Start: 2024-02-23 | End: 2024-02-27

## 2024-02-23 RX ORDER — ATENOLOL 50 MG/1
50 TABLET ORAL DAILY
Status: ON HOLD | COMMUNITY
End: 2024-03-03

## 2024-02-23 RX ORDER — HEPARIN SODIUM 5000 [USP'U]/100ML
0-30 INJECTION, SOLUTION INTRAVENOUS CONTINUOUS
Status: DISCONTINUED | OUTPATIENT
Start: 2024-02-23 | End: 2024-02-24

## 2024-02-23 RX ORDER — ASPIRIN 325 MG
325 TABLET ORAL ONCE
Status: COMPLETED | OUTPATIENT
Start: 2024-02-23 | End: 2024-02-23

## 2024-02-23 RX ORDER — DIVALPROEX SODIUM 500 MG/1
500 TABLET, EXTENDED RELEASE ORAL
Status: DISCONTINUED | OUTPATIENT
Start: 2024-02-23 | End: 2024-02-27

## 2024-02-23 RX ADMIN — DOCUSATE SODIUM 50 MG AND SENNOSIDES 8.6 MG 2 TABLET: 8.6; 5 TABLET, FILM COATED ORAL at 22:11

## 2024-02-23 RX ADMIN — HEPARIN SODIUM 4000 UNITS: 1000 INJECTION, SOLUTION INTRAVENOUS; SUBCUTANEOUS at 22:11

## 2024-02-23 RX ADMIN — DIVALPROEX SODIUM 500 MG: 500 TABLET, EXTENDED RELEASE ORAL at 23:01

## 2024-02-23 RX ADMIN — ASPIRIN 325 MG: 325 TABLET ORAL at 22:11

## 2024-02-23 RX ADMIN — HEPARIN SODIUM 12 UNITS/KG/HR: 5000 INJECTION, SOLUTION INTRAVENOUS at 22:15

## 2024-02-23 ASSESSMENT — ENCOUNTER SYMPTOMS
PALPITATIONS: 0
FEVER: 0
HEADACHES: 0
VOMITING: 0
DIARRHEA: 0
BLURRED VISION: 0
NECK PAIN: 0
HEMOPTYSIS: 0
INSOMNIA: 0
COUGH: 0
NERVOUS/ANXIOUS: 0
SHORTNESS OF BREATH: 0
CHILLS: 0
SORE THROAT: 0
CONSTIPATION: 0
TINGLING: 0
HEARTBURN: 0
BACK PAIN: 0
DOUBLE VISION: 0
NAUSEA: 0
ABDOMINAL PAIN: 0
WEAKNESS: 0
DIZZINESS: 0

## 2024-02-23 ASSESSMENT — LIFESTYLE VARIABLES
TOTAL SCORE: 0
HAVE YOU EVER FELT YOU SHOULD CUT DOWN ON YOUR DRINKING: NO
HOW MANY TIMES IN THE PAST YEAR HAVE YOU HAD 5 OR MORE DRINKS IN A DAY: 0
DOES PATIENT WANT TO STOP DRINKING: NO
AVERAGE NUMBER OF DAYS PER WEEK YOU HAVE A DRINK CONTAINING ALCOHOL: 0
TOTAL SCORE: 0
HAVE PEOPLE ANNOYED YOU BY CRITICIZING YOUR DRINKING: NO
ALCOHOL_USE: YES
TOTAL SCORE: 0
ON A TYPICAL DAY WHEN YOU DRINK ALCOHOL HOW MANY DRINKS DO YOU HAVE: 1
EVER FELT BAD OR GUILTY ABOUT YOUR DRINKING: NO
EVER HAD A DRINK FIRST THING IN THE MORNING TO STEADY YOUR NERVES TO GET RID OF A HANGOVER: NO
CONSUMPTION TOTAL: NEGATIVE

## 2024-02-23 ASSESSMENT — PATIENT HEALTH QUESTIONNAIRE - PHQ9
SUM OF ALL RESPONSES TO PHQ9 QUESTIONS 1 AND 2: 0
1. LITTLE INTEREST OR PLEASURE IN DOING THINGS: NOT AT ALL
2. FEELING DOWN, DEPRESSED, IRRITABLE, OR HOPELESS: NOT AT ALL

## 2024-02-23 ASSESSMENT — COGNITIVE AND FUNCTIONAL STATUS - GENERAL
MOBILITY SCORE: 24
SUGGESTED CMS G CODE MODIFIER MOBILITY: CH
DAILY ACTIVITIY SCORE: 24
SUGGESTED CMS G CODE MODIFIER DAILY ACTIVITY: CH

## 2024-02-23 ASSESSMENT — FIBROSIS 4 INDEX
FIB4 SCORE: 0.51
FIB4 SCORE: 0.51

## 2024-02-23 ASSESSMENT — PAIN DESCRIPTION - PAIN TYPE
TYPE: ACUTE PAIN
TYPE: ACUTE PAIN

## 2024-02-24 ENCOUNTER — APPOINTMENT (OUTPATIENT)
Dept: CARDIOLOGY | Facility: MEDICAL CENTER | Age: 34
DRG: 218 | End: 2024-02-24
Payer: COMMERCIAL

## 2024-02-24 ENCOUNTER — APPOINTMENT (OUTPATIENT)
Dept: RADIOLOGY | Facility: MEDICAL CENTER | Age: 34
DRG: 218 | End: 2024-02-24
Attending: INTERNAL MEDICINE
Payer: COMMERCIAL

## 2024-02-24 PROBLEM — Z87.898 HISTORY OF SEIZURE: Status: RESOLVED | Noted: 2024-02-23 | Resolved: 2024-02-24

## 2024-02-24 PROBLEM — E66.9 OBESITY (BMI 30-39.9): Status: ACTIVE | Noted: 2024-02-24

## 2024-02-24 PROBLEM — I10 HTN (HYPERTENSION): Status: ACTIVE | Noted: 2024-02-24

## 2024-02-24 LAB
ANION GAP SERPL CALC-SCNC: 12 MMOL/L (ref 7–16)
BUN SERPL-MCNC: 11 MG/DL (ref 8–22)
CALCIUM SERPL-MCNC: 8.8 MG/DL (ref 8.5–10.5)
CHLORIDE SERPL-SCNC: 106 MMOL/L (ref 96–112)
CO2 SERPL-SCNC: 23 MMOL/L (ref 20–33)
CREAT SERPL-MCNC: 0.59 MG/DL (ref 0.5–1.4)
EKG IMPRESSION: NORMAL
ERYTHROCYTE [DISTWIDTH] IN BLOOD BY AUTOMATED COUNT: 34.6 FL (ref 35.9–50)
GFR SERPLBLD CREATININE-BSD FMLA CKD-EPI: 131 ML/MIN/1.73 M 2
GLUCOSE SERPL-MCNC: 132 MG/DL (ref 65–99)
HCT VFR BLD AUTO: 41.1 % (ref 42–52)
HGB BLD-MCNC: 13.6 G/DL (ref 14–18)
LV EJECT FRACT  99904: 60
LV EJECT FRACT MOD 2C 99903: 57.82
LV EJECT FRACT MOD 4C 99902: 63.45
LV EJECT FRACT MOD BP 99901: 61.38
MCH RBC QN AUTO: 23.6 PG (ref 27–33)
MCHC RBC AUTO-ENTMCNC: 33.1 G/DL (ref 32.3–36.5)
MCV RBC AUTO: 71.2 FL (ref 81.4–97.8)
PLATELET # BLD AUTO: 244 K/UL (ref 164–446)
PMV BLD AUTO: 11.1 FL (ref 9–12.9)
POTASSIUM SERPL-SCNC: 3.7 MMOL/L (ref 3.6–5.5)
RBC # BLD AUTO: 5.77 M/UL (ref 4.7–6.1)
SODIUM SERPL-SCNC: 141 MMOL/L (ref 135–145)
TROPONIN T SERPL-MCNC: 8 NG/L (ref 6–19)
TROPONIN T SERPL-MCNC: <6 NG/L (ref 6–19)
UFH PPP CHRO-ACNC: 0.15 IU/ML
UFH PPP CHRO-ACNC: 0.26 IU/ML
WBC # BLD AUTO: 8.3 K/UL (ref 4.8–10.8)

## 2024-02-24 PROCEDURE — 700111 HCHG RX REV CODE 636 W/ 250 OVERRIDE (IP): Performed by: STUDENT IN AN ORGANIZED HEALTH CARE EDUCATION/TRAINING PROGRAM

## 2024-02-24 PROCEDURE — A9270 NON-COVERED ITEM OR SERVICE: HCPCS

## 2024-02-24 PROCEDURE — 85520 HEPARIN ASSAY: CPT

## 2024-02-24 PROCEDURE — 93010 ELECTROCARDIOGRAM REPORT: CPT | Performed by: INTERNAL MEDICINE

## 2024-02-24 PROCEDURE — 700111 HCHG RX REV CODE 636 W/ 250 OVERRIDE (IP): Mod: JZ | Performed by: INTERNAL MEDICINE

## 2024-02-24 PROCEDURE — 99223 1ST HOSP IP/OBS HIGH 75: CPT | Performed by: INTERNAL MEDICINE

## 2024-02-24 PROCEDURE — 770020 HCHG ROOM/CARE - TELE (206)

## 2024-02-24 PROCEDURE — 84484 ASSAY OF TROPONIN QUANT: CPT

## 2024-02-24 PROCEDURE — 85027 COMPLETE CBC AUTOMATED: CPT

## 2024-02-24 PROCEDURE — 36415 COLL VENOUS BLD VENIPUNCTURE: CPT

## 2024-02-24 PROCEDURE — 700102 HCHG RX REV CODE 250 W/ 637 OVERRIDE(OP)

## 2024-02-24 PROCEDURE — 93005 ELECTROCARDIOGRAM TRACING: CPT

## 2024-02-24 PROCEDURE — 93306 TTE W/DOPPLER COMPLETE: CPT

## 2024-02-24 PROCEDURE — 700117 HCHG RX CONTRAST REV CODE 255

## 2024-02-24 PROCEDURE — 93306 TTE W/DOPPLER COMPLETE: CPT | Mod: 26 | Performed by: INTERNAL MEDICINE

## 2024-02-24 PROCEDURE — 80048 BASIC METABOLIC PNL TOTAL CA: CPT

## 2024-02-24 PROCEDURE — 99232 SBSQ HOSP IP/OBS MODERATE 35: CPT | Performed by: INTERNAL MEDICINE

## 2024-02-24 RX ORDER — ATORVASTATIN CALCIUM 40 MG/1
40 TABLET, FILM COATED ORAL DAILY
Status: DISCONTINUED | OUTPATIENT
Start: 2024-02-24 | End: 2024-02-27

## 2024-02-24 RX ORDER — ASPIRIN 81 MG/1
81 TABLET ORAL DAILY
Status: DISCONTINUED | OUTPATIENT
Start: 2024-02-24 | End: 2024-02-27

## 2024-02-24 RX ORDER — ENOXAPARIN SODIUM 100 MG/ML
40 INJECTION SUBCUTANEOUS DAILY
Status: DISCONTINUED | OUTPATIENT
Start: 2024-02-24 | End: 2024-02-27

## 2024-02-24 RX ADMIN — ATORVASTATIN CALCIUM 40 MG: 40 TABLET, FILM COATED ORAL at 05:11

## 2024-02-24 RX ADMIN — AMLODIPINE BESYLATE 10 MG: 10 TABLET ORAL at 05:11

## 2024-02-24 RX ADMIN — DIVALPROEX SODIUM 500 MG: 500 TABLET, EXTENDED RELEASE ORAL at 21:58

## 2024-02-24 RX ADMIN — HUMAN ALBUMIN MICROSPHERES AND PERFLUTREN 3 ML: 10; .22 INJECTION, SOLUTION INTRAVENOUS at 14:00

## 2024-02-24 RX ADMIN — DIVALPROEX SODIUM 500 MG: 500 TABLET, EXTENDED RELEASE ORAL at 09:17

## 2024-02-24 RX ADMIN — HEPARIN SODIUM 2000 UNITS: 1000 INJECTION, SOLUTION INTRAVENOUS; SUBCUTANEOUS at 05:58

## 2024-02-24 RX ADMIN — ASPIRIN 81 MG: 81 TABLET, COATED ORAL at 05:12

## 2024-02-24 RX ADMIN — ENOXAPARIN SODIUM 40 MG: 100 INJECTION SUBCUTANEOUS at 17:14

## 2024-02-24 RX ADMIN — HEPARIN SODIUM 16 UNITS/KG/HR: 5000 INJECTION, SOLUTION INTRAVENOUS at 13:36

## 2024-02-24 RX ADMIN — ATENOLOL 50 MG: 50 TABLET ORAL at 05:11

## 2024-02-24 RX ADMIN — HEPARIN SODIUM 2000 UNITS: 1000 INJECTION, SOLUTION INTRAVENOUS; SUBCUTANEOUS at 13:35

## 2024-02-24 ASSESSMENT — PAIN DESCRIPTION - PAIN TYPE: TYPE: ACUTE PAIN

## 2024-02-24 ASSESSMENT — FIBROSIS 4 INDEX: FIB4 SCORE: 0.55

## 2024-02-24 NOTE — ED TRIAGE NOTES
"Chief Complaint   Patient presents with    Chest Pain     X 2 weeks, pt reports 10/10 central chest pressure. +SOB intermittently, -N/V, -dizziness  Denies cardiac hx.        34 yo M to triage for above complaint. EKG completed.      Pt placed in lobby. Pt educated on triage process. Pt encouraged to alert staff for any changes.     Patient and staff wearing appropriate PPE    /73   Pulse 68   Temp 36.2 °C (97.2 °F) (Temporal)   Resp 17   Ht 1.803 m (5' 11\")   Wt 104 kg (230 lb)   SpO2 98%   BMI 32.08 kg/m²     "

## 2024-02-24 NOTE — CARE PLAN
The patient is Stable - Low risk of patient condition declining or worsening         Progress made toward(s) clinical / shift goals:        Problem: Pain - Standard  Goal: Alleviation of pain or a reduction in pain to the patient’s comfort goal  Outcome: Progressing     Problem: Knowledge Deficit - Standard  Goal: Patient and family/care givers will demonstrate understanding of plan of care, disease process/condition, diagnostic tests and medications  Outcome: Progressing       Patient is not progressing towards the following goals:

## 2024-02-24 NOTE — ASSESSMENT & PLAN NOTE
In setting of bicuspid aortic valve, possible underlying stress could increase cardiac demand  Troponin: 23  EKG: Possible Q waves and inverted T waves in inferior leads   Echo showed aortic severe stenosis and cardiology following   Plan for cardiac cath today and and also CTS planing for AVR surgery tomorrow

## 2024-02-24 NOTE — PROGRESS NOTES
Monitor Summary:     Rhythm: SB/SR  Rate: 59-75  Ectopy: (R)PAC, (O)PVC  Measurements: .12/.09/.39                 12 Hour Chart Check

## 2024-02-24 NOTE — H&P
History & Physical Note    Date of Admission: 2/24/2024  Admission Status: Inpatient  [facility] Team: MARK  Attending: Johnny Canela M.D.   Senior Resident: Dr. Pierre  Contact Number: [phone]    Chief Complaint: chest pressure     History of Present Illness (HPI):   Umesh Chang is a 33 y.o. male who presented 2/23/2024 with acute chest pressure.  Patient reports that symptom onset was approximately 2 weeks ago and has been intermittent ever since then.  Patient denies any instigating factors and unsure what brings it upon.  Patient states that he can experience the same intensity of chest pressure whether he has walking around or at rest and there is no definite improvement with rest.  Patient denies any personal history of coronary artery disease but does report that he may have a questionable bicuspid aortic valve.  Patient also reports a family history of valvular issues.    In the ED, temperature 97.2, respiratory rate of 17, pulse of 68, blood pressure 115/73, 98% on room air.  Labs are significant for MCV of 71.5, LDL of 103, troponin of 23.  Chest x-ray did not show any acute processes.  EKG demonstrated questionable Q waves and T wave inversions in the inferior leads.  Patient wa received aspirin and initiated on heparin drip.    Review of Systems:   Review of Systems   Constitutional:  Negative for chills, fever and malaise/fatigue.   HENT:  Negative for congestion, hearing loss and sore throat.    Eyes:  Negative for blurred vision and double vision.   Respiratory:  Negative for cough, hemoptysis and shortness of breath.    Cardiovascular:  Positive for chest pain. Negative for palpitations and leg swelling.   Gastrointestinal:  Negative for abdominal pain, constipation, diarrhea, heartburn, nausea and vomiting.   Genitourinary:  Negative for dysuria and hematuria.   Musculoskeletal:  Negative for back pain, joint pain and neck pain.   Skin:  Negative for rash.   Neurological:  Negative for dizziness,  tingling, weakness and headaches.   Psychiatric/Behavioral:  The patient is not nervous/anxious and does not have insomnia.        Past Medical History:   Past Medical History was reviewed with patient.   has a past medical history of Benign heart murmur, Breath shortness (12/2020), History of COVID-19 (09/17/2021), Pneumonia (09/2020), Seizure disorder (HCC), and Snoring.    He has no past medical history of Clotting disorder (HCC).    Past Surgical History: Past Surgical History was reviewed with patient.   has a past surgical history that includes laryngoscopy (N/A, 11/20/2020); tonsillectomy; pr bronchoscopy,diagnostic (N/A, 12/31/2020); laryngoscopy (N/A, 12/31/2020); other; laryngoscopy (2/23/2021); pr laryngoscopy,dirct,op,biopsy (N/A, 12/14/2021); and pr bronchoscopy,trach/bronch dilatn (N/A, 12/14/2021).    Medications: Medications have been reviewed with patient.  Prior to Admission Medications   Prescriptions Last Dose Informant Patient Reported? Taking?   divalproex ER (DEPAKOTE ER) 500 MG TABLET SR 24 HR  Patient Yes No   Sig: Take 500 mg by mouth 2 (two) times a day.      Facility-Administered Medications: None        Allergies: Allergies have been reviewed with patient.  No Known Allergies    Family History:   family history includes Hypertension and heart valve issue in his mother.     Social History:   Tobacco: Denied   Alcohol: Drinks 1-2 beers/week   Recreational drugs (illegal and prescription):  Very remote history of marijuana use   Employment: Mail delivery   Activity Level: While on job, active   Primary Care Provider: reviewed Mohsen Tamasaby, M.D.    Physical Exam:   Vitals:  Temp:  [36.2 °C (97.2 °F)-36.9 °C (98.4 °F)] 36.9 °C (98.4 °F)  Pulse:  [66-77] 73  Resp:  [16-18] 16  BP: (115-151)/(72-81) 122/81  SpO2:  [93 %-98 %] 94 %    Physical Exam  Vitals and nursing note reviewed.   Constitutional:       Appearance: Normal appearance. He is not toxic-appearing.   HENT:      Head:  Normocephalic and atraumatic.      Right Ear: External ear normal.      Left Ear: External ear normal.      Nose: No rhinorrhea.      Mouth/Throat:      Mouth: Mucous membranes are moist.   Eyes:      Extraocular Movements: Extraocular movements intact.      Conjunctiva/sclera: Conjunctivae normal.   Cardiovascular:      Rate and Rhythm: Normal rate and regular rhythm.      Heart sounds: Murmur heard.      No gallop.      Comments: 4 out of 6 systolic murmur best heard around left sternal border  Pulmonary:      Breath sounds: Normal breath sounds. No wheezing or rales.   Abdominal:      General: Bowel sounds are normal. There is no distension.      Tenderness: There is no abdominal tenderness.   Musculoskeletal:      Cervical back: Neck supple.      Right lower leg: No edema.      Left lower leg: No edema.   Skin:     Capillary Refill: Capillary refill takes less than 2 seconds.      Findings: No rash.   Neurological:      Mental Status: He is oriented to person, place, and time.   Psychiatric:         Mood and Affect: Mood normal.         Behavior: Behavior normal.         Thought Content: Thought content normal.         Labs:   Recent Labs     02/23/24 2018   SODIUM 139   POTASSIUM 3.7   CHLORIDE 101   CO2 25   GLUCOSE 89   BUN 12     Recent Labs     02/23/24 2018   ALBUMIN 4.9   TBILIRUBIN 0.4   ALKPHOSPHAT 60   TOTPROTEIN 7.9   ALTSGPT 27   ASTSGOT 21   CREATININE 0.66         Recent Labs     02/23/24 2018   WBC 7.7   RBC 6.42*   HEMOGLOBIN 15.2   HEMATOCRIT 45.9   MCV 71.5*   MCH 23.7*   RDW 35.0*   PLATELETCT 260   MPV 11.1   NEUTSPOLYS 55.00   LYMPHOCYTES 36.40   MONOCYTES 7.20   EOSINOPHILS 0.40   BASOPHILS 0.70       EKG: Normal rate and rhythm however possible Q waves and inverted T waves in inferior leads    Imaging:   DX-CHEST-PORTABLE (1 VIEW)   Final Result      Cardiomegaly.      EC-ECHOCARDIOGRAM COMPLETE W/O CONT    (Results Pending)         Previous Data Review: reviewed    Problem  Representation: Umesh Chang is a 33-year-old male that is admitted in setting of NSTEMI will likely benefit with a 2 midnight stay for further evaluation and management.    * NSTEMI (non-ST elevated myocardial infarction) (HCC)- (present on admission)  Assessment & Plan  In setting of bicuspid aortic valve, possible underlying stress could increase cardiac demand  Troponin: 23  EKG: Possible Q waves and inverted T waves in inferior leads  Continue trending troponin and EKG  Received aspirin 325 in the ED, aspirin 81 mg daily ordered  Continue home atenolol  Start atorvastatin 40mg   Started on heparin drip in the ED, continue for now until repeat EKG and troponin  Echocardiogram ordered    HTN (hypertension)  Assessment & Plan  Continue home amlodipine and atenolol    Bicuspid aortic valve- (present on admission)  Assessment & Plan  Per echocardiogram in 2011, bicuspid aortic valve  Holosystolic murmur appreciated  Repeat echocardiogram pending    Obesity (BMI 30-39.9)  Assessment & Plan  BMI of 31.9  Further counseling on weight loss on discharge    Seizure disorder (HCC)- (present on admission)  Assessment & Plan  Per patient, has not had a recent breakthrough seizure  Continue home Depakote        Code: Full  Diet: Cardiac --> NPO midnight  DVT Px: Heparin gtt  Dispo: Admit to telemetry

## 2024-02-24 NOTE — PROGRESS NOTES
Patient transported from ED. Patient A&Ox4. Plan of care discussed with patient. Patient oriented to floor and surroundings. Patient currently on room air. Patient denies chest pain and sob at this time. Tele box placed. Monitor room notified. 2 rn skin check performed. Patient educated to call for assistance before ambulating. Patient education regarding fall risk. Call light within reach. Fall precautions in place.

## 2024-02-24 NOTE — PROGRESS NOTES
Hospital Medicine Daily Progress Note    Date of Service  2/24/2024    Chief Complaint  Umesh Chang is a 33 y.o. male admitted 2/23/2024 with chest pain     Hospital Course  This is a 33 y.o. male who presented 2/23/2024 with acute chest pressure.  Patient reports that symptom onset was approximately 2 weeks ago and has been intermittent ever since then.  Patient denies any instigating factors and unsure what brings it upon.  Patient states that he can experience the same intensity of chest pressure whether he has walking around or at rest and there is no definite improvement with rest.  Patient denies any personal history of coronary artery disease but does report that he may have a questionable bicuspid aortic valve.  Patient also reports a family history of valvular issues.   Patient admitted for further work up    Interval Problem Update  Patient seen and examined no chest pain this AM. Denies any naue or vomitng  Echo and stress test pending   Close monitoring on tele      I have discussed this patient's plan of care and discharge plan at IDT rounds today with Case Management, Nursing, Nursing leadership, and other members of the IDT team.    Consultants/Specialty  None     Code Status  Full Code    Disposition  The patient is not medically cleared for discharge to home or a post-acute facility.      I have placed the appropriate orders for post-discharge needs.    Review of Systems  Review of Systems   Constitutional:  Negative for chills, fever and malaise/fatigue.   HENT:  Negative for congestion, hearing loss and sore throat.    Eyes:  Negative for blurred vision and double vision.   Respiratory:  Negative for cough, hemoptysis and shortness of breath.    Cardiovascular:  Positive for chest pain. Negative for palpitations and leg swelling.   Gastrointestinal:  Negative for abdominal pain, constipation, diarrhea, heartburn, nausea and vomiting.   Genitourinary:  Negative for dysuria and hematuria.    Musculoskeletal:  Negative for back pain, joint pain and neck pain.   Skin:  Negative for rash.   Neurological:  Negative for dizziness, tingling, weakness and headaches.   Psychiatric/Behavioral:  The patient is not nervous/anxious and does not have insomnia.         Physical Exam  Temp:  [36.2 °C (97.2 °F)-37 °C (98.6 °F)] 37 °C (98.6 °F)  Pulse:  [60-77] 60  Resp:  [16-20] 20  BP: (113-151)/(64-81) 119/73  SpO2:  [92 %-98 %] 93 %    Physical Exam  Vitals and nursing note reviewed.   Constitutional:       Appearance: Normal appearance. He is not toxic-appearing.   HENT:      Head: Normocephalic and atraumatic.      Right Ear: External ear normal.      Left Ear: External ear normal.      Nose: No rhinorrhea.      Mouth/Throat:      Mouth: Mucous membranes are moist.   Eyes:      Extraocular Movements: Extraocular movements intact.      Conjunctiva/sclera: Conjunctivae normal.   Cardiovascular:      Rate and Rhythm: Normal rate and regular rhythm.      Heart sounds: Murmur heard.      No gallop.      Comments: 4 out of 6 systolic murmur best heard around left sternal border  Pulmonary:      Breath sounds: Normal breath sounds. No wheezing or rales.   Abdominal:      General: Bowel sounds are normal. There is no distension.      Tenderness: There is no abdominal tenderness.   Musculoskeletal:      Cervical back: Neck supple.      Right lower leg: No edema.      Left lower leg: No edema.   Skin:     Capillary Refill: Capillary refill takes less than 2 seconds.      Findings: No rash.   Neurological:      Mental Status: He is oriented to person, place, and time.   Psychiatric:         Mood and Affect: Mood normal.         Behavior: Behavior normal.         Thought Content: Thought content normal.         Fluids    Intake/Output Summary (Last 24 hours) at 2/24/2024 1426  Last data filed at 2/24/2024 0900  Gross per 24 hour   Intake --   Output 1000 ml   Net -1000 ml       Laboratory  Recent Labs     02/23/24 2018  02/24/24  0032   WBC 7.7 8.3   RBC 6.42* 5.77   HEMOGLOBIN 15.2 13.6*   HEMATOCRIT 45.9 41.1*   MCV 71.5* 71.2*   MCH 23.7* 23.6*   MCHC 33.1 33.1   RDW 35.0* 34.6*   PLATELETCT 260 244   MPV 11.1 11.1     Recent Labs     02/23/24 2018 02/24/24  0032   SODIUM 139 141   POTASSIUM 3.7 3.7   CHLORIDE 101 106   CO2 25 23   GLUCOSE 89 132*   BUN 12 11   CREATININE 0.66 0.59   CALCIUM 9.4 8.8     Recent Labs     02/23/24 2018   APTT 30.4   INR 0.94         Recent Labs     02/23/24 2018   TRIGLYCERIDE 98   HDL 50   *       Imaging  EC-ECHOCARDIOGRAM COMPLETE W/ CONT         DX-CHEST-PORTABLE (1 VIEW)   Final Result      Cardiomegaly.      NM-CARDIAC STRESS TEST    (Results Pending)        Assessment/Plan  * NSTEMI (non-ST elevated myocardial infarction) (HCC)- (present on admission)  Assessment & Plan  In setting of bicuspid aortic valve, possible underlying stress could increase cardiac demand  Troponin: 23  EKG: Possible Q waves and inverted T waves in inferior leads   Echo and stress test pending   Monitor on tele       HTN (hypertension)  Assessment & Plan  Continue home amlodipine and atenolol    Obesity (BMI 30-39.9)  Assessment & Plan  BMI of 31.9  Further counseling on weight loss on discharge    Bicuspid aortic valve- (present on admission)  Assessment & Plan  Per echocardiogram in 2011, bicuspid aortic valve  Holosystolic murmur appreciated  Repeat echocardiogram pending    Seizure disorder (HCC)- (present on admission)  Assessment & Plan  Per patient, has not had a recent breakthrough seizure  Continue home Depakote         VTE prophylaxis: Lovenox  ppx     I have performed a physical exam and reviewed and updated ROS and Plan today (2/24/2024). In review of yesterday's note (2/23/2024), there are no changes except as documented above.

## 2024-02-24 NOTE — ASSESSMENT & PLAN NOTE
Per echocardiogram in 2011, bicuspid aortic valve  Holosystolic murmur appreciated  Repeat echocardiogram pending

## 2024-02-24 NOTE — ED NOTES
Med rec complete per patient  Allergies reviewed.   Outpatient antibiotics in the last 30 days? No   Anticoagulants taken in the last 14 days? No    Pharmacy patient utilizes: [facility] and Karen [facility]immer [facility]

## 2024-02-24 NOTE — PROGRESS NOTES
4 Eyes Skin Assessment Completed by Kristi RN and Roger RN.    Head WDL  Ears WDL  Nose WDL  Mouth WDL  Neck WDL  Breast/Chest WDL  Shoulder Blades WDL  Spine WDL  (R) Arm/Elbow/Hand WDL  (L) Arm/Elbow/Hand WDL  Abdomen WDL  Groin WDL  Scrotum/Coccyx/Buttocks WDL  (R) Leg WDL  (L) Leg WDL  (R) Heel/Foot/Toe WDL  (L) Heel/Foot/Toe WDL          Devices In Places Tele Box, Blood Pressure Cuff, and Pulse Ox      Interventions In Place Pillows    Possible Skin Injury No    Pictures Uploaded Into Epic N/A  Wound Consult Placed N/A  RN Wound Prevention Protocol Ordered No

## 2024-02-24 NOTE — ED NOTES
Assist RN: Heparin gtt verified with T7 LARA Dickens prior to transport. Pt transported to T701 on zoll monitor.

## 2024-02-24 NOTE — ED PROVIDER NOTES
ED Provider Note    CHIEF COMPLAINT  Chief Complaint   Patient presents with    Chest Pain     X 2 weeks, pt reports 10/10 central chest pressure. +SOB intermittently, -N/V, -dizziness  Denies cardiac hx.        EXTERNAL RECORDS REVIEWED  Echocardiogram performed 11/8/2011: Bicuspid aortic valve.  Moderate aortic insufficiency.  LV ejection fraction 60 to 65%.  Mildly elevated estimated RV systolic pressure.    HPI/ROS  LIMITATION TO HISTORY   Select: : None  OUTSIDE HISTORIAN(S):  none    Umesh Chang is a 33 y.o. male presenting to the emergency department for chest pain.  Patient says that he has been having persistent substernal chest pressure which seems to be progressive over the last several months.  Says that chest pain initially started with exertion, now he notices it at rest.  Describes substernal chest pain without radiation.  No associated jaw, neck, shoulder pain.  No nausea or vomiting.  No shortness of breath.    Says that he was diagnosed with a murmur at a young age, saw cardiologist as a child and was told that he might have to see a cardiologist when he is older.     Says that he was diagnosed with hypertension, takes atenolol, amlodipine.  Denies tobacco, drug use.  + Social alcohol use.    PAST MEDICAL HISTORY   has a past medical history of Benign heart murmur, Breath shortness (12/2020), History of COVID-19 (09/17/2021), Pneumonia (09/2020), Seizure disorder (HCC), and Snoring.    SURGICAL HISTORY   has a past surgical history that includes laryngoscopy (N/A, 11/20/2020); tonsillectomy; bronchoscopy,diagnostic (N/A, 12/31/2020); laryngoscopy (N/A, 12/31/2020); other; laryngoscopy (2/23/2021); laryngoscopy,dirct,op,biopsy (N/A, 12/14/2021); and bronchoscopy,trach/bronch dilatn (N/A, 12/14/2021).    FAMILY HISTORY  Family History   Problem Relation Age of Onset    Hypertension Mother        SOCIAL HISTORY  Social History     Tobacco Use    Smoking status: Never    Smokeless tobacco: Never  "  Vaping Use    Vaping Use: Never used   Substance and Sexual Activity    Alcohol use: Yes     Comment: one beer every 3 weeks or so    Drug use: No    Sexual activity: Not Currently       CURRENT MEDICATIONS  Home Medications       Reviewed by Neena Lugo R.N. (Registered Nurse) on 24 at 1824  Med List Status: Not Addressed     Medication Last Dose Status   divalproex ER (DEPAKOTE ER) 500 MG TABLET SR 24 HR  Active                    ALLERGIES  No Known Allergies    PHYSICAL EXAM  VITAL SIGNS: /73   Pulse 68   Temp 36.2 °C (97.2 °F) (Temporal)   Resp 17   Ht 1.803 m (5' 11\")   Wt 104 kg (230 lb)   SpO2 98%   BMI 32.08 kg/m²    General: Well- appearing , non-toxic, no acute distress  Neuro: oriented x 3, moving all extremities.   HEENT:   - Head: Normocephalic, atraumatic  - Eyes: PERRL  - Ears/Nose: normal external nose and ears  - Mouth: moist mucosal membranes  Neck: No JVD  Resp: clear to auscultation, no increased work of breathing  CV: Regular rate and rhythm, systolic ejection murmur noted.  Abd: Soft, non-tender, non-distended  Extremities: No peripheral edema  Psych: lucid and conversational         DIAGNOSTIC STUDIES / PROCEDURES    EKG  My independent EKG interpretation:  Results for orders placed or performed during the hospital encounter of 24   EKG   Result Value Ref Range    Report       [facility] Emergency Dept.    Test Date:  2024  Pt Name:    TARA ORTIZ                   Department: ER  MRN:        [id number]                      Room:  Gender:     Male                         Technician: [id number]  :        1990                   Requested By:ER TRIAGE PROTOCOL  Order #:    [id number]                    Alize DELCID: Chema Staley    Measurements  Intervals                                Axis  Rate:       70                           P:          55  IN:         116                          QRS:        -39  QRSD:       110                      "     T:          -14  QT:         395  QTc:        427    Interpretive Statements  Sinus rhythm  Borderline short MD interval  Left ventricular hypertrophy  Q waves inferior leads  T wave inversion inferior leads    Electronically Signed On 02- 19:49:27 [facility] by Chema Staley         LABS  Results for orders placed or performed during the hospital encounter of 02/23/24   CBC WITH DIFFERENTIAL   Result Value Ref Range    WBC 7.7 4.8 - 10.8 K/uL    RBC 6.42 (H) 4.70 - 6.10 M/uL    Hemoglobin 15.2 14.0 - 18.0 g/dL    Hematocrit 45.9 42.0 - 52.0 %    MCV 71.5 (L) 81.4 - 97.8 fL    MCH 23.7 (L) 27.0 - 33.0 pg    MCHC 33.1 32.3 - 36.5 g/dL    RDW 35.0 (L) 35.9 - 50.0 fL    Platelet Count 260 164 - 446 K/uL    MPV 11.1 9.0 - 12.9 fL    Neutrophils-Polys 55.00 44.00 - 72.00 %    Lymphocytes 36.40 22.00 - 41.00 %    Monocytes 7.20 0.00 - 13.40 %    Eosinophils 0.40 0.00 - 6.90 %    Basophils 0.70 0.00 - 1.80 %    Immature Granulocytes 0.30 0.00 - 0.90 %    Nucleated RBC 0.00 0.00 - 0.20 /100 WBC    Neutrophils (Absolute) 4.22 1.82 - 7.42 K/uL    Lymphs (Absolute) 2.79 1.00 - 4.80 K/uL    Monos (Absolute) 0.55 0.00 - 0.85 K/uL    Eos (Absolute) 0.03 0.00 - 0.51 K/uL    Baso (Absolute) 0.05 0.00 - 0.12 K/uL    Immature Granulocytes (abs) 0.02 0.00 - 0.11 K/uL    NRBC (Absolute) 0.00 K/uL   COMP METABOLIC PANEL   Result Value Ref Range    Sodium 139 135 - 145 mmol/L    Potassium 3.7 3.6 - 5.5 mmol/L    Chloride 101 96 - 112 mmol/L    Co2 25 20 - 33 mmol/L    Anion Gap 13.0 7.0 - 16.0    Glucose 89 65 - 99 mg/dL    Bun 12 8 - 22 mg/dL    Creatinine 0.66 0.50 - 1.40 mg/dL    Calcium 9.4 8.5 - 10.5 mg/dL    Correct Calcium 8.7 8.5 - 10.5 mg/dL    AST(SGOT) 21 12 - 45 U/L    ALT(SGPT) 27 2 - 50 U/L    Alkaline Phosphatase 60 30 - 99 U/L    Total Bilirubin 0.4 0.1 - 1.5 mg/dL    Albumin 4.9 3.2 - 4.9 g/dL    Total Protein 7.9 6.0 - 8.2 g/dL    Globulin 3.0 1.9 - 3.5 g/dL    A-G Ratio 1.6 g/dL   TROPONIN   Result Value Ref Range     Troponin T 23 (H) 6 - 19 ng/L   proBrain Natriuretic Peptide, NT   Result Value Ref Range    NT-proBNP 60 0 - 125 pg/mL   ESTIMATED GFR   Result Value Ref Range    GFR (CKD-EPI) 126 >60 mL/min/1.73 m 2   EKG   Result Value Ref Range    Report       [facility] Emergency Dept.    Test Date:  2024  Pt Name:    UMESH ORTIZ                   Department: ER  MRN:        [id number]                      Room:  Gender:     Male                         Technician: [id number]  :        1990                   Requested By:ER TRIAGE PROTOCOL  Order #:    [id number]                    Reading MD: Chema Staley    Measurements  Intervals                                Axis  Rate:       70                           P:          55  OR:         116                          QRS:        -39  QRSD:       110                          T:          -14  QT:         395  QTc:        427    Interpretive Statements  Sinus rhythm  Borderline short OR interval  Left ventricular hypertrophy  Q waves inferior leads  T wave inversion inferior leads    Electronically Signed On 2024 19:49:27 PST by Chema Staley         RADIOLOGY  I have independently interpreted the diagnostic imaging associated with this visit and am waiting the final reading from the radiologist.   My preliminary interpretation is as follows:   -   Radiologist interpretation:   DX-CHEST-PORTABLE (1 VIEW)   Final Result      Cardiomegaly.              MEDICAL DECISION MAKING    ED Observation Status? No; Patient does not meet criteria for ED Observation.     ED COURSE AND PLAN    Umesh Ortiz is a 33 y.o. male with past medical history of bicuspid aortic valve presenting to the emergency department for progressive chest pain.  Seems to have been occurring with exertion over the last several months but now is occurring at rest.  In the emergency department now, patient has minimal chest pain.  His EKG shows new T wave inversions in the inferior  precordial leads as well as left ventricular hypertrophy.  There is no evidence of ST elevation or depression.    Bedside echocardiogram concerning for progression in aortic stenosis.  He is hemodynamically stable in the emergency department.  Troponin is elevated at 23.    Will plan to initiate aspirin, heparin, admit to the hospital for echocardiogram and further diagnostic workup.      ---Pertinent ED Course---:    9:17 PM I reviewed the patient's old records in Epic, medication list, allergies, past medical history and performed a physical examination.           Procedures:    Point of Care Ultrasound    ED POINT OF CARE ULTRASOUND: LIMITED CARDIAC    Indication for exam: Chest pain, EKG changes  LVEF: normal  Pericardial Effusion:not present  RV Strain: Mild RV dilation  Pulmonary B Lines:not present    Image retained through [employer] as seen below:       Additional interpretation: Aortic valve stenosis.    This study is a limited ultrasound examination performed and interpreted to evaluate for limited conditions as outlined above. There may be other clinically important information contained in the images that is outside this scope. When clinically warranted, a comprehensive ultrasound through the appropriate department is considered.    ----------------------------------------------------------------------------------  DISCUSSIONS    I have discussed management of the patient with the following physicians and JUWAN's:      Discussion of management with other [facility] or appropriate source(s):     Escalation of care considered, and ultimately not performed: There but no indication for cardiology consultation at this time.    Barriers to care at this time, including but not limited to:     Decision tools and prescription drugs considered including, but not limited to:     FINAL IMPRESSION    1. NSTEMI (non-ST elevated myocardial infarction) (HCC)        New Prescriptions    No medications on file          DISPOSITION        Admission: The patient will be admitted for further evaluation and treatment. Discussed case with consultants and relayed all necessary information.        This chart was dictated using an electronic voice recognition software. The chart has been reviewed and edited but there is still possibility for dictation errors due to limitation of software.    Chema Staley, DO 2/23/2024

## 2024-02-25 LAB
ANION GAP SERPL CALC-SCNC: 13 MMOL/L (ref 7–16)
BUN SERPL-MCNC: 14 MG/DL (ref 8–22)
CALCIUM SERPL-MCNC: 9 MG/DL (ref 8.5–10.5)
CHLORIDE SERPL-SCNC: 103 MMOL/L (ref 96–112)
CO2 SERPL-SCNC: 22 MMOL/L (ref 20–33)
CREAT SERPL-MCNC: 0.56 MG/DL (ref 0.5–1.4)
ERYTHROCYTE [DISTWIDTH] IN BLOOD BY AUTOMATED COUNT: 34.4 FL (ref 35.9–50)
GFR SERPLBLD CREATININE-BSD FMLA CKD-EPI: 133 ML/MIN/1.73 M 2
GLUCOSE SERPL-MCNC: 92 MG/DL (ref 65–99)
HCT VFR BLD AUTO: 44.9 % (ref 42–52)
HGB BLD-MCNC: 14.6 G/DL (ref 14–18)
MCH RBC QN AUTO: 23.2 PG (ref 27–33)
MCHC RBC AUTO-ENTMCNC: 32.5 G/DL (ref 32.3–36.5)
MCV RBC AUTO: 71.4 FL (ref 81.4–97.8)
PLATELET # BLD AUTO: 264 K/UL (ref 164–446)
PMV BLD AUTO: 10.9 FL (ref 9–12.9)
POTASSIUM SERPL-SCNC: 3.7 MMOL/L (ref 3.6–5.5)
RBC # BLD AUTO: 6.29 M/UL (ref 4.7–6.1)
SODIUM SERPL-SCNC: 138 MMOL/L (ref 135–145)
WBC # BLD AUTO: 9.2 K/UL (ref 4.8–10.8)

## 2024-02-25 PROCEDURE — 700111 HCHG RX REV CODE 636 W/ 250 OVERRIDE (IP): Mod: JZ

## 2024-02-25 PROCEDURE — 700102 HCHG RX REV CODE 250 W/ 637 OVERRIDE(OP)

## 2024-02-25 PROCEDURE — 700105 HCHG RX REV CODE 258

## 2024-02-25 PROCEDURE — 36415 COLL VENOUS BLD VENIPUNCTURE: CPT

## 2024-02-25 PROCEDURE — 93005 ELECTROCARDIOGRAM TRACING: CPT | Performed by: INTERNAL MEDICINE

## 2024-02-25 PROCEDURE — 85027 COMPLETE CBC AUTOMATED: CPT

## 2024-02-25 PROCEDURE — 80048 BASIC METABOLIC PNL TOTAL CA: CPT

## 2024-02-25 PROCEDURE — 99232 SBSQ HOSP IP/OBS MODERATE 35: CPT | Mod: FS | Performed by: INTERNAL MEDICINE

## 2024-02-25 PROCEDURE — 99233 SBSQ HOSP IP/OBS HIGH 50: CPT | Performed by: INTERNAL MEDICINE

## 2024-02-25 PROCEDURE — A9270 NON-COVERED ITEM OR SERVICE: HCPCS

## 2024-02-25 PROCEDURE — 770020 HCHG ROOM/CARE - TELE (206)

## 2024-02-25 PROCEDURE — 700101 HCHG RX REV CODE 250

## 2024-02-25 RX ORDER — SODIUM CHLORIDE 9 MG/ML
INJECTION, SOLUTION INTRAVENOUS CONTINUOUS
Status: DISCONTINUED | OUTPATIENT
Start: 2024-02-26 | End: 2024-02-26

## 2024-02-25 RX ORDER — MAGNESIUM SULFATE HEPTAHYDRATE 40 MG/ML
2 INJECTION, SOLUTION INTRAVENOUS ONCE
Status: COMPLETED | OUTPATIENT
Start: 2024-02-25 | End: 2024-02-25

## 2024-02-25 RX ADMIN — AMLODIPINE BESYLATE 10 MG: 10 TABLET ORAL at 05:08

## 2024-02-25 RX ADMIN — ASPIRIN 81 MG: 81 TABLET, COATED ORAL at 05:08

## 2024-02-25 RX ADMIN — DIVALPROEX SODIUM 500 MG: 500 TABLET, EXTENDED RELEASE ORAL at 21:09

## 2024-02-25 RX ADMIN — MAGNESIUM SULFATE HEPTAHYDRATE 2 G: 2 INJECTION, SOLUTION INTRAVENOUS at 21:06

## 2024-02-25 RX ADMIN — ATORVASTATIN CALCIUM 40 MG: 40 TABLET, FILM COATED ORAL at 05:08

## 2024-02-25 RX ADMIN — DIVALPROEX SODIUM 500 MG: 500 TABLET, EXTENDED RELEASE ORAL at 08:36

## 2024-02-25 RX ADMIN — POTASSIUM PHOSPHATE, MONOBASIC AND POTASSIUM PHOSPHATE, DIBASIC 15 MMOL: 224; 236 INJECTION, SOLUTION, CONCENTRATE INTRAVENOUS at 21:03

## 2024-02-25 RX ADMIN — ATENOLOL 50 MG: 50 TABLET ORAL at 05:08

## 2024-02-25 ASSESSMENT — ENCOUNTER SYMPTOMS
FEVER: 0
NAUSEA: 0
HEARTBURN: 0
HEMOPTYSIS: 0
DIARRHEA: 0
DIZZINESS: 0
STRIDOR: 0
VOMITING: 0
CONSTIPATION: 0
NERVOUS/ANXIOUS: 0
PALPITATIONS: 0
COUGH: 0
BLURRED VISION: 0
WHEEZING: 0
NECK PAIN: 0
TINGLING: 0
APNEA: 0
INSOMNIA: 0
ABDOMINAL PAIN: 0
CHOKING: 0
HEADACHES: 0
SHORTNESS OF BREATH: 0
CHEST TIGHTNESS: 0
DOUBLE VISION: 0
CHILLS: 0
SORE THROAT: 0
BACK PAIN: 0
WEAKNESS: 0

## 2024-02-25 ASSESSMENT — PATIENT HEALTH QUESTIONNAIRE - PHQ9
2. FEELING DOWN, DEPRESSED, IRRITABLE, OR HOPELESS: NOT AT ALL
SUM OF ALL RESPONSES TO PHQ9 QUESTIONS 1 AND 2: 0
1. LITTLE INTEREST OR PLEASURE IN DOING THINGS: NOT AT ALL

## 2024-02-25 ASSESSMENT — FIBROSIS 4 INDEX: FIB4 SCORE: 0.51

## 2024-02-25 ASSESSMENT — PAIN DESCRIPTION - PAIN TYPE: TYPE: ACUTE PAIN

## 2024-02-25 NOTE — CONSULTS
REFERRING PHYSICIAN: Carmen Lombardo MD.     CONSULTING PHYSICIAN: Kevin David DO     CHIEF COMPLAINT: Chest pain    HISTORY OF PRESENT ILLNESS: The patient is a 33 y.o. male with past medical history of seizure disorder, hypertension, diabetes, heart murmur and bicuspid aortic valve who presents with chest pain. He states he has had it intermittently for the last two weeks. He reports it is moderate in intensity and no exertional, but aggravated by stress. He denies shortness of breath, orthopnea, PND, dizziness, and syncope. He works as a mailman.     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     Resolved Ambulatory Problems     Diagnosis Date Noted    Benign heart murmur      Past Medical History:   Diagnosis Date    Breath shortness 12/2020    History of COVID-19 09/17/2021    Pneumonia 09/2020    Snoring        PAST SURGICAL HISTORY:   Past Surgical History:   Procedure Laterality Date    CO LARYNGOSCOPY,DIRCT,OP,BIOPSY N/A 12/14/2021    Procedure: LARYNGOSCOPY,DIRECT,WITH BIOPSY IF INDICATED;  Surgeon: Johnny Martines M.D.;  Location: SURGERY SAME DAY [facility];  Service: Ent    CO BRONCHOSCOPY,TRACH/BRONCH DILATN N/A 12/14/2021    Procedure: TRACHEAL DILATION,TRACHEOPLASTY,USING BALLOON;  Surgeon: Johnny Martines M.D.;  Location: SURGERY SAME DAY [facility];  Service: Ent    LARYNGOSCOPY  2/23/2021    Procedure: LARYNGOSCOPY - DIRECT W/TRACHEAL DILATION;  Surgeon: Johnny Martines M.D.;  Location: SURGERY SAME DAY [facility];  Service: Ent    CO BRONCHOSCOPY,DIAGNOSTIC N/A 12/31/2020    Procedure: BRONCHOSCOPY - WITH DILATION;  Surgeon: Johnny Martines M.D.;  Location: SURGERY SAME DAY [facility];  Service: Ent    LARYNGOSCOPY N/A 12/31/2020    Procedure: LARYNGOSCOPY - DIRECT;  Surgeon: Johnny Martines  M.D.;  Location: SURGERY SAME DAY [facility];  Service: Ent    LARYNGOSCOPY N/A 11/20/2020    Procedure: LARYNGOSCOPY - DIRECT W/BALLOON DILATION;  Surgeon: Johnny Martines M.D.;  Location: SURGERY SAME DAY [facility];  Service: Ent    OTHER      tubes in ears     TONSILLECTOMY          ALLERGIES: No Known Allergies     CURRENT MEDICATIONS:   Current Facility-Administered Medications:     atorvastatin (Lipitor) tablet 40 mg, 40 mg, Oral, DAILY, Josemanuel Pierre M.D., 40 mg at 02/25/24 0508    aspirin EC tablet 81 mg, 81 mg, Oral, DAILY, Josemanuel Pierre M.D., 81 mg at 02/25/24 0508    enoxaparin (Lovenox) inj 40 mg, 40 mg, Subcutaneous, DAILY AT 1800, Dimitri Gallo M.D., 40 mg at 02/24/24 1714    senna-docusate (Pericolace Or Senokot S) 8.6-50 MG per tablet 2 Tablet, 2 Tablet, Oral, Q EVENING, 2 Tablet at 02/23/24 2211 **AND** polyethylene glycol/lytes (Miralax) Packet 1 Packet, 1 Packet, Oral, QDAY PRN, Josemanuel Pierre M.D.    divalproex ER (Depakote ER) tablet 500 mg, 500 mg, Oral, BID, Josemanuel Pierre M.D., 500 mg at 02/25/24 0836    atenolol (Tenormin) tablet 50 mg, 50 mg, Oral, DAILY, Josemanuel Pierre M.D., 50 mg at 02/25/24 0508    amLODIPine (Norvasc) tablet 10 mg, 10 mg, Oral, DAILY, Josemanuel Pierre M.D., 10 mg at 02/25/24 0508    FAMILY HISTORY:   Family History   Problem Relation Age of Onset    Hypertension Mother         SOCIAL HISTORY:   Social History     Socioeconomic History    Marital status: Single     Spouse name: Not on file    Number of children: Not on file    Years of education: Not on file    Highest education level: Not on file   Occupational History    Not on file   Tobacco Use    Smoking status: Never    Smokeless tobacco: Never   Vaping Use    Vaping Use: Never used   Substance and Sexual Activity    Alcohol use: Yes     Comment: one beer every 3 weeks or so    Drug use: No    Sexual activity: Not Currently   Other Topics Concern    Not on file   Social History Narrative    Not on file     Social  "Determinants of Health     Financial Resource Strain: Not on file   Food Insecurity: Not on file   Transportation Needs: Not on file   Physical Activity: Not on file   Stress: Not on file   Social Connections: Not on file   Intimate Partner Violence: Not on file   Housing Stability: Not on file       REVIEW OF SYSTEMS:  Review of Systems   Constitutional:  Negative for chills, diaphoresis, fever and weight loss.   HENT:  Negative for congestion, ear pain, hearing loss, nosebleeds, sore throat and tinnitus.    Eyes:  Negative for blurred vision, double vision, pain and discharge.   Respiratory:  Negative for cough, hemoptysis, sputum production, wheezing and stridor.    Cardiovascular:  Positive for chest pain. Negative for palpitations, orthopnea and leg swelling.   Gastrointestinal:  Negative for abdominal pain, constipation, heartburn, melena, nausea and vomiting.   Genitourinary:  Negative for dysuria, flank pain and hematuria.   Musculoskeletal:  Positive for joint pain. Negative for myalgias and neck pain.   Skin:  Negative for itching and rash.   Neurological:  Negative for dizziness, speech change, focal weakness, seizures, weakness and headaches.   Endo/Heme/Allergies:  Negative for environmental allergies and polydipsia. Does not bruise/bleed easily.   Psychiatric/Behavioral:  Negative for depression, hallucinations, substance abuse and suicidal ideas.          PHYSICAL EXAMINATION:    /70   Pulse 72   Temp 36.7 °C (98.1 °F) (Temporal)   Resp 18   Ht 1.803 m (5' 11\")   Wt 101 kg (222 lb 10.6 oz)   SpO2 94%   BMI 31.06 kg/m²    Physical Exam  Vitals and nursing note reviewed.   Constitutional:       General: He is not in acute distress.  HENT:      Head: Normocephalic and atraumatic.      Nose: Nose normal.   Eyes:      Conjunctiva/sclera: Conjunctivae normal.      Pupils: Pupils are equal, round, and reactive to light.   Neck:      Vascular: No JVD.      Trachea: No tracheal deviation. "   Cardiovascular:      Rate and Rhythm: Normal rate and regular rhythm.      Heart sounds: Murmur heard.   Pulmonary:      Effort: Pulmonary effort is normal. No respiratory distress.      Breath sounds: Normal breath sounds. No stridor.   Abdominal:      General: Bowel sounds are normal. There is no distension.      Palpations: Abdomen is soft.      Tenderness: There is no abdominal tenderness.   Musculoskeletal:         General: No tenderness. Normal range of motion.      Cervical back: Normal range of motion and neck supple.   Skin:     General: Skin is warm and dry.   Neurological:      Mental Status: He is alert and oriented to person, place, and time.      Coordination: Coordination normal.      Gait: Gait is intact.   Psychiatric:         Mood and Affect: Mood and affect normal.         Cognition and Memory: Memory normal.           LABS REVIEWED:  Lab Results   Component Value Date/Time    SODIUM 138 02/25/2024 12:09 AM    POTASSIUM 3.7 02/25/2024 12:09 AM    CHLORIDE 103 02/25/2024 12:09 AM    CO2 22 02/25/2024 12:09 AM    GLUCOSE 92 02/25/2024 12:09 AM    BUN 14 02/25/2024 12:09 AM    CREATININE 0.56 02/25/2024 12:09 AM    CREATININE 0.9 10/12/2006 08:58 AM      Lab Results   Component Value Date/Time    PROTHROMBTM 12.7 02/23/2024 08:18 PM    INR 0.94 02/23/2024 08:18 PM      Lab Results   Component Value Date/Time    WBC 9.2 02/25/2024 12:09 AM    RBC 6.29 (H) 02/25/2024 12:09 AM    HEMOGLOBIN 14.6 02/25/2024 12:09 AM    HEMATOCRIT 44.9 02/25/2024 12:09 AM    MCV 71.4 (L) 02/25/2024 12:09 AM    MCH 23.2 (L) 02/25/2024 12:09 AM    MCHC 32.5 02/25/2024 12:09 AM    MPV 10.9 02/25/2024 12:09 AM    NEUTSPOLYS 55.00 02/23/2024 08:18 PM    LYMPHOCYTES 36.40 02/23/2024 08:18 PM    MONOCYTES 7.20 02/23/2024 08:18 PM    EOSINOPHILS 0.40 02/23/2024 08:18 PM    BASOPHILS 0.70 02/23/2024 08:18 PM    HYPOCHROMIA 1+ 05/10/2011 11:43 AM    ANISOCYTOSIS 1+ 10/12/2006 08:58 AM        IMAGING REVIEWED AND  INTERPRETED:    E[place]OCARDIOGRAM   [place] [facility] [place]IONS  Prior study on 11/8/11, compared to the report of the prior study,   there has been progression of his aortic stenosis.   Normal left ventricular systolic function.  The left ventricular ejection fraction is visually estimated to be 60%.  Calcified, likely bicuspid aortic valve.  Severe aortic valve stenosis.  Vmax is 4.25 m/s. Transvalvular gradients are - Peak: 72 mmHg, Mean: 39   mmHg.  Mild aortic insufficiency.  Mild tricuspid regurgitation.  Estimated right ventricular systolic pressure is 30 mmHg.    CARDIAC CATHETERIZATION pending    CT SCAN CHEST none      IMPRESSION:  Severe symptomatic aortic stenosis      PLAN:    I recommend aortic valve replacement with a mechanical valve. I discussed the pros and cons of the various valve types in detail.The patient would like to proceed with a mechanical valve.   The procedure, its risks, benefits, potential complications and alternative treatments were discussed with the patient in detail including the risks should he decide not to undergo my recommended treatment. All of his questions were answered to his satisfaction and he is willing to proceed with the operation. The risks include death, stroke, infection: to include a rare bacterial infection related to the use of the heart/lung machine, sean-operative myocardial infarction, dysrhythmias, diaphragmatic paralysis, chest wall paresthesia, tracheostomy, kidney or other organ failure, possible return to the operating room for bleeding, bleeding requiring transfusion with its attendant risks including AIDS or hepatitis, dehiscence of surgical incisions, respiratory complications including the need for prolonged ventilator support, Protamine or other drug reaction, peripheral neuropathy, loss of limb, and miscount of surgical items. The operative mortality risk is approximately 1%. The STS mortality risk score is 0.4% and the morbidity and mortality risk  score is 4.3%. The scores were discussed with patient.     Thank you for this very challenging consultation and participation in the patient’s care.  I will keep you apprised of all future developments.      The operation is scheduled for tomorrow     Sincerely,     DO MARIEL Claros Kimball Knackstedt, DO performed a substantial portion of the service face-to-face with the same patient on the same date of service. I have reviewed and agree with the care plan and complexity of problems documented by me, Kevin David DO and/or CHAPITO Betts. I was personally involved in the medical decision making, including the information below:  reviewing and interpreting the films, conducted elements of the history and physical exam, and provided the plan of care. All medical decision making was made by me.

## 2024-02-25 NOTE — CARE PLAN
"The patient is Stable - Low risk of patient condition declining or worsening    Shift Goals  Clinical Goals: Patient will verbalize understanding of POC. Patient will receive education on stress test. Patient will be NPO at midnight in preparation for stress test.  Patient Goals: \"Find out what the heart dotors think is best tomorrow.\"  Family Goals: SANTIAGO    Progress made toward(s) clinical / shift goals:  Provided patient with a verbal discussion related to POC to include a brief discussion and printed handout related to having a nuclear stress test / cardiac angiogram. It was explained to patient that his current treatment plan is fluid and will be determined fully by cardiology today; 2/25/2024. Patient has been kept NPO since midnight in preparation for cardiology's recommendations. Pt is able to verbalize pain on a scale of 1-10 and is able to verbalize comfort goal. Pain management plan followed and pt educated on nonpharmacologic and pharmacological comfort measures.     Patient is not progressing towards the following goals: N/A      "

## 2024-02-25 NOTE — PROGRESS NOTES
Cardiology Follow Up Progress Note    Date of Service  2/25/2024    Attending Physician  Dimitri Gallo M.D.    Chief Complaint     Acute intermittent chest pain, echo demonstrated severe aortic stenosis, 1 drop 23 trending down    HPI  Umesh Chang is a 33 y.o. male admitted 2/23/2024 with intermittent chest pain.  Found indeterminate elevation of troponin.    PMH: Hypertension, seizure disorder      Interim Events    No overnight cardiac events  Telemetry-SR  SBP appropriate  BMP in good order  Plan for [facility] 2/26/2024  Keep n.p.o.  Denies having recurrent chest pain.    Review of Systems  Review of Systems   Respiratory:  Negative for apnea, cough, choking, chest tightness, shortness of breath, wheezing and stridor.        Vital signs in last 24 hours  Temp:  [36.6 °C (97.9 °F)-37 °C (98.6 °F)] 37 °C (98.6 °F)  Pulse:  [62-76] 62  Resp:  [18-20] 18  BP: (112-139)/(67-79) 125/73  SpO2:  [92 %-95 %] 95 %    Physical Exam  Physical Exam  Cardiovascular:      Rate and Rhythm: Normal rate and regular rhythm.      Pulses: Normal pulses.      Heart sounds: Murmur heard.   Pulmonary:      Effort: Pulmonary effort is normal.   Skin:     General: Skin is warm.   Neurological:      Mental Status: He is alert. Mental status is at baseline.   Psychiatric:         Mood and Affect: Mood normal.         Lab Review  Lab Results   Component Value Date/Time    WBC 9.2 02/25/2024 12:09 AM    RBC 6.29 (H) 02/25/2024 12:09 AM    HEMOGLOBIN 14.6 02/25/2024 12:09 AM    HEMATOCRIT 44.9 02/25/2024 12:09 AM    MCV 71.4 (L) 02/25/2024 12:09 AM    MCH 23.2 (L) 02/25/2024 12:09 AM    MCHC 32.5 02/25/2024 12:09 AM    MPV 10.9 02/25/2024 12:09 AM      Lab Results   Component Value Date/Time    SODIUM 138 02/25/2024 12:09 AM    POTASSIUM 3.7 02/25/2024 12:09 AM    CHLORIDE 103 02/25/2024 12:09 AM    CO2 22 02/25/2024 12:09 AM    GLUCOSE 92 02/25/2024 12:09 AM    BUN 14 02/25/2024 12:09 AM    CREATININE 0.56 02/25/2024 12:09 AM    CREATININE 0.9  10/12/2006 08:58 AM      Lab Results   Component Value Date/Time    ASTSGOT 21 02/23/2024 08:18 PM    ALTSGPT 27 02/23/2024 08:18 PM     Lab Results   Component Value Date/Time    CHOLSTRLTOT 173 02/23/2024 08:18 PM     (H) 02/23/2024 08:18 PM    HDL 50 02/23/2024 08:18 PM    TRIGLYCERIDE 98 02/23/2024 08:18 PM    TROPONINT 8 02/24/2024 08:49 AM       Recent Labs     02/23/24  2018   NTPROBNP 60       Cardiac Imaging and Procedures Review  EKG: Sinus rhythm, PVCs, nonspecific T wave abnormalities, inferior leads.      Echocardiogram:    2/24/2024  Prior study on 11/8/11, compared to the report of the prior study,   there has been progression of his aortic stenosis.   Normal left ventricular systolic function.  The left ventricular ejection fraction is visually estimated to be 60%.  Calcified, likely bicuspid aortic valve.  Severe aortic valve stenosis.  Vmax is 4.25 m/s. Transvalvular gradients are - Peak: 72 mmHg, Mean: 39   mmHg.  Mild aortic insufficiency.  Mild tricuspid regurgitation.  Estimated right ventricular systolic pressure is 30 mmHg.    Cardiac Catheterization: Pending      Imaging  Chest X-Ray:    Cardiomegaly           Assessment/Plan    # Severe aortic stenosis.  # Bicuspid aortic valve per echo.  # Intermittent chest discomfort, troponin negative, EKG without any evidence of ischemia.  # Hypertension, well-controlled  # BMI 31  # Known history of seizure disorder, on Depakote.  # LVEF 60%.    -Appreciate CTS consult for AVR.  -Plan for [facility] 2/26/2024.  -Keep n.p.o. at midnight.  -Currently on aspirin 81, atorvastatin 40.  -SBP~120's .on home dose amlodipine 10, atenolol 50.    Cardiology will follow along.      I personally spent a total of 15 minutes which includes face-to-face time and non-face-to-face time spent on preparing to see the patient, reviewing hospital notes and tests, obtaining history from the patient, performing a medically appropriate exam, counseling and educating the  patient, ordering medications/tests/procedures/referrals as clinically indicated, and documenting information in the electronic medical record.     Thank you for allowing me to participate in the care of this patient.  I will continue to follow this patient    Please contact me with any questions.    FLAKITO Giraldo.   Cardiologist, [facility]  [phone]

## 2024-02-25 NOTE — CONSULTS
Cardiology Consult Note    DOS: 2/24/2024    Consulting physician: Dimitri Gallo MD    Chief complaint/Reason for consult: Severe AS    HPI:  Pt is a 32 yo M. History of seizure disorder. History of HTN and pre-diabetes. History of prior heart murmur and ?known bicuspid valve. In any case does not follow regularly with cardiologist. Sees PCP who manages HTN and pre-diabetes for him. Starting two weeks ago intermittent central CP, off/on. Moderate in intensity. Not necessarily exertional, but worse with stress. 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 Echo here showing severe AS.    ROS (+ highlighted in red):  Constitutional: Fevers/chills/fatigue/weightloss  HEENT: Blurry vision/eye pain/sore throat/hearing loss  Respiratory: Shortness of breath/cough  Cardiovascular: Chest pain/palpitations/edema/orthopnea/syncope  GI: Nausea/vomitting/diarrhea  MSK: Arthralgias/myagias/muscle weakness  Skin: Rash/sores  Neurological: Numbness/tremors/vertigo  Endocrine: Excessive thirst/polyuria/cold intolerance/heat intolerance  Psych: Depression/anxiety    Past Medical History:   Diagnosis Date    Benign heart murmur     Dr. Raven Reynaga    Breath shortness 12/2020    occasional since intubation 9/2020 no O2    History of COVID-19 09/17/2021    Pneumonia 09/2020    intubated    Seizure disorder (HCC)     Dr. Evans unknown reason last 9/18/20    Snoring        Past Surgical History:   Procedure Laterality Date    SC LARYNGOSCOPY,DIRCT,OP,BIOPSY N/A 12/14/2021    Procedure: LARYNGOSCOPY,DIRECT,WITH BIOPSY IF INDICATED;  Surgeon: Johnny Martines M.D.;  Location: SURGERY SAME DAY [facility];  Service: Ent    SC BRONCHOSCOPY,TRACH/BRONCH DILATN N/A 12/14/2021    Procedure: TRACHEAL DILATION,TRACHEOPLASTY,USING BALLOON;  Surgeon: Johnny Martines M.D.;  Location: SURGERY SAME DAY [facility];  Service: Ent    LARYNGOSCOPY  2/23/2021    Procedure: LARYNGOSCOPY -  DIRECT W/TRACHEAL DILATION;  Surgeon: Johnny Martines M.D.;  Location: SURGERY SAME DAY [facility];  Service: Ent    GA BRONCHOSCOPY,DIAGNOSTIC N/A 12/31/2020    Procedure: BRONCHOSCOPY - WITH DILATION;  Surgeon: Johnny Martines M.D.;  Location: SURGERY SAME DAY [facility];  Service: Ent    LARYNGOSCOPY N/A 12/31/2020    Procedure: LARYNGOSCOPY - DIRECT;  Surgeon: Johnny Martines M.D.;  Location: SURGERY SAME DAY [facility];  Service: Ent    LARYNGOSCOPY N/A 11/20/2020    Procedure: LARYNGOSCOPY - DIRECT W/BALLOON DILATION;  Surgeon: Johnny Martines M.D.;  Location: SURGERY SAME DAY [facility];  Service: Ent    OTHER      tubes in ears     TONSILLECTOMY         Social History     Socioeconomic History    Marital status: Single     Spouse name: Not on file    Number of children: Not on file    Years of education: Not on file    Highest education level: Not on file   Occupational History    Not on file   Tobacco Use    Smoking status: Never    Smokeless tobacco: Never   Vaping Use    Vaping Use: Never used   Substance and Sexual Activity    Alcohol use: Yes     Comment: one beer every 3 weeks or so    Drug use: No    Sexual activity: Not Currently   Other Topics Concern    Not on file   Social History Narrative    Not on file     Social Determinants of Health     Financial Resource Strain: Not on file   Food Insecurity: Not on file   Transportation Needs: Not on file   Physical Activity: Not on file   Stress: Not on file   Social Connections: Not on file   Intimate Partner Violence: Not on file   Housing Stability: Not on file       Family History   Problem Relation Age of Onset    Hypertension Mother        No Known Allergies    Current Facility-Administered Medications   Medication Dose Route Frequency Provider Last Rate Last Admin    atorvastatin (Lipitor) tablet 40 mg  40 mg Oral DAILY Josemanuel Pierre M.D.   40 mg at 02/24/24 0511    aspirin EC tablet 81 mg  81 mg Oral DAILY Josemanuel Pierre M.D.   81 mg at  02/24/24 0512    enoxaparin (Lovenox) inj 40 mg  40 mg Subcutaneous DAILY AT 1800 Dimitri Gallo M.D.   40 mg at 02/24/24 1714    senna-docusate (Pericolace Or Senokot S) 8.6-50 MG per tablet 2 Tablet  2 Tablet Oral Q EVENING Josemanuel Pierre M.D.   2 Tablet at 02/23/24 2211    And    polyethylene glycol/lytes (Miralax) Packet 1 Packet  1 Packet Oral QDAY PRN Josemanuel Pierre M.D.        divalproex ER (Depakote ER) tablet 500 mg  500 mg Oral BID Josemanuel Pierre M.D.   500 mg at 02/24/24 0917    atenolol (Tenormin) tablet 50 mg  50 mg Oral DAILY Josemanuel Pierre M.D.   50 mg at 02/24/24 0511    amLODIPine (Norvasc) tablet 10 mg  10 mg Oral DAILY Josemanuel Pierre M.D.   10 mg at 02/24/24 0511       Physical Exam:  Vitals:    02/24/24 0936 02/24/24 1124 02/24/24 1517 02/24/24 1922   BP: 114/73 119/73 127/67 139/79   Pulse: 64 60 76 74   Resp: 20 20 20 18   Temp: 37 °C (98.6 °F) 37 °C (98.6 °F) 36.9 °C (98.4 °F) 37 °C (98.6 °F)   TempSrc: Temporal Temporal Temporal Temporal   SpO2: 94% 93% 92% 92%   Weight:       Height:         General appearance: NAD, conversant   Eyes: anicteric sclerae, moist conjunctivae; no lid-lag; PERRLA  HENT: Atraumatic; oropharynx clear with moist mucous membranes and no mucosal ulcerations; normal hard and soft palate  Neck: Trachea midline; FROM, supple, no thyromegaly or lymphadenopathy  Lungs: CTA, with normal respiratory effort and no intercostal retractions  CV: RRR, 3/6 systolic murmur, no JVD   Abdomen: Soft, non-tender; no masses or HSM  Extremities: No peripheral edema or extremity lymphadenopathy  Skin: Normal temperature, turgor and texture; no rash, ulcers or subcutaneous nodules  Psych: Appropriate affect, alert and oriented to person, place and time    Data:  Labs reviewed    Prior echo/stress results reviewed:  LVEF 60%, looks like NCC/LCC fused with calcification, Vmax > 4m/s, mean gradient 40 mmHg    CXR interpreted by me:  wNL    EKG interpreted by me:   Sinus, PVC    Impression/Plan:  1)  Severe AS    -Symptomatic severe AS, the R cusp still looks like opening fairly well, not critical, but Vmax qualifies   -Recommend consult CT surgery for AVR  -Relatively young to have obstructive coronary disease, but we can exclude with angiography prior to mechanical valve replacement    Carmen Lombardo MD

## 2024-02-25 NOTE — PROGRESS NOTES
Received bedside report from LARA Montenegro, pt care assumed. VS WDL, pt assessment complete. Pt A&Ox4, no c/o pain at this time. POC discussed with pt and verbalizes no questions. Pt denies any additional needs at this time. Bed locked and in lowest position, bed alarm off as client is up to self. Pt educated on fall risk and verbalized understanding, call light within reach, hourly rounding initiated.

## 2024-02-25 NOTE — PROGRESS NOTES
Telemetry Shift Summary      Rhythm: SR  HR: 59-75  Ectopy: rPVC     Measurements: .11/.08/.38

## 2024-02-25 NOTE — PROGRESS NOTES
Hospital Medicine Daily Progress Note    Date of Service  2/25/2024    Chief Complaint  Umesh Chang is a 33 y.o. male admitted 2/23/2024 with chest pain     Hospital Course  This is a 33 y.o. male who presented 2/23/2024 with acute chest pressure.  Patient reports that symptom onset was approximately 2 weeks ago and has been intermittent ever since then.  Patient denies any instigating factors and unsure what brings it upon.  Patient states that he can experience the same intensity of chest pressure whether he has walking around or at rest and there is no definite improvement with rest.  Patient denies any personal history of coronary artery disease but does report that he may have a questionable bicuspid aortic valve.  Patient also reports a family history of valvular issues.   Patient admitted for further work up    Interval Problem Update  Patient seen and examined no chest pain this AM. Denies any naue or vomitng  Echo and stress test pending   Close monitoring on tele    2/25: Patient seen and examined, cardiology consulted yesterday regarding severe aortic stenosis and following, case discussed Plan is for cardiac cath tomorrow and CTS has been consulted for possible AVR    I have discussed this patient's plan of care and discharge plan at IDT rounds today with Case Management, Nursing, Nursing leadership, and other members of the IDT team.    Consultants/Specialty  None     Code Status  Full Code    Disposition  The patient is not medically cleared for discharge to home or a post-acute facility.      I have placed the appropriate orders for post-discharge needs.    Review of Systems  Review of Systems   Constitutional:  Negative for chills, fever and malaise/fatigue.   HENT:  Negative for congestion, hearing loss and sore throat.    Eyes:  Negative for blurred vision and double vision.   Respiratory:  Negative for cough, hemoptysis and shortness of breath.    Cardiovascular:  Positive for chest pain. Negative  for palpitations and leg swelling.   Gastrointestinal:  Negative for abdominal pain, constipation, diarrhea, heartburn, nausea and vomiting.   Genitourinary:  Negative for dysuria and hematuria.   Musculoskeletal:  Negative for back pain, joint pain and neck pain.   Skin:  Negative for rash.   Neurological:  Negative for dizziness, tingling, weakness and headaches.   Psychiatric/Behavioral:  The patient is not nervous/anxious and does not have insomnia.         Physical Exam  Temp:  [36.6 °C (97.9 °F)-37 °C (98.6 °F)] 37 °C (98.6 °F)  Pulse:  [62-76] 62  Resp:  [18-20] 18  BP: (112-139)/(67-79) 125/73  SpO2:  [92 %-95 %] 95 %    Physical Exam  Vitals and nursing note reviewed.   Constitutional:       Appearance: Normal appearance. He is not toxic-appearing.   HENT:      Head: Normocephalic and atraumatic.      Right Ear: External ear normal.      Left Ear: External ear normal.      Nose: No rhinorrhea.      Mouth/Throat:      Mouth: Mucous membranes are moist.   Eyes:      Extraocular Movements: Extraocular movements intact.      Conjunctiva/sclera: Conjunctivae normal.   Cardiovascular:      Rate and Rhythm: Normal rate and regular rhythm.      Heart sounds: Murmur heard.      No gallop.      Comments: 4 out of 6 systolic murmur best heard around left sternal border  Pulmonary:      Breath sounds: Normal breath sounds. No wheezing or rales.   Abdominal:      General: Bowel sounds are normal. There is no distension.      Tenderness: There is no abdominal tenderness.   Musculoskeletal:      Cervical back: Neck supple.      Right lower leg: No edema.      Left lower leg: No edema.   Skin:     Capillary Refill: Capillary refill takes less than 2 seconds.      Findings: No rash.   Neurological:      Mental Status: He is oriented to person, place, and time.   Psychiatric:         Mood and Affect: Mood normal.         Behavior: Behavior normal.         Thought Content: Thought content normal.          Fluids    Intake/Output Summary (Last 24 hours) at 2/25/2024 1251  Last data filed at 2/24/2024 2359  Gross per 24 hour   Intake 800 ml   Output --   Net 800 ml       Laboratory  Recent Labs     02/23/24 2018 02/24/24  0032 02/25/24  0009   WBC 7.7 8.3 9.2   RBC 6.42* 5.77 6.29*   HEMOGLOBIN 15.2 13.6* 14.6   HEMATOCRIT 45.9 41.1* 44.9   MCV 71.5* 71.2* 71.4*   MCH 23.7* 23.6* 23.2*   MCHC 33.1 33.1 32.5   RDW 35.0* 34.6* 34.4*   PLATELETCT 260 244 264   MPV 11.1 11.1 10.9     Recent Labs     02/23/24 2018 02/24/24  0032 02/25/24  0009   SODIUM 139 141 138   POTASSIUM 3.7 3.7 3.7   CHLORIDE 101 106 103   CO2 25 23 22   GLUCOSE 89 132* 92   BUN 12 11 14   CREATININE 0.66 0.59 0.56   CALCIUM 9.4 8.8 9.0     Recent Labs     02/23/24 2018   APTT 30.4   INR 0.94         Recent Labs     02/23/24 2018   TRIGLYCERIDE 98   HDL 50   *       Imaging  EC-ECHOCARDIOGRAM COMPLETE W/ CONT   Final Result      DX-CHEST-PORTABLE (1 VIEW)   Final Result      Cardiomegaly.           Assessment/Plan  * NSTEMI (non-ST elevated myocardial infarction) (HCC)- (present on admission)  Assessment & Plan  In setting of bicuspid aortic valve, possible underlying stress could increase cardiac demand  Troponin: 23  EKG: Possible Q waves and inverted T waves in inferior leads   Echo showed aortic severe stenosis and cardiology consulted plan is for cardiac cath tomorrow  Also CTS consulted for possible AVR         HTN (hypertension)  Assessment & Plan  Continue home amlodipine and atenolol    Obesity (BMI 30-39.9)  Assessment & Plan  BMI of 31.9  Further counseling on weight loss on discharge    Bicuspid aortic valve- (present on admission)  Assessment & Plan  Per echocardiogram in 2011, bicuspid aortic valve  Holosystolic murmur appreciated  Repeat echocardiogram pending    Seizure disorder (HCC)- (present on admission)  Assessment & Plan  Per patient, has not had a recent breakthrough seizure  Continue home Depakote         VTE  prophylaxis: Lovenox  ppx     Greater than 51 minutes spent prepping to see patient (e.g. review of tests) obtaining and/or reviewing separately obtained history. Performing a medically appropriate examination and/ evaluation.  Counseling and educating the patient/family/caregiver.  Ordering medications, tests, or procedures.  Referring and communicating with other health care professionals.  Documenting clinical information in EPIC.  Independently interpreting results and communicating results to patient/family/caregiver.  Care coordination.     I have performed a physical exam and reviewed and updated ROS and Plan today (2/25/2024). In review of yesterday's note (2/24/2024), there are no changes except as documented above.

## 2024-02-26 ENCOUNTER — APPOINTMENT (OUTPATIENT)
Dept: CARDIOLOGY | Facility: MEDICAL CENTER | Age: 34
DRG: 218 | End: 2024-02-26
Attending: NURSE PRACTITIONER
Payer: COMMERCIAL

## 2024-02-26 ENCOUNTER — APPOINTMENT (OUTPATIENT)
Dept: RADIOLOGY | Facility: MEDICAL CENTER | Age: 34
DRG: 218 | End: 2024-02-26
Attending: NURSE PRACTITIONER
Payer: COMMERCIAL

## 2024-02-26 PROBLEM — I77.810 DILATED AORTIC ROOT (HCC): Status: ACTIVE | Noted: 2024-02-26

## 2024-02-26 PROBLEM — I35.0 AORTIC STENOSIS: Status: ACTIVE | Noted: 2024-02-23

## 2024-02-26 LAB
ABO + RH BLD: NORMAL
ABO GROUP BLD: NORMAL
APPEARANCE UR: CLEAR
BILIRUB UR QL STRIP.AUTO: NEGATIVE
BLD GP AB SCN SERPL QL: NORMAL
COLOR UR: YELLOW
EKG IMPRESSION: NORMAL
EKG IMPRESSION: NORMAL
GLUCOSE UR STRIP.AUTO-MCNC: NEGATIVE MG/DL
KETONES UR STRIP.AUTO-MCNC: NEGATIVE MG/DL
LEUKOCYTE ESTERASE UR QL STRIP.AUTO: NEGATIVE
MICRO URNS: ABNORMAL
NITRITE UR QL STRIP.AUTO: NEGATIVE
PH UR STRIP.AUTO: 6.5 [PH] (ref 5–8)
PROT UR QL STRIP: NEGATIVE MG/DL
RBC UR QL AUTO: NEGATIVE
RH BLD: NORMAL
SP GR UR STRIP.AUTO: >=1.045
UROBILINOGEN UR STRIP.AUTO-MCNC: 2 MG/DL

## 2024-02-26 PROCEDURE — 86850 RBC ANTIBODY SCREEN: CPT

## 2024-02-26 PROCEDURE — 93567 NJX CAR CTH SPRVLV AORTGRPHY: CPT | Performed by: INTERNAL MEDICINE

## 2024-02-26 PROCEDURE — 700105 HCHG RX REV CODE 258: Performed by: NURSE PRACTITIONER

## 2024-02-26 PROCEDURE — 71046 X-RAY EXAM CHEST 2 VIEWS: CPT

## 2024-02-26 PROCEDURE — 93010 ELECTROCARDIOGRAM REPORT: CPT | Mod: 59 | Performed by: INTERNAL MEDICINE

## 2024-02-26 PROCEDURE — C1894 INTRO/SHEATH, NON-LASER: HCPCS

## 2024-02-26 PROCEDURE — 700105 HCHG RX REV CODE 258: Performed by: INTERNAL MEDICINE

## 2024-02-26 PROCEDURE — 81003 URINALYSIS AUTO W/O SCOPE: CPT

## 2024-02-26 PROCEDURE — 93880 EXTRACRANIAL BILAT STUDY: CPT

## 2024-02-26 PROCEDURE — 770022 HCHG ROOM/CARE - ICU (200)

## 2024-02-26 PROCEDURE — A9270 NON-COVERED ITEM OR SERVICE: HCPCS

## 2024-02-26 PROCEDURE — 99152 MOD SED SAME PHYS/QHP 5/>YRS: CPT | Performed by: INTERNAL MEDICINE

## 2024-02-26 PROCEDURE — B3101ZZ FLUOROSCOPY OF THORACIC AORTA USING LOW OSMOLAR CONTRAST: ICD-10-PCS | Performed by: INTERNAL MEDICINE

## 2024-02-26 PROCEDURE — 99223 1ST HOSP IP/OBS HIGH 75: CPT | Mod: 57 | Performed by: THORACIC SURGERY (CARDIOTHORACIC VASCULAR SURGERY)

## 2024-02-26 PROCEDURE — 93454 CORONARY ARTERY ANGIO S&I: CPT | Mod: 26 | Performed by: INTERNAL MEDICINE

## 2024-02-26 PROCEDURE — B2111ZZ FLUOROSCOPY OF MULTIPLE CORONARY ARTERIES USING LOW OSMOLAR CONTRAST: ICD-10-PCS | Performed by: INTERNAL MEDICINE

## 2024-02-26 PROCEDURE — 700101 HCHG RX REV CODE 250

## 2024-02-26 PROCEDURE — 86900 BLOOD TYPING SEROLOGIC ABO: CPT

## 2024-02-26 PROCEDURE — 93010 ELECTROCARDIOGRAM REPORT: CPT | Mod: 59,76 | Performed by: INTERNAL MEDICINE

## 2024-02-26 PROCEDURE — 700117 HCHG RX CONTRAST REV CODE 255: Performed by: INTERNAL MEDICINE

## 2024-02-26 PROCEDURE — 99233 SBSQ HOSP IP/OBS HIGH 50: CPT | Performed by: INTERNAL MEDICINE

## 2024-02-26 PROCEDURE — 700111 HCHG RX REV CODE 636 W/ 250 OVERRIDE (IP): Mod: JZ

## 2024-02-26 PROCEDURE — 86901 BLOOD TYPING SEROLOGIC RH(D): CPT

## 2024-02-26 PROCEDURE — 700102 HCHG RX REV CODE 250 W/ 637 OVERRIDE(OP)

## 2024-02-26 PROCEDURE — 93005 ELECTROCARDIOGRAM TRACING: CPT | Performed by: NURSE PRACTITIONER

## 2024-02-26 PROCEDURE — 99232 SBSQ HOSP IP/OBS MODERATE 35: CPT | Mod: 25 | Performed by: INTERNAL MEDICINE

## 2024-02-26 RX ORDER — MIDAZOLAM HYDROCHLORIDE 1 MG/ML
INJECTION INTRAMUSCULAR; INTRAVENOUS
Status: COMPLETED
Start: 2024-02-26 | End: 2024-02-26

## 2024-02-26 RX ORDER — HEPARIN SODIUM 1000 [USP'U]/ML
INJECTION, SOLUTION INTRAVENOUS; SUBCUTANEOUS
Status: COMPLETED
Start: 2024-02-26 | End: 2024-02-26

## 2024-02-26 RX ORDER — LIDOCAINE HYDROCHLORIDE 20 MG/ML
INJECTION, SOLUTION INFILTRATION; PERINEURAL
Status: COMPLETED
Start: 2024-02-26 | End: 2024-02-26

## 2024-02-26 RX ORDER — METHADONE HYDROCHLORIDE 10 MG/ML
20 INJECTION, SOLUTION INTRAMUSCULAR; INTRAVENOUS; SUBCUTANEOUS ONCE
Status: COMPLETED | OUTPATIENT
Start: 2024-02-27 | End: 2024-02-27

## 2024-02-26 RX ORDER — DEXMEDETOMIDINE HYDROCHLORIDE 4 UG/ML
0-1.5 INJECTION, SOLUTION INTRAVENOUS CONTINUOUS
Status: DISCONTINUED | OUTPATIENT
Start: 2024-02-27 | End: 2024-02-27

## 2024-02-26 RX ORDER — VERAPAMIL HYDROCHLORIDE 2.5 MG/ML
INJECTION, SOLUTION INTRAVENOUS
Status: COMPLETED
Start: 2024-02-26 | End: 2024-02-26

## 2024-02-26 RX ORDER — EPINEPHRINE HCL IN 0.9 % NACL 4MG/250ML
0-.5 PLASTIC BAG, INJECTION (ML) INTRAVENOUS CONTINUOUS
Status: DISCONTINUED | OUTPATIENT
Start: 2024-02-27 | End: 2024-02-27

## 2024-02-26 RX ORDER — SODIUM CHLORIDE 9 MG/ML
INJECTION, SOLUTION INTRAVENOUS CONTINUOUS
Status: DISCONTINUED | OUTPATIENT
Start: 2024-02-26 | End: 2024-02-27

## 2024-02-26 RX ORDER — NOREPINEPHRINE BITARTRATE 0.03 MG/ML
0-1 INJECTION, SOLUTION INTRAVENOUS CONTINUOUS
Status: DISCONTINUED | OUTPATIENT
Start: 2024-02-27 | End: 2024-02-27

## 2024-02-26 RX ORDER — HEPARIN SODIUM 200 [USP'U]/100ML
INJECTION, SOLUTION INTRAVENOUS
Status: COMPLETED
Start: 2024-02-26 | End: 2024-02-26

## 2024-02-26 RX ORDER — ACETAMINOPHEN 500 MG
1000 TABLET ORAL ONCE
Status: DISCONTINUED | OUTPATIENT
Start: 2024-02-27 | End: 2024-02-27

## 2024-02-26 RX ADMIN — NITROGLYCERIN 10 ML: 20 INJECTION INTRAVENOUS at 14:56

## 2024-02-26 RX ADMIN — LIDOCAINE HYDROCHLORIDE: 20 INJECTION, SOLUTION INFILTRATION; PERINEURAL at 14:56

## 2024-02-26 RX ADMIN — IOHEXOL 100 ML: 350 INJECTION, SOLUTION INTRAVENOUS at 15:32

## 2024-02-26 RX ADMIN — AMLODIPINE BESYLATE 10 MG: 10 TABLET ORAL at 04:08

## 2024-02-26 RX ADMIN — ATENOLOL 50 MG: 50 TABLET ORAL at 04:08

## 2024-02-26 RX ADMIN — HEPARIN SODIUM: 1000 INJECTION, SOLUTION INTRAVENOUS; SUBCUTANEOUS at 14:56

## 2024-02-26 RX ADMIN — SODIUM CHLORIDE: 9 INJECTION, SOLUTION INTRAVENOUS at 04:16

## 2024-02-26 RX ADMIN — DIVALPROEX SODIUM 500 MG: 500 TABLET, EXTENDED RELEASE ORAL at 07:32

## 2024-02-26 RX ADMIN — DOCUSATE SODIUM 50 MG AND SENNOSIDES 8.6 MG 2 TABLET: 8.6; 5 TABLET, FILM COATED ORAL at 17:21

## 2024-02-26 RX ADMIN — SODIUM CHLORIDE: 9 INJECTION, SOLUTION INTRAVENOUS at 18:55

## 2024-02-26 RX ADMIN — ATORVASTATIN CALCIUM 40 MG: 40 TABLET, FILM COATED ORAL at 04:08

## 2024-02-26 RX ADMIN — FENTANYL CITRATE 100 MCG: 50 INJECTION, SOLUTION INTRAMUSCULAR; INTRAVENOUS at 15:24

## 2024-02-26 RX ADMIN — SODIUM CHLORIDE: 9 INJECTION, SOLUTION INTRAVENOUS at 16:15

## 2024-02-26 RX ADMIN — ASPIRIN 81 MG: 81 TABLET, COATED ORAL at 04:08

## 2024-02-26 RX ADMIN — HEPARIN SODIUM 2000 UNITS: 200 INJECTION, SOLUTION INTRAVENOUS at 14:55

## 2024-02-26 RX ADMIN — VERAPAMIL HYDROCHLORIDE 2.5 MG: 2.5 INJECTION, SOLUTION INTRAVENOUS at 14:56

## 2024-02-26 RX ADMIN — DIVALPROEX SODIUM 500 MG: 500 TABLET, EXTENDED RELEASE ORAL at 21:32

## 2024-02-26 RX ADMIN — MIDAZOLAM HYDROCHLORIDE 2 MG: 2 INJECTION, SOLUTION INTRAMUSCULAR; INTRAVENOUS at 15:24

## 2024-02-26 ASSESSMENT — ENCOUNTER SYMPTOMS
WHEEZING: 0
CHILLS: 0
BACK PAIN: 0
FEVER: 0
NERVOUS/ANXIOUS: 0
HEMOPTYSIS: 0
EYE DISCHARGE: 0
INSOMNIA: 0
BRUISES/BLEEDS EASILY: 0
FOCAL WEAKNESS: 0
SPUTUM PRODUCTION: 0
DIAPHORESIS: 0
MYALGIAS: 0
CONSTIPATION: 0
SORE THROAT: 0
WEIGHT LOSS: 0
PALPITATIONS: 0
DIZZINESS: 0
HALLUCINATIONS: 0
NECK PAIN: 0
HEADACHES: 0
SHORTNESS OF BREATH: 0
EYE PAIN: 0
WEAKNESS: 0
DEPRESSION: 0
BLURRED VISION: 0
POLYDIPSIA: 0
DOUBLE VISION: 0
NAUSEA: 0
ABDOMINAL PAIN: 0
SEIZURES: 0
STRIDOR: 0
HEARTBURN: 0
FLANK PAIN: 0
COUGH: 0
DIARRHEA: 0
VOMITING: 0
SPEECH CHANGE: 0
ORTHOPNEA: 0
TINGLING: 0

## 2024-02-26 ASSESSMENT — PAIN DESCRIPTION - PAIN TYPE: TYPE: ACUTE PAIN

## 2024-02-26 ASSESSMENT — LIFESTYLE VARIABLES: SUBSTANCE_ABUSE: 0

## 2024-02-26 ASSESSMENT — FIBROSIS 4 INDEX: FIB4 SCORE: 0.51

## 2024-02-26 NOTE — PROGRESS NOTES
Hospital Medicine Daily Progress Note    Date of Service  2/26/2024    Chief Complaint  Umesh Chang is a 33 y.o. male admitted 2/23/2024 with chest pain     Hospital Course  This is a 33 y.o. male who presented 2/23/2024 with acute chest pressure.  Patient reports that symptom onset was approximately 2 weeks ago and has been intermittent ever since then.  Patient denies any instigating factors and unsure what brings it upon.  Patient states that he can experience the same intensity of chest pressure whether he has walking around or at rest and there is no definite improvement with rest.  Patient denies any personal history of coronary artery disease but does report that he may have a questionable bicuspid aortic valve.  Patient also reports a family history of valvular issues.   Patient admitted for further work up    Interval Problem Update  Patient seen and examined no chest pain this AM. Denies any naue or vomitng  Echo and stress test pending   Close monitoring on tele    2/25: Patient seen and examined, cardiology consulted yesterday regarding severe aortic stenosis and following, case discussed Plan is for cardiac cath tomorrow and CTS has been consulted for possible AVR  2/26: Patient seen and examined, afebrile, cardiology and CTS following, plan for cardiac cath today and also CTS planing for surgery for AVR tomorrow   Appreciate rec.   I have discussed this patient's plan of care and discharge plan at IDT rounds today with Case Management, Nursing, Nursing leadership, and other members of the IDT team.    Consultants/Specialty  None     Code Status  Full Code    Disposition  The patient is not medically cleared for discharge to home or a post-acute facility.      I have placed the appropriate orders for post-discharge needs.    Review of Systems  Review of Systems   Constitutional:  Negative for chills, fever and malaise/fatigue.   HENT:  Negative for congestion, hearing loss and sore throat.    Eyes:   Negative for blurred vision and double vision.   Respiratory:  Negative for cough, hemoptysis and shortness of breath.    Cardiovascular:  Positive for chest pain. Negative for palpitations and leg swelling.   Gastrointestinal:  Negative for abdominal pain, constipation, diarrhea, heartburn, nausea and vomiting.   Genitourinary:  Negative for dysuria and hematuria.   Musculoskeletal:  Negative for back pain, joint pain and neck pain.   Skin:  Negative for rash.   Neurological:  Negative for dizziness, tingling, weakness and headaches.   Psychiatric/Behavioral:  The patient is not nervous/anxious and does not have insomnia.         Physical Exam  Temp:  [36.4 °C (97.5 °F)-36.7 °C (98.1 °F)] 36.6 °C (97.9 °F)  Pulse:  [62-77] 62  Resp:  [16-18] 16  BP: (119-127)/(75-80) 124/76  SpO2:  [91 %-96 %] 95 %    Physical Exam  Vitals and nursing note reviewed.   Constitutional:       Appearance: Normal appearance. He is not toxic-appearing.   HENT:      Head: Normocephalic and atraumatic.      Right Ear: External ear normal.      Left Ear: External ear normal.      Nose: No rhinorrhea.      Mouth/Throat:      Mouth: Mucous membranes are moist.   Eyes:      Extraocular Movements: Extraocular movements intact.      Conjunctiva/sclera: Conjunctivae normal.   Cardiovascular:      Rate and Rhythm: Normal rate and regular rhythm.      Heart sounds: Murmur heard.      No gallop.      Comments: 4 out of 6 systolic murmur best heard around left sternal border  Pulmonary:      Breath sounds: Normal breath sounds. No wheezing or rales.   Abdominal:      General: Bowel sounds are normal. There is no distension.      Tenderness: There is no abdominal tenderness.   Musculoskeletal:      Cervical back: Neck supple.      Right lower leg: No edema.      Left lower leg: No edema.   Skin:     Capillary Refill: Capillary refill takes less than 2 seconds.      Findings: No rash.   Neurological:      Mental Status: He is oriented to person, place,  and time.   Psychiatric:         Mood and Affect: Mood normal.         Behavior: Behavior normal.         Thought Content: Thought content normal.         Fluids    Intake/Output Summary (Last 24 hours) at 2/26/2024 1208  Last data filed at 2/26/2024 1200  Gross per 24 hour   Intake 0 ml   Output 0 ml   Net 0 ml       Laboratory  Recent Labs     02/23/24 2018 02/24/24  0032 02/25/24  0009   WBC 7.7 8.3 9.2   RBC 6.42* 5.77 6.29*   HEMOGLOBIN 15.2 13.6* 14.6   HEMATOCRIT 45.9 41.1* 44.9   MCV 71.5* 71.2* 71.4*   MCH 23.7* 23.6* 23.2*   MCHC 33.1 33.1 32.5   RDW 35.0* 34.6* 34.4*   PLATELETCT 260 244 264   MPV 11.1 11.1 10.9     Recent Labs     02/23/24 2018 02/24/24  0032 02/25/24  0009   SODIUM 139 141 138   POTASSIUM 3.7 3.7 3.7   CHLORIDE 101 106 103   CO2 25 23 22   GLUCOSE 89 132* 92   BUN 12 11 14   CREATININE 0.66 0.59 0.56   CALCIUM 9.4 8.8 9.0     Recent Labs     02/23/24 2018   APTT 30.4   INR 0.94         Recent Labs     02/23/24 2018   TRIGLYCERIDE 98   HDL 50   *       Imaging  US-CAROTID DOPPLER BILAT   Final Result      EC-ECHOCARDIOGRAM COMPLETE W/ CONT   Final Result      DX-CHEST-PORTABLE (1 VIEW)   Final Result      Cardiomegaly.      CL-LEFT HEART CATHETERIZATION WITH POSSIBLE INTERVENTION    (Results Pending)        Assessment/Plan  * Aortic stenosis- (present on admission)  Assessment & Plan  In setting of bicuspid aortic valve, possible underlying stress could increase cardiac demand  Troponin: 23  EKG: Possible Q waves and inverted T waves in inferior leads   Echo showed aortic severe stenosis and cardiology following   Plan for cardiac cath today and and also CTS planing for AVR surgery tomorrow         HTN (hypertension)  Assessment & Plan  Continue home amlodipine and atenolol    Obesity (BMI 30-39.9)  Assessment & Plan  BMI of 31.9  Further counseling on weight loss on discharge    Bicuspid aortic valve- (present on admission)  Assessment & Plan  Per echocardiogram in 2011,  bicuspid aortic valve  Holosystolic murmur appreciated  Repeat echocardiogram pending    Seizure disorder (HCC)- (present on admission)  Assessment & Plan  Per patient, has not had a recent breakthrough seizure  Continue home Depakote         VTE prophylaxis: Lovenox  ppx     Greater than 51 minutes spent prepping to see patient (e.g. review of tests) obtaining and/or reviewing separately obtained history. Performing a medically appropriate examination and/ evaluation.  Counseling and educating the patient/family/caregiver.  Ordering medications, tests, or procedures.  Referring and communicating with other health care professionals.  Documenting clinical information in EPIC.  Independently interpreting results and communicating results to patient/family/caregiver.  Care coordination.     I have performed a physical exam and reviewed and updated ROS and Plan today (2/26/2024). In review of yesterday's note (2/25/2024), there are no changes except as documented above.

## 2024-02-26 NOTE — PROGRESS NOTES
Cardiology Follow-up Consult Note    Date of Service:    2/26/2024      Consulting Physician: Dimitri Gallo M.D.    Name:   Umesh Chang     YOB: 1990  Age:   33 y.o.  male   MRN:   [id number]    Subjective:  Still will have chest discomfort, comes and goes, not really with exertion.    All other review of systems reviewed and negative.    Past medical, surgical, social, and family history reviewed and unchanged from admission except as noted in assessment and plan.    Medications Prior to Admission   Medication Sig Dispense Refill Last Dose    atenolol (TENORMIN) 50 MG Tab Take 50 mg by mouth every day.   2/23/2024 at 0730    amLODIPine (NORVASC) 10 MG Tab Take 10 mg by mouth every day.   2/23/2024 at 0730    divalproex ER (DEPAKOTE ER) 500 MG TABLET SR 24 HR Take 500 mg by mouth 2 (two) times a day.   2/23/2024 at 1100     Current Facility-Administered Medications   Medication Dose Frequency Provider Last Rate Last Admin    NS infusion   Continuous Margot Osuna, A.P.N. 50 mL/hr at 02/26/24 0416 New Bag at 02/26/24 0416    lidocaine (Xylocaine) 1 % injection 0.5 mL  0.5 mL Once PRN Margot Osuna A.P.N.        atorvastatin (Lipitor) tablet 40 mg  40 mg DAILY Josemanuel Pierre M.D.   40 mg at 02/26/24 0408    aspirin EC tablet 81 mg  81 mg DAILY Josemanuel Pierre M.D.   81 mg at 02/26/24 0408    enoxaparin (Lovenox) inj 40 mg  40 mg DAILY AT 1800 Dimitri Gallo M.D.   40 mg at 02/24/24 1714    senna-docusate (Pericolace Or Senokot S) 8.6-50 MG per tablet 2 Tablet  2 Tablet Q EVENING Josemanuel Pierre M.D.   2 Tablet at 02/23/24 2211    And    polyethylene glycol/lytes (Miralax) Packet 1 Packet  1 Packet QDAY PRN Josemanuel Pierre M.D.        divalproex ER (Depakote ER) tablet 500 mg  500 mg BID Josemanuel Pierre M.D.   500 mg at 02/26/24 0732    atenolol (Tenormin) tablet 50 mg  50 mg DAILY Josemanuel Pierre M.D.   50 mg at 02/26/24 0408    amLODIPine (Norvasc) tablet 10 mg  10 mg DAILY Josemanuel Pierre M.D.   10 mg at 02/26/24 0408  "  Last reviewed on 2/23/2024 10:48 PM by Kristi Smith R.N.     No Known Allergies      Intake/Output Summary (Last 24 hours) at 2/26/2024 0904  Last data filed at 2/26/2024 0800  Gross per 24 hour   Intake 0 ml   Output 0 ml   Net 0 ml        Physical Exam  Body mass index is 30.96 kg/m².  /75   Pulse 75   Temp 36.4 °C (97.5 °F) (Temporal)   Resp 16   Ht 1.803 m (5' 11\")   Wt 101 kg (222 lb 0.1 oz)   SpO2 94%   Vitals:    02/25/24 1925 02/25/24 2337 02/26/24 0400 02/26/24 0702   BP: 124/79 127/80 120/79 119/75   Pulse: 70 63 71 75   Resp: 18 18 16 16   Temp: 36.7 °C (98.1 °F) 36.6 °C (97.9 °F) 36.5 °C (97.7 °F) 36.4 °C (97.5 °F)   TempSrc: Temporal Temporal Temporal Temporal   SpO2: 93% 92% 91% 94%   Weight:   101 kg (222 lb 0.1 oz)    Height:         Oxygen Therapy:  Pulse Oximetry: 94 %, O2 (LPM): 0, O2 Delivery Device: None - Room Air    Physical Exam  Constitutional:       Appearance: Normal appearance.   Neck:      Vascular: No JVD.   Cardiovascular:      Rate and Rhythm: Normal rate and regular rhythm.      Pulses: Normal pulses and intact distal pulses.      Heart sounds: S1 normal and S2 normal. Murmur heard.      Systolic murmur is present with a grade of 3/6.      No friction rub. No S3 or S4 sounds.   Pulmonary:      Effort: Pulmonary effort is normal. No respiratory distress.      Breath sounds: Normal breath sounds. No wheezing or rales.   Musculoskeletal:      Cervical back: Neck supple.   Skin:     General: Skin is warm and dry.   Neurological:      Mental Status: He is alert.         Labs (personally reviewed and notable for):   Lab Results   Component Value Date/Time    SODIUM 138 02/25/2024 12:09 AM    POTASSIUM 3.7 02/25/2024 12:09 AM    CHLORIDE 103 02/25/2024 12:09 AM    CO2 22 02/25/2024 12:09 AM    GLUCOSE 92 02/25/2024 12:09 AM    BUN 14 02/25/2024 12:09 AM    CREATININE 0.56 02/25/2024 12:09 AM    CREATININE 0.9 10/12/2006 08:58 AM      Lab Results   Component Value Date/Time "    WBC 9.2 02/25/2024 12:09 AM    RBC 6.29 (H) 02/25/2024 12:09 AM    HEMOGLOBIN 14.6 02/25/2024 12:09 AM    HEMATOCRIT 44.9 02/25/2024 12:09 AM    MCV 71.4 (L) 02/25/2024 12:09 AM    MCH 23.2 (L) 02/25/2024 12:09 AM    MCHC 32.5 02/25/2024 12:09 AM    MPV 10.9 02/25/2024 12:09 AM    NEUTSPOLYS 55.00 02/23/2024 08:18 PM    LYMPHOCYTES 36.40 02/23/2024 08:18 PM    MONOCYTES 7.20 02/23/2024 08:18 PM    EOSINOPHILS 0.40 02/23/2024 08:18 PM    BASOPHILS 0.70 02/23/2024 08:18 PM    HYPOCHROMIA 1+ 05/10/2011 11:43 AM    ANISOCYTOSIS 1+ 10/12/2006 08:58 AM      Lab Results   Component Value Date/Time    CHOLSTRLTOT 173 02/23/2024 08:18 PM     (H) 02/23/2024 08:18 PM    HDL 50 02/23/2024 08:18 PM    TRIGLYCERIDE 98 02/23/2024 08:18 PM       Lab Results   Component Value Date/Time    TROPONINT 8 02/24/2024 0849     Lab Results   Component Value Date/Time    NTPROBNP 60 02/23/2024 2018       Cardiac Imaging and Procedures Review:    Echo dated 2/24/24:  normal LVEF with EF 60%.  Calcified probably bicuspid AV (fused NCC and LCC). Velocity 4.25 m/s and mean gradient of 39 mmHg.    Telemetry Reviewed with Monitor Tech:     Radiology test Review:  EC-ECHOCARDIOGRAM COMPLETE W/ CONT   Final Result      DX-CHEST-PORTABLE (1 VIEW)   Final Result      Cardiomegaly.      CL-LEFT HEART CATHETERIZATION WITH POSSIBLE INTERVENTION    (Results Pending)   US-CAROTID DOPPLER BILAT    (Results Pending)       Impression and Medical Decision Making:   Bicuspid aortic valve (possible fused NCC and LCC) with severe aortic stenosis.  Cath today no significant CAD.  Aortic root though is mildly to moderate dilated. Echo aortic root was normal.  Dr. Torres has already discussed about screening for first degree relatives.  See his note.   -Plan for AVR tomorrow, will check with [facility] if would want CTA to check     Size of aorta as reported to be mildly to moderately dilated on cath.     2.  Mildly to moderately enlarged aortic root size.  Will  check with [facility] about CTA prior to AVR.    Thank you for allowing me to participate in the care of Mr. Chang.  Please do not hesitate to contact me with questions or concerns.    Catherine Soriano MD  Cardiologist, [facility]    Please note that this dictation was created using voice recognition software. I have made every reasonable attempt to correct obvious errors, but it is possible there are errors of grammar and possibly content that I did not discover before finalizing the note.

## 2024-02-26 NOTE — PROGRESS NOTES
Received report from NOC shift RN. Patient is A&Ox4, VSS, no signs of distress, no complaints of chest pain. All questions and concerns answered, NPO since midnight for cardiac cath, bed in lowest and locked position, plan of care ongoing.

## 2024-02-26 NOTE — PROGRESS NOTES
Patient back to unit from cath lab. Tele monitor placed and verified by monitor room. VSS, no signs of distress. Post-op vitals in place, TR band to right radial CDI, no signs of bleeding or hematoma. All questions and concerns answered, call light in reach, plan of care ongoing.

## 2024-02-26 NOTE — PROGRESS NOTES
Received bedside report from RN. Patient is A&Ox4 and denies any pain. Physical assessment completed. Patient states they have no further needs at this time. Patient's belongings and call light are within reach. Plan of care is ongoing.

## 2024-02-26 NOTE — CARE PLAN
The patient is Stable - Low risk of patient condition declining or worsening    Shift Goals  Clinical Goals: Cardiac cath, Open heart, VSS, NPOat midnight saftey, rest  Patient Goals: Rest  Family Goals: SANTIAGO    Progress made toward(s) clinical / shift goals:    Problem: Pain - Standard  Goal: Alleviation of pain or a reduction in pain to the patient’s comfort goal  2/26/2024 0323 by Nahed Puga R.N.  Outcome: Progressing  2/26/2024 0321 by Nahed Puga R.N.  Outcome: Progressing     Problem: Knowledge Deficit - Standard  Goal: Patient and family/care givers will demonstrate understanding of plan of care, disease process/condition, diagnostic tests and medications  2/26/2024 0323 by Nahed Puga R.N.  Outcome: Progressing  2/26/2024 0321 by Nahed Puga R.N.  Outcome: Progressing     Problem: Hemodynamics  Goal: Patient's hemodynamics, fluid balance and neurologic status will be stable or improve  Outcome: Progressing     Problem: Psychosocial  Goal: Patient's level of anxiety will decrease  Outcome: Progressing  Goal: Patient's ability to verbalize feelings about condition will improve  Outcome: Progressing     Problem: Nutrition  Goal: Patient's nutritional and fluid intake will be adequate or improve  Outcome: Progressing     Problem: Mobility  Goal: Patient's capacity to carry out activities will improve  Outcome: Progressing     Problem: Self Care  Goal: Patient will have the ability to perform ADLs independently or with assistance (bathe, groom, dress, toilet and feed)  Outcome: Progressing       Patient is not progressing towards the following goals:

## 2024-02-26 NOTE — PROGRESS NOTES
Monitor Summary  Rhythm: SR Acc. Junctional  Rate: 60-73  Ectopy: (R) PVC  Measurements: 0.09/0.08/0.30  ---12 hr Chart Review---

## 2024-02-26 NOTE — PROCEDURES
Cardiac Catheterization report    2/26/2024  3:57 PM    Referring MD: Dr. Torres    Indication/Preoperative diagnosis:  Severe aortic stenosis  Bicuspid aortic valve    Postoperative diagnosis:  No epicardial coronary artery  Mildly dilated thoracic aorta    Recommendations:  Guideline directed medical therapy   Cardiovascular Risk factor modification  Proceed with SAVR      Procedures performed:  Coronary arteriograms  Left heart catheterization   Supervision moderate sedation  Supravalvular aortography      FINDINGS:  I.  HEMODYNAMICS   Ao: 110/75 mmHg       II. CORONARY ANGIOGRAPHY:  Left main coronary artery: No CAD  Left anterior descending artery: No CAD  Left circumflex coronary artery: No CAD  Right coronary artery: No CAD    III.  AORTOGRAM: Mildly to moderately dilated thoracic aorta    Procedure details:  The risks and benefits of cardiac catheterization and possible intervention were explained to the patient including death, heart attack, stroke, and emergency surgery.  The patient verbalized understanding and wished to proceed.  The patient was brought to the cardiac catheterization laboratory in the fasting state and prepped and draped in the usual sterile fashion.  The right wrist was locally anesthetized with lidocaine and the right radial artery was cannulated with 5/6-Albanian equipment and standard radial cocktail was given.  Coronary angiography was performed using standard  diagnostic catheters in the usual fashion and exchanged for a 6 Albanian pigtail catheter seated above the aortic valve conscious administered for supravalvular aortography. All catheters and guidewires were removed.  A TR-Band was placed without difficulty to achieve patent hemostasis.  Patient tolerated procedure well left the Cath Lab in stable condition.    Complications:  None apparent    Sedation time:  Moderate sedation directly monitored by me during the case while supervising the administration of the sedation medication  by an independent trained RN to assist in the monitoring of the patient's level of consciousness and physiological status. I, the supervising physician was present the entire time from beginning of medication administration until the end of the procedure from 1517 until 1531. For detailed administration records please see the moderate sedation documentation in the media tab.    Following the procedure I discussed the results with the patient and the patients designated contact/family member by phone who did not answer and the voicemail box had no identifying information no voicemail could be left..    Bill Hahn MD, FACC, [facility]

## 2024-02-26 NOTE — PROGRESS NOTES
Monitors reported patient converted to accelerated junctional rhythm. CHAPITO Li notified. STAT EKG ordered.

## 2024-02-27 ENCOUNTER — ANESTHESIA EVENT (OUTPATIENT)
Dept: SURGERY | Facility: MEDICAL CENTER | Age: 34
DRG: 218 | End: 2024-02-27
Payer: COMMERCIAL

## 2024-02-27 ENCOUNTER — APPOINTMENT (OUTPATIENT)
Dept: CARDIOLOGY | Facility: MEDICAL CENTER | Age: 34
DRG: 218 | End: 2024-02-27
Attending: ANESTHESIOLOGY
Payer: COMMERCIAL

## 2024-02-27 ENCOUNTER — ANESTHESIA (OUTPATIENT)
Dept: SURGERY | Facility: MEDICAL CENTER | Age: 34
DRG: 218 | End: 2024-02-27
Payer: COMMERCIAL

## 2024-02-27 ENCOUNTER — APPOINTMENT (OUTPATIENT)
Dept: RADIOLOGY | Facility: MEDICAL CENTER | Age: 34
DRG: 218 | End: 2024-02-27
Attending: THORACIC SURGERY (CARDIOTHORACIC VASCULAR SURGERY)
Payer: COMMERCIAL

## 2024-02-27 PROBLEM — Z95.2 S/P AVR (AORTIC VALVE REPLACEMENT): Status: ACTIVE | Noted: 2024-02-27

## 2024-02-27 PROBLEM — Z99.11 ON MECHANICALLY ASSISTED VENTILATION (HCC): Status: ACTIVE | Noted: 2024-02-27

## 2024-02-27 PROBLEM — J38.6 SUBGLOTTIC STENOSIS: Status: ACTIVE | Noted: 2024-02-27

## 2024-02-27 LAB
ACT BLD: 147 SEC (ref 74–137)
ACT BLD: 158 SEC (ref 74–137)
ACT BLD: 569 SEC (ref 74–137)
ACT BLD: 661 SEC (ref 74–137)
ACT BLD: 839 SEC (ref 74–137)
ALBUMIN SERPL BCP-MCNC: 3.1 G/DL (ref 3.2–4.9)
ALBUMIN/GLOB SERPL: 1.6 G/DL
ALP SERPL-CCNC: 44 U/L (ref 30–99)
ALT SERPL-CCNC: 19 U/L (ref 2–50)
ANION GAP SERPL CALC-SCNC: 7 MMOL/L (ref 7–16)
APTT PPP: 24.8 SEC (ref 24.7–36)
APTT PPP: 42.3 SEC (ref 24.7–36)
ARTERIAL PATENCY WRIST A: ABNORMAL
ARTERIAL PATENCY WRIST A: ABNORMAL
AST SERPL-CCNC: 12 U/L (ref 12–45)
BASE EXCESS BLDA CALC-SCNC: -1 MMOL/L (ref -4–3)
BASE EXCESS BLDA CALC-SCNC: -1 MMOL/L (ref -4–3)
BASE EXCESS BLDA CALC-SCNC: -2 MMOL/L (ref -4–3)
BASE EXCESS BLDA CALC-SCNC: -3 MMOL/L (ref -4–3)
BASE EXCESS BLDA CALC-SCNC: -4 MMOL/L (ref -4–3)
BASE EXCESS BLDA CALC-SCNC: 1 MMOL/L (ref -4–3)
BASE EXCESS BLDV CALC-SCNC: -1 MMOL/L (ref -4–3)
BASE EXCESS BLDV CALC-SCNC: -2 MMOL/L (ref -4–3)
BILIRUB SERPL-MCNC: 0.3 MG/DL (ref 0.1–1.5)
BODY TEMPERATURE: ABNORMAL DEGREES
BUN SERPL-MCNC: 11 MG/DL (ref 8–22)
CA-I BLD ISE-SCNC: 1.01 MMOL/L (ref 1.1–1.3)
CA-I BLD ISE-SCNC: 1.03 MMOL/L (ref 1.1–1.3)
CA-I BLD ISE-SCNC: 1.07 MMOL/L (ref 1.1–1.3)
CA-I BLD ISE-SCNC: 1.09 MMOL/L (ref 1.1–1.3)
CA-I BLD ISE-SCNC: 1.15 MMOL/L (ref 1.1–1.3)
CA-I BLD ISE-SCNC: 1.22 MMOL/L (ref 1.1–1.3)
CA-I BLD ISE-SCNC: 1.22 MMOL/L (ref 1.1–1.3)
CA-I BLD ISE-SCNC: 1.26 MMOL/L (ref 1.1–1.3)
CA-I SERPL-SCNC: 1.1 MMOL/L (ref 1.1–1.3)
CALCIUM ALBUM COR SERPL-MCNC: 7.5 MG/DL (ref 8.5–10.5)
CALCIUM SERPL-MCNC: 6.8 MG/DL (ref 8.5–10.5)
CHLORIDE SERPL-SCNC: 114 MMOL/L (ref 96–112)
CO2 BLDA-SCNC: 21 MMOL/L (ref 20–33)
CO2 BLDA-SCNC: 22 MMOL/L (ref 20–33)
CO2 BLDA-SCNC: 23 MMOL/L (ref 20–33)
CO2 BLDA-SCNC: 23 MMOL/L (ref 20–33)
CO2 BLDA-SCNC: 24 MMOL/L (ref 20–33)
CO2 BLDA-SCNC: 26 MMOL/L (ref 20–33)
CO2 BLDA-SCNC: 26 MMOL/L (ref 20–33)
CO2 BLDA-SCNC: 27 MMOL/L (ref 20–33)
CO2 BLDV-SCNC: 25 MMOL/L (ref 20–33)
CO2 BLDV-SCNC: 26 MMOL/L (ref 20–33)
CO2 SERPL-SCNC: 23 MMOL/L (ref 20–33)
CREAT SERPL-MCNC: 0.47 MG/DL (ref 0.5–1.4)
DELSYS IDSYS: ABNORMAL
EKG IMPRESSION: NORMAL
END TIDAL CARBON DIOXIDE IECO2: 26 MMHG
END TIDAL CARBON DIOXIDE IECO2: 37 MMHG
ERYTHROCYTE [DISTWIDTH] IN BLOOD BY AUTOMATED COUNT: 35.8 FL (ref 35.9–50)
GFR SERPLBLD CREATININE-BSD FMLA CKD-EPI: 140 ML/MIN/1.73 M 2
GLOBULIN SER CALC-MCNC: 1.9 G/DL (ref 1.9–3.5)
GLUCOSE BLD STRIP.AUTO-MCNC: 94 MG/DL (ref 65–99)
GLUCOSE SERPL-MCNC: 87 MG/DL (ref 65–99)
HCO3 BLDA-SCNC: 20.4 MMOL/L (ref 17–25)
HCO3 BLDA-SCNC: 20.9 MMOL/L (ref 17–25)
HCO3 BLDA-SCNC: 21.5 MMOL/L (ref 17–25)
HCO3 BLDA-SCNC: 22 MMOL/L (ref 17–25)
HCO3 BLDA-SCNC: 23.3 MMOL/L (ref 17–25)
HCO3 BLDA-SCNC: 24.4 MMOL/L (ref 17–25)
HCO3 BLDA-SCNC: 24.6 MMOL/L (ref 17–25)
HCO3 BLDA-SCNC: 25.7 MMOL/L (ref 17–25)
HCO3 BLDV-SCNC: 24 MMOL/L (ref 24–28)
HCO3 BLDV-SCNC: 24.4 MMOL/L (ref 24–28)
HCT VFR BLD AUTO: 39.2 % (ref 42–52)
HCT VFR BLD AUTO: 44.3 % (ref 42–52)
HCT VFR BLD CALC: 33 % (ref 42–52)
HCT VFR BLD CALC: 35 % (ref 42–52)
HCT VFR BLD CALC: 37 % (ref 42–52)
HCT VFR BLD CALC: 41 % (ref 42–52)
HCT VFR BLD CALC: 47 % (ref 42–52)
HGB BLD-MCNC: 11.2 G/DL (ref 14–18)
HGB BLD-MCNC: 11.9 G/DL (ref 14–18)
HGB BLD-MCNC: 12.6 G/DL (ref 14–18)
HGB BLD-MCNC: 13.1 G/DL (ref 14–18)
HGB BLD-MCNC: 13.9 G/DL (ref 14–18)
HGB BLD-MCNC: 14.1 G/DL (ref 14–18)
HGB BLD-MCNC: 16 G/DL (ref 14–18)
HOROWITZ INDEX BLDA+IHG-RTO: 124 MM[HG]
HOROWITZ INDEX BLDA+IHG-RTO: 143 MM[HG]
HOROWITZ INDEX BLDA+IHG-RTO: 188 MM[HG]
INR PPP: 1.01 (ref 0.87–1.13)
INR PPP: 1.45 (ref 0.87–1.13)
LACTATE BLD-SCNC: 1.2 MMOL/L (ref 0.5–2)
LACTATE BLD-SCNC: 1.5 MMOL/L (ref 0.5–2)
MAGNESIUM SERPL-MCNC: 2.9 MG/DL (ref 1.5–2.5)
MCH RBC QN AUTO: 23 PG (ref 27–33)
MCHC RBC AUTO-ENTMCNC: 31.8 G/DL (ref 32.3–36.5)
MCV RBC AUTO: 72.3 FL (ref 81.4–97.8)
MODE IMODE: ABNORMAL
O2/TOTAL GAS SETTING VFR VENT: 100 %
O2/TOTAL GAS SETTING VFR VENT: 40 %
O2/TOTAL GAS SETTING VFR VENT: 80 %
PATHOLOGY CONSULT NOTE: NORMAL
PATHOLOGY CONSULT NOTE: NORMAL
PCO2 BLDA: 27.5 MMHG (ref 26–37)
PCO2 BLDA: 34.8 MMHG (ref 26–37)
PCO2 BLDA: 35.2 MMHG (ref 26–37)
PCO2 BLDA: 36 MMHG (ref 26–37)
PCO2 BLDA: 38.6 MMHG (ref 26–37)
PCO2 BLDA: 39.3 MMHG (ref 26–37)
PCO2 BLDA: 42.5 MMHG (ref 26–37)
PCO2 BLDA: 45.7 MMHG (ref 26–37)
PCO2 BLDV: 42.8 MMHG (ref 41–51)
PCO2 BLDV: 44.2 MMHG (ref 41–51)
PCO2 TEMP ADJ BLDA: 26 MMHG (ref 26–37)
PCO2 TEMP ADJ BLDA: 32.1 MMHG (ref 26–37)
PCO2 TEMP ADJ BLDA: 33.7 MMHG (ref 26–37)
PCO2 TEMP ADJ BLDA: 35.2 MMHG (ref 26–37)
PCO2 TEMP ADJ BLDA: 37.3 MMHG (ref 26–37)
PCO2 TEMP ADJ BLDA: 37.8 MMHG (ref 26–37)
PCO2 TEMP ADJ BLDA: 39.3 MMHG (ref 26–37)
PCO2 TEMP ADJ BLDA: 41.1 MMHG (ref 26–37)
PCO2 TEMP ADJ BLDV: 38.2 MMHG (ref 41–51)
PCO2 TEMP ADJ BLDV: 41.5 MMHG (ref 41–51)
PEEP END EXPIRATORY PRESSURE IPEEP: 8 CMH20
PERCENT MINUTE VOLUME IPMV: 100
PERCENT MINUTE VOLUME IPMV: 160
PERCENT MINUTE VOLUME IPMV: 160
PH BLDA: 7.34 [PH] (ref 7.4–7.5)
PH BLDA: 7.37 [PH] (ref 7.4–7.5)
PH BLDA: 7.37 [PH] (ref 7.4–7.5)
PH BLDA: 7.39 [PH] (ref 7.4–7.5)
PH BLDA: 7.4 [PH] (ref 7.4–7.5)
PH BLDA: 7.41 [PH] (ref 7.4–7.5)
PH BLDA: 7.42 [PH] (ref 7.4–7.5)
PH BLDA: 7.48 [PH] (ref 7.4–7.5)
PH BLDV: 7.35 [PH] (ref 7.31–7.45)
PH BLDV: 7.36 [PH] (ref 7.31–7.45)
PH TEMP ADJ BLDA: 7.37 [PH] (ref 7.4–7.5)
PH TEMP ADJ BLDA: 7.38 [PH] (ref 7.4–7.5)
PH TEMP ADJ BLDA: 7.4 [PH] (ref 7.4–7.5)
PH TEMP ADJ BLDA: 7.41 [PH] (ref 7.4–7.5)
PH TEMP ADJ BLDA: 7.42 [PH] (ref 7.4–7.5)
PH TEMP ADJ BLDA: 7.42 [PH] (ref 7.4–7.5)
PH TEMP ADJ BLDA: 7.44 [PH] (ref 7.4–7.5)
PH TEMP ADJ BLDA: 7.5 [PH] (ref 7.4–7.5)
PH TEMP ADJ BLDV: 7.37 [PH] (ref 7.31–7.45)
PH TEMP ADJ BLDV: 7.39 [PH] (ref 7.31–7.45)
PLATELET # BLD AUTO: 166 K/UL (ref 164–446)
PLATELET # BLD AUTO: 241 K/UL (ref 164–446)
PMV BLD AUTO: 10.7 FL (ref 9–12.9)
PO2 BLDA: 143 MMHG (ref 64–87)
PO2 BLDA: 305 MMHG (ref 64–87)
PO2 BLDA: 331 MMHG (ref 64–87)
PO2 BLDA: 368 MMHG (ref 64–87)
PO2 BLDA: 371 MMHG (ref 64–87)
PO2 BLDA: 380 MMHG (ref 64–87)
PO2 BLDA: 75 MMHG (ref 64–87)
PO2 BLDA: 99 MMHG (ref 64–87)
PO2 BLDV: 44 MMHG (ref 25–40)
PO2 BLDV: 46 MMHG (ref 25–40)
PO2 TEMP ADJ BLDA: 131 MMHG (ref 64–87)
PO2 TEMP ADJ BLDA: 294 MMHG (ref 64–87)
PO2 TEMP ADJ BLDA: 327 MMHG (ref 64–87)
PO2 TEMP ADJ BLDA: 356 MMHG (ref 64–87)
PO2 TEMP ADJ BLDA: 366 MMHG (ref 64–87)
PO2 TEMP ADJ BLDA: 380 MMHG (ref 64–87)
PO2 TEMP ADJ BLDA: 73 MMHG (ref 64–87)
PO2 TEMP ADJ BLDA: 91 MMHG (ref 64–87)
PO2 TEMP ADJ BLDV: 38 MMHG (ref 25–40)
PO2 TEMP ADJ BLDV: 40 MMHG (ref 25–40)
POTASSIUM BLD-SCNC: 3.6 MMOL/L (ref 3.6–5.5)
POTASSIUM BLD-SCNC: 4 MMOL/L (ref 3.6–5.5)
POTASSIUM BLD-SCNC: 4.1 MMOL/L (ref 3.6–5.5)
POTASSIUM BLD-SCNC: 4.2 MMOL/L (ref 3.6–5.5)
POTASSIUM BLD-SCNC: 4.5 MMOL/L (ref 3.6–5.5)
POTASSIUM BLD-SCNC: 4.5 MMOL/L (ref 3.6–5.5)
POTASSIUM BLD-SCNC: 5 MMOL/L (ref 3.6–5.5)
POTASSIUM BLD-SCNC: 5 MMOL/L (ref 3.6–5.5)
POTASSIUM SERPL-SCNC: 3.3 MMOL/L (ref 3.6–5.5)
POTASSIUM SERPL-SCNC: 4.4 MMOL/L (ref 3.6–5.5)
POTASSIUM SERPL-SCNC: 4.5 MMOL/L (ref 3.6–5.5)
POTASSIUM SERPL-SCNC: 4.7 MMOL/L (ref 3.6–5.5)
PROT SERPL-MCNC: 5 G/DL (ref 6–8.2)
PROTHROMBIN TIME: 13.4 SEC (ref 12–14.6)
PROTHROMBIN TIME: 17.8 SEC (ref 12–14.6)
RBC # BLD AUTO: 6.13 M/UL (ref 4.7–6.1)
SAO2 % BLDA: 100 % (ref 93–99)
SAO2 % BLDA: 95 % (ref 93–99)
SAO2 % BLDA: 98 % (ref 93–99)
SAO2 % BLDA: 99 % (ref 93–99)
SAO2 % BLDV: 77 %
SAO2 % BLDV: 80 %
SODIUM BLD-SCNC: 138 MMOL/L (ref 135–145)
SODIUM BLD-SCNC: 139 MMOL/L (ref 135–145)
SODIUM BLD-SCNC: 139 MMOL/L (ref 135–145)
SODIUM BLD-SCNC: 140 MMOL/L (ref 135–145)
SODIUM BLD-SCNC: 141 MMOL/L (ref 135–145)
SODIUM BLD-SCNC: 142 MMOL/L (ref 135–145)
SODIUM SERPL-SCNC: 144 MMOL/L (ref 135–145)
SPECIMEN DRAWN FROM PATIENT: ABNORMAL
WBC # BLD AUTO: 8.3 K/UL (ref 4.8–10.8)

## 2024-02-27 PROCEDURE — 770022 HCHG ROOM/CARE - ICU (200)

## 2024-02-27 PROCEDURE — 82803 BLOOD GASES ANY COMBINATION: CPT | Mod: 91

## 2024-02-27 PROCEDURE — 85018 HEMOGLOBIN: CPT

## 2024-02-27 PROCEDURE — 33411 REPLACEMENT OF AORTIC VALVE: CPT | Mod: AS | Performed by: NURSE PRACTITIONER

## 2024-02-27 PROCEDURE — 700102 HCHG RX REV CODE 250 W/ 637 OVERRIDE(OP): Performed by: INTERNAL MEDICINE

## 2024-02-27 PROCEDURE — A9270 NON-COVERED ITEM OR SERVICE: HCPCS | Performed by: NURSE PRACTITIONER

## 2024-02-27 PROCEDURE — 02UX0JZ SUPPLEMENT THORACIC AORTA, ASCENDING/ARCH WITH SYNTHETIC SUBSTITUTE, OPEN APPROACH: ICD-10-PCS | Performed by: THORACIC SURGERY (CARDIOTHORACIC VASCULAR SURGERY)

## 2024-02-27 PROCEDURE — C1781 MESH (IMPLANTABLE): HCPCS | Performed by: THORACIC SURGERY (CARDIOTHORACIC VASCULAR SURGERY)

## 2024-02-27 PROCEDURE — 700101 HCHG RX REV CODE 250

## 2024-02-27 PROCEDURE — 160009 HCHG ANES TIME/MIN: Performed by: THORACIC SURGERY (CARDIOTHORACIC VASCULAR SURGERY)

## 2024-02-27 PROCEDURE — 93325 DOPPLER ECHO COLOR FLOW MAPG: CPT

## 2024-02-27 PROCEDURE — 84132 ASSAY OF SERUM POTASSIUM: CPT | Mod: 91

## 2024-02-27 PROCEDURE — C1729 CATH, DRAINAGE: HCPCS | Performed by: THORACIC SURGERY (CARDIOTHORACIC VASCULAR SURGERY)

## 2024-02-27 PROCEDURE — 83735 ASSAY OF MAGNESIUM: CPT

## 2024-02-27 PROCEDURE — 700102 HCHG RX REV CODE 250 W/ 637 OVERRIDE(OP): Performed by: NURSE PRACTITIONER

## 2024-02-27 PROCEDURE — 33411 REPLACEMENT OF AORTIC VALVE: CPT | Performed by: THORACIC SURGERY (CARDIOTHORACIC VASCULAR SURGERY)

## 2024-02-27 PROCEDURE — 71045 X-RAY EXAM CHEST 1 VIEW: CPT

## 2024-02-27 PROCEDURE — 700111 HCHG RX REV CODE 636 W/ 250 OVERRIDE (IP): Performed by: THORACIC SURGERY (CARDIOTHORACIC VASCULAR SURGERY)

## 2024-02-27 PROCEDURE — 700101 HCHG RX REV CODE 250: Performed by: ANESTHESIOLOGY

## 2024-02-27 PROCEDURE — 88305 TISSUE EXAM BY PATHOLOGIST: CPT

## 2024-02-27 PROCEDURE — 94799 UNLISTED PULMONARY SVC/PX: CPT

## 2024-02-27 PROCEDURE — 82962 GLUCOSE BLOOD TEST: CPT | Mod: 91

## 2024-02-27 PROCEDURE — 160048 HCHG OR STATISTICAL LEVEL 1-5: Performed by: THORACIC SURGERY (CARDIOTHORACIC VASCULAR SURGERY)

## 2024-02-27 PROCEDURE — 93005 ELECTROCARDIOGRAM TRACING: CPT | Performed by: NURSE PRACTITIONER

## 2024-02-27 PROCEDURE — 700105 HCHG RX REV CODE 258: Performed by: NURSE PRACTITIONER

## 2024-02-27 PROCEDURE — 02RF0JZ REPLACEMENT OF AORTIC VALVE WITH SYNTHETIC SUBSTITUTE, OPEN APPROACH: ICD-10-PCS | Performed by: THORACIC SURGERY (CARDIOTHORACIC VASCULAR SURGERY)

## 2024-02-27 PROCEDURE — C1751 CATH, INF, PER/CENT/MIDLINE: HCPCS | Performed by: THORACIC SURGERY (CARDIOTHORACIC VASCULAR SURGERY)

## 2024-02-27 PROCEDURE — 5A1935Z RESPIRATORY VENTILATION, LESS THAN 24 CONSECUTIVE HOURS: ICD-10-PCS | Performed by: THORACIC SURGERY (CARDIOTHORACIC VASCULAR SURGERY)

## 2024-02-27 PROCEDURE — C1889 IMPLANT/INSERT DEVICE, NOC: HCPCS | Performed by: THORACIC SURGERY (CARDIOTHORACIC VASCULAR SURGERY)

## 2024-02-27 PROCEDURE — 700101 HCHG RX REV CODE 250: Performed by: NURSE PRACTITIONER

## 2024-02-27 PROCEDURE — 93010 ELECTROCARDIOGRAM REPORT: CPT | Performed by: INTERNAL MEDICINE

## 2024-02-27 PROCEDURE — 85730 THROMBOPLASTIN TIME PARTIAL: CPT

## 2024-02-27 PROCEDURE — 80053 COMPREHEN METABOLIC PANEL: CPT

## 2024-02-27 PROCEDURE — 83605 ASSAY OF LACTIC ACID: CPT | Mod: 91

## 2024-02-27 PROCEDURE — 85049 AUTOMATED PLATELET COUNT: CPT

## 2024-02-27 PROCEDURE — 700105 HCHG RX REV CODE 258: Performed by: ANESTHESIOLOGY

## 2024-02-27 PROCEDURE — 160042 HCHG SURGERY MINUTES - EA ADDL 1 MIN LEVEL 5: Performed by: THORACIC SURGERY (CARDIOTHORACIC VASCULAR SURGERY)

## 2024-02-27 PROCEDURE — 5A1221Z PERFORMANCE OF CARDIAC OUTPUT, CONTINUOUS: ICD-10-PCS | Performed by: THORACIC SURGERY (CARDIOTHORACIC VASCULAR SURGERY)

## 2024-02-27 PROCEDURE — 99291 CRITICAL CARE FIRST HOUR: CPT | Performed by: STUDENT IN AN ORGANIZED HEALTH CARE EDUCATION/TRAINING PROGRAM

## 2024-02-27 PROCEDURE — 85610 PROTHROMBIN TIME: CPT

## 2024-02-27 PROCEDURE — C1768 GRAFT, VASCULAR: HCPCS | Performed by: THORACIC SURGERY (CARDIOTHORACIC VASCULAR SURGERY)

## 2024-02-27 PROCEDURE — 94150 VITAL CAPACITY TEST: CPT

## 2024-02-27 PROCEDURE — 85347 COAGULATION TIME ACTIVATED: CPT

## 2024-02-27 PROCEDURE — 503001 HCHG PERFUSION: Performed by: THORACIC SURGERY (CARDIOTHORACIC VASCULAR SURGERY)

## 2024-02-27 PROCEDURE — 84295 ASSAY OF SERUM SODIUM: CPT

## 2024-02-27 PROCEDURE — 85027 COMPLETE CBC AUTOMATED: CPT

## 2024-02-27 PROCEDURE — B24BZZ4 ULTRASONOGRAPHY OF HEART WITH AORTA, TRANSESOPHAGEAL: ICD-10-PCS | Performed by: THORACIC SURGERY (CARDIOTHORACIC VASCULAR SURGERY)

## 2024-02-27 PROCEDURE — 700111 HCHG RX REV CODE 636 W/ 250 OVERRIDE (IP): Performed by: NURSE PRACTITIONER

## 2024-02-27 PROCEDURE — 160031 HCHG SURGERY MINUTES - 1ST 30 MINS LEVEL 5: Performed by: THORACIC SURGERY (CARDIOTHORACIC VASCULAR SURGERY)

## 2024-02-27 PROCEDURE — 88311 DECALCIFY TISSUE: CPT

## 2024-02-27 PROCEDURE — A9270 NON-COVERED ITEM OR SERVICE: HCPCS | Performed by: INTERNAL MEDICINE

## 2024-02-27 PROCEDURE — 94002 VENT MGMT INPAT INIT DAY: CPT

## 2024-02-27 PROCEDURE — 85014 HEMATOCRIT: CPT | Mod: 91

## 2024-02-27 PROCEDURE — 700111 HCHG RX REV CODE 636 W/ 250 OVERRIDE (IP)

## 2024-02-27 PROCEDURE — 700105 HCHG RX REV CODE 258

## 2024-02-27 PROCEDURE — 82330 ASSAY OF CALCIUM: CPT | Mod: 91

## 2024-02-27 PROCEDURE — C1898 LEAD, PMKR, OTHER THAN TRANS: HCPCS | Performed by: THORACIC SURGERY (CARDIOTHORACIC VASCULAR SURGERY)

## 2024-02-27 PROCEDURE — 700111 HCHG RX REV CODE 636 W/ 250 OVERRIDE (IP): Performed by: ANESTHESIOLOGY

## 2024-02-27 PROCEDURE — P9047 ALBUMIN (HUMAN), 25%, 50ML: HCPCS

## 2024-02-27 PROCEDURE — C1894 INTRO/SHEATH, NON-LASER: HCPCS | Performed by: THORACIC SURGERY (CARDIOTHORACIC VASCULAR SURGERY)

## 2024-02-27 PROCEDURE — P9045 ALBUMIN (HUMAN), 5%, 250 ML: HCPCS | Mod: JZ | Performed by: ANESTHESIOLOGY

## 2024-02-27 DEVICE — IMPLANTABLE DEVICE: Type: IMPLANTABLE DEVICE | Site: CHEST | Status: FUNCTIONAL

## 2024-02-27 DEVICE — PATCH 2X9CM XENOSURE BIOLOGIC VASCULAR---ORDER IN MULTIPLES OF 5---: Type: IMPLANTABLE DEVICE | Site: CHEST | Status: FUNCTIONAL

## 2024-02-27 RX ORDER — PROCHLORPERAZINE EDISYLATE 5 MG/ML
10 INJECTION INTRAMUSCULAR; INTRAVENOUS EVERY 6 HOURS PRN
Status: DISCONTINUED | OUTPATIENT
Start: 2024-02-27 | End: 2024-03-03 | Stop reason: HOSPADM

## 2024-02-27 RX ORDER — POTASSIUM CHLORIDE 7.45 MG/ML
10 INJECTION INTRAVENOUS ONCE
Status: COMPLETED | OUTPATIENT
Start: 2024-02-27 | End: 2024-02-27

## 2024-02-27 RX ORDER — AMOXICILLIN 250 MG
1 CAPSULE ORAL NIGHTLY
Status: DISCONTINUED | OUTPATIENT
Start: 2024-02-27 | End: 2024-03-03 | Stop reason: HOSPADM

## 2024-02-27 RX ORDER — ONDANSETRON 2 MG/ML
8 INJECTION INTRAMUSCULAR; INTRAVENOUS EVERY 6 HOURS PRN
Status: DISCONTINUED | OUTPATIENT
Start: 2024-02-27 | End: 2024-03-03 | Stop reason: HOSPADM

## 2024-02-27 RX ORDER — POLYETHYLENE GLYCOL 3350 17 G/17G
1 POWDER, FOR SOLUTION ORAL 2 TIMES DAILY PRN
Status: DISCONTINUED | OUTPATIENT
Start: 2024-02-27 | End: 2024-03-03 | Stop reason: HOSPADM

## 2024-02-27 RX ORDER — PHENYLEPHRINE HCL IN 0.9% NACL 0.5 MG/5ML
SYRINGE (ML) INTRAVENOUS PRN
Status: DISCONTINUED | OUTPATIENT
Start: 2024-02-27 | End: 2024-02-27 | Stop reason: SURG

## 2024-02-27 RX ORDER — ACETAMINOPHEN 500 MG
1000 TABLET ORAL EVERY 6 HOURS
Qty: 80 TABLET | Refills: 0 | Status: DISCONTINUED | OUTPATIENT
Start: 2024-02-27 | End: 2024-03-03 | Stop reason: HOSPADM

## 2024-02-27 RX ORDER — LIDOCAINE HYDROCHLORIDE 20 MG/ML
INJECTION, SOLUTION EPIDURAL; INFILTRATION; INTRACAUDAL; PERINEURAL PRN
Status: DISCONTINUED | OUTPATIENT
Start: 2024-02-27 | End: 2024-02-27 | Stop reason: SURG

## 2024-02-27 RX ORDER — ASPIRIN 81 MG/1
81 TABLET ORAL DAILY
Status: DISCONTINUED | OUTPATIENT
Start: 2024-02-28 | End: 2024-03-03 | Stop reason: HOSPADM

## 2024-02-27 RX ORDER — BISACODYL 10 MG
10 SUPPOSITORY, RECTAL RECTAL
Status: DISCONTINUED | OUTPATIENT
Start: 2024-02-27 | End: 2024-03-03 | Stop reason: HOSPADM

## 2024-02-27 RX ORDER — OXYCODONE HYDROCHLORIDE 5 MG/1
5 TABLET ORAL
Status: DISCONTINUED | OUTPATIENT
Start: 2024-02-27 | End: 2024-03-03 | Stop reason: HOSPADM

## 2024-02-27 RX ORDER — OMEPRAZOLE 20 MG/1
20 CAPSULE, DELAYED RELEASE ORAL DAILY
Status: DISCONTINUED | OUTPATIENT
Start: 2024-02-28 | End: 2024-03-03 | Stop reason: HOSPADM

## 2024-02-27 RX ORDER — POTASSIUM CHLORIDE 20 MEQ/1
40 TABLET, EXTENDED RELEASE ORAL DAILY
Status: DISCONTINUED | OUTPATIENT
Start: 2024-02-27 | End: 2024-02-27

## 2024-02-27 RX ORDER — ENEMA 19; 7 G/133ML; G/133ML
1 ENEMA RECTAL
Status: DISCONTINUED | OUTPATIENT
Start: 2024-02-27 | End: 2024-03-03 | Stop reason: HOSPADM

## 2024-02-27 RX ORDER — ACETAMINOPHEN 500 MG
1000 TABLET ORAL ONCE
Status: COMPLETED | OUTPATIENT
Start: 2024-02-27 | End: 2024-02-27

## 2024-02-27 RX ORDER — DEXMEDETOMIDINE HYDROCHLORIDE 4 UG/ML
0-1.5 INJECTION, SOLUTION INTRAVENOUS CONTINUOUS
Status: DISCONTINUED | OUTPATIENT
Start: 2024-02-27 | End: 2024-02-29

## 2024-02-27 RX ORDER — AMOXICILLIN 250 MG
1 CAPSULE ORAL
Status: DISCONTINUED | OUTPATIENT
Start: 2024-02-27 | End: 2024-03-03 | Stop reason: HOSPADM

## 2024-02-27 RX ORDER — MORPHINE SULFATE 4 MG/ML
4 INJECTION INTRAVENOUS
Status: DISCONTINUED | OUTPATIENT
Start: 2024-02-27 | End: 2024-02-29

## 2024-02-27 RX ORDER — EPINEPHRINE HCL IN 0.9 % NACL 4MG/250ML
0-.5 PLASTIC BAG, INJECTION (ML) INTRAVENOUS CONTINUOUS
Status: DISCONTINUED | OUTPATIENT
Start: 2024-02-27 | End: 2024-02-29

## 2024-02-27 RX ORDER — SODIUM CHLORIDE 9 MG/ML
INJECTION, SOLUTION INTRAVENOUS CONTINUOUS
Status: DISCONTINUED | OUTPATIENT
Start: 2024-02-27 | End: 2024-02-28

## 2024-02-27 RX ORDER — TRAMADOL HYDROCHLORIDE 50 MG/1
50 TABLET ORAL EVERY 4 HOURS PRN
Status: DISCONTINUED | OUTPATIENT
Start: 2024-02-27 | End: 2024-03-03 | Stop reason: HOSPADM

## 2024-02-27 RX ORDER — NITROGLYCERIN 20 MG/100ML
0-100 INJECTION INTRAVENOUS CONTINUOUS
Status: DISCONTINUED | OUTPATIENT
Start: 2024-02-27 | End: 2024-02-29

## 2024-02-27 RX ORDER — ACETAMINOPHEN 325 MG/1
650 TABLET ORAL EVERY 4 HOURS PRN
Status: DISCONTINUED | OUTPATIENT
Start: 2024-02-27 | End: 2024-03-03 | Stop reason: HOSPADM

## 2024-02-27 RX ORDER — MIDAZOLAM HYDROCHLORIDE 1 MG/ML
2 INJECTION INTRAMUSCULAR; INTRAVENOUS
Status: DISCONTINUED | OUTPATIENT
Start: 2024-02-27 | End: 2024-02-29

## 2024-02-27 RX ORDER — SODIUM CHLORIDE, SODIUM LACTATE, POTASSIUM CHLORIDE, CALCIUM CHLORIDE 600; 310; 30; 20 MG/100ML; MG/100ML; MG/100ML; MG/100ML
INJECTION, SOLUTION INTRAVENOUS
Status: DISCONTINUED | OUTPATIENT
Start: 2024-02-27 | End: 2024-02-27 | Stop reason: SURG

## 2024-02-27 RX ORDER — OXYCODONE HYDROCHLORIDE 10 MG/1
10 TABLET ORAL
Status: DISCONTINUED | OUTPATIENT
Start: 2024-02-27 | End: 2024-03-03 | Stop reason: HOSPADM

## 2024-02-27 RX ORDER — MAGNESIUM SULFATE 1 G/100ML
1 INJECTION INTRAVENOUS DAILY
Status: COMPLETED | OUTPATIENT
Start: 2024-02-27 | End: 2024-02-29

## 2024-02-27 RX ORDER — ACETAMINOPHEN 650 MG/1
650 SUPPOSITORY RECTAL EVERY 4 HOURS PRN
Status: DISCONTINUED | OUTPATIENT
Start: 2024-02-27 | End: 2024-03-03 | Stop reason: HOSPADM

## 2024-02-27 RX ORDER — ROCURONIUM BROMIDE 10 MG/ML
INJECTION, SOLUTION INTRAVENOUS PRN
Status: DISCONTINUED | OUTPATIENT
Start: 2024-02-27 | End: 2024-02-27 | Stop reason: SURG

## 2024-02-27 RX ORDER — MIDAZOLAM HYDROCHLORIDE 1 MG/ML
INJECTION INTRAMUSCULAR; INTRAVENOUS PRN
Status: DISCONTINUED | OUTPATIENT
Start: 2024-02-27 | End: 2024-02-27 | Stop reason: SURG

## 2024-02-27 RX ORDER — SODIUM CHLORIDE, SODIUM GLUCONATE, SODIUM ACETATE, POTASSIUM CHLORIDE AND MAGNESIUM CHLORIDE 526; 502; 368; 37; 30 MG/100ML; MG/100ML; MG/100ML; MG/100ML; MG/100ML
INJECTION, SOLUTION INTRAVENOUS PRN
Status: DISCONTINUED | OUTPATIENT
Start: 2024-02-27 | End: 2024-03-03 | Stop reason: HOSPADM

## 2024-02-27 RX ORDER — ACETAMINOPHEN 500 MG
1000 TABLET ORAL EVERY 6 HOURS PRN
Status: DISCONTINUED | OUTPATIENT
Start: 2024-03-08 | End: 2024-03-03 | Stop reason: HOSPADM

## 2024-02-27 RX ORDER — INSULIN LISPRO 100 [IU]/ML
0-14 INJECTION, SOLUTION INTRAVENOUS; SUBCUTANEOUS
Qty: 9 ML | Refills: 0 | Status: DISCONTINUED | OUTPATIENT
Start: 2024-02-27 | End: 2024-02-28

## 2024-02-27 RX ORDER — CEFAZOLIN SODIUM 1 G/3ML
INJECTION, POWDER, FOR SOLUTION INTRAMUSCULAR; INTRAVENOUS PRN
Status: DISCONTINUED | OUTPATIENT
Start: 2024-02-27 | End: 2024-02-27 | Stop reason: SURG

## 2024-02-27 RX ORDER — DIVALPROEX SODIUM 500 MG/1
500 TABLET, EXTENDED RELEASE ORAL
Status: DISCONTINUED | OUTPATIENT
Start: 2024-02-27 | End: 2024-03-03 | Stop reason: HOSPADM

## 2024-02-27 RX ORDER — SODIUM CHLORIDE 9 MG/ML
INJECTION, SOLUTION INTRAVENOUS CONTINUOUS
Status: DISCONTINUED | OUTPATIENT
Start: 2024-02-27 | End: 2024-02-29

## 2024-02-27 RX ORDER — HEPARIN SODIUM,PORCINE 1000/ML
VIAL (ML) INJECTION PRN
Status: DISCONTINUED | OUTPATIENT
Start: 2024-02-27 | End: 2024-02-27 | Stop reason: SURG

## 2024-02-27 RX ORDER — DOCUSATE SODIUM 100 MG/1
100 CAPSULE, LIQUID FILLED ORAL 2 TIMES DAILY
Status: DISCONTINUED | OUTPATIENT
Start: 2024-02-27 | End: 2024-03-03 | Stop reason: HOSPADM

## 2024-02-27 RX ORDER — NOREPINEPHRINE BITARTRATE 0.03 MG/ML
0-1 INJECTION, SOLUTION INTRAVENOUS CONTINUOUS
Status: DISCONTINUED | OUTPATIENT
Start: 2024-02-27 | End: 2024-02-29

## 2024-02-27 RX ORDER — ALUMINA, MAGNESIA, AND SIMETHICONE 2400; 2400; 240 MG/30ML; MG/30ML; MG/30ML
30 SUSPENSION ORAL EVERY 4 HOURS PRN
Status: DISCONTINUED | OUTPATIENT
Start: 2024-02-27 | End: 2024-03-03 | Stop reason: HOSPADM

## 2024-02-27 RX ORDER — SODIUM CHLORIDE 9 MG/ML
INJECTION, SOLUTION INTRAVENOUS
Status: DISCONTINUED | OUTPATIENT
Start: 2024-02-27 | End: 2024-02-27 | Stop reason: SURG

## 2024-02-27 RX ORDER — ENOXAPARIN SODIUM 100 MG/ML
40 INJECTION SUBCUTANEOUS DAILY
Status: DISCONTINUED | OUTPATIENT
Start: 2024-02-28 | End: 2024-03-03

## 2024-02-27 RX ORDER — PROTAMINE SULFATE 10 MG/ML
INJECTION, SOLUTION INTRAVENOUS PRN
Status: DISCONTINUED | OUTPATIENT
Start: 2024-02-27 | End: 2024-02-27 | Stop reason: SURG

## 2024-02-27 RX ORDER — ALBUMIN, HUMAN INJ 5% 5 %
SOLUTION INTRAVENOUS PRN
Status: DISCONTINUED | OUTPATIENT
Start: 2024-02-27 | End: 2024-02-27 | Stop reason: SURG

## 2024-02-27 RX ORDER — SODIUM CHLORIDE, SODIUM GLUCONATE, SODIUM ACETATE, POTASSIUM CHLORIDE AND MAGNESIUM CHLORIDE 526; 502; 368; 37; 30 MG/100ML; MG/100ML; MG/100ML; MG/100ML; MG/100ML
INJECTION, SOLUTION INTRAVENOUS
Status: DISCONTINUED | OUTPATIENT
Start: 2024-02-27 | End: 2024-02-27 | Stop reason: SURG

## 2024-02-27 RX ORDER — DEXTROSE MONOHYDRATE 25 G/50ML
12.5-25 INJECTION, SOLUTION INTRAVENOUS PRN
Status: DISCONTINUED | OUTPATIENT
Start: 2024-02-27 | End: 2024-02-28

## 2024-02-27 RX ADMIN — AMINOCAPROIC ACID 2 G/HR: 250 INJECTION, SOLUTION INTRAVENOUS at 08:57

## 2024-02-27 RX ADMIN — DIVALPROEX SODIUM 500 MG: 500 TABLET, EXTENDED RELEASE ORAL at 20:09

## 2024-02-27 RX ADMIN — SODIUM CHLORIDE: 9 INJECTION, SOLUTION INTRAVENOUS at 12:32

## 2024-02-27 RX ADMIN — Medication 200 MCG: at 10:42

## 2024-02-27 RX ADMIN — Medication 100 MCG: at 11:20

## 2024-02-27 RX ADMIN — DOCUSATE SODIUM 50 MG AND SENNOSIDES 8.6 MG 1 TABLET: 8.6; 5 TABLET, FILM COATED ORAL at 20:09

## 2024-02-27 RX ADMIN — SODIUM CHLORIDE: 9 INJECTION, SOLUTION INTRAVENOUS at 12:33

## 2024-02-27 RX ADMIN — METOPROLOL TARTRATE 12.5 MG: 25 TABLET, FILM COATED ORAL at 06:24

## 2024-02-27 RX ADMIN — LIDOCAINE HYDROCHLORIDE 100 MG: 20 INJECTION, SOLUTION EPIDURAL; INFILTRATION; INTRACAUDAL at 07:31

## 2024-02-27 RX ADMIN — ROCURONIUM BROMIDE 100 MG: 50 INJECTION, SOLUTION INTRAVENOUS at 07:31

## 2024-02-27 RX ADMIN — METHADONE HYDROCHLORIDE 10 MG: 10 INJECTION, SOLUTION INTRAMUSCULAR; INTRAVENOUS; SUBCUTANEOUS at 08:08

## 2024-02-27 RX ADMIN — ALBUMIN (HUMAN) 12.5 G: 12.5 SOLUTION INTRAVENOUS at 11:35

## 2024-02-27 RX ADMIN — TRAMADOL HYDROCHLORIDE 50 MG: 50 TABLET ORAL at 17:46

## 2024-02-27 RX ADMIN — OXYCODONE HYDROCHLORIDE 10 MG: 10 TABLET ORAL at 16:19

## 2024-02-27 RX ADMIN — POTASSIUM CHLORIDE 10 MEQ: 7.46 INJECTION, SOLUTION INTRAVENOUS at 13:06

## 2024-02-27 RX ADMIN — Medication 50 MG: at 07:31

## 2024-02-27 RX ADMIN — Medication 1 APPLICATOR: at 20:09

## 2024-02-27 RX ADMIN — HEPARIN SODIUM 41000 UNITS: 1000 INJECTION, SOLUTION INTRAVENOUS; SUBCUTANEOUS at 08:27

## 2024-02-27 RX ADMIN — SODIUM CHLORIDE 1 UNITS/HR: 9 INJECTION, SOLUTION INTRAVENOUS at 09:46

## 2024-02-27 RX ADMIN — VANCOMYCIN HYDROCHLORIDE 1500 MG: 1 INJECTION, POWDER, LYOPHILIZED, FOR SOLUTION INTRAVENOUS at 07:50

## 2024-02-27 RX ADMIN — DOCUSATE SODIUM 100 MG: 100 CAPSULE, LIQUID FILLED ORAL at 17:46

## 2024-02-27 RX ADMIN — OXYCODONE 5 MG: 5 TABLET ORAL at 20:08

## 2024-02-27 RX ADMIN — SODIUM CHLORIDE: 9 INJECTION, SOLUTION INTRAVENOUS at 07:45

## 2024-02-27 RX ADMIN — AMINOCAPROIC ACID 10 G: 250 INJECTION, SOLUTION INTRAVENOUS at 08:27

## 2024-02-27 RX ADMIN — Medication 100 MCG: at 10:51

## 2024-02-27 RX ADMIN — NOREPINEPHRINE BITARTRATE 0.1 MCG/KG/MIN: 1 INJECTION, SOLUTION, CONCENTRATE INTRAVENOUS at 07:32

## 2024-02-27 RX ADMIN — Medication 200 MCG: at 11:11

## 2024-02-27 RX ADMIN — Medication 200 MCG: at 10:39

## 2024-02-27 RX ADMIN — DEXMEDETOMIDINE HYDROCHLORIDE 0.5 MCG/KG/HR: 100 INJECTION, SOLUTION INTRAVENOUS at 10:48

## 2024-02-27 RX ADMIN — SODIUM CHLORIDE, SODIUM GLUCONATE, SODIUM ACETATE, POTASSIUM CHLORIDE AND MAGNESIUM CHLORIDE: 526; 502; 368; 37; 30 INJECTION, SOLUTION INTRAVENOUS at 07:45

## 2024-02-27 RX ADMIN — Medication 200 MCG: at 11:01

## 2024-02-27 RX ADMIN — Medication 200 MCG: at 11:37

## 2024-02-27 RX ADMIN — Medication 50 MG: at 08:08

## 2024-02-27 RX ADMIN — PROTAMINE SULFATE 350 MG: 10 INJECTION, SOLUTION INTRAVENOUS at 10:42

## 2024-02-27 RX ADMIN — CEFAZOLIN 2 G: 1 INJECTION, POWDER, FOR SOLUTION INTRAMUSCULAR; INTRAVENOUS at 08:08

## 2024-02-27 RX ADMIN — POTASSIUM CHLORIDE 40 MEQ: 1500 TABLET, EXTENDED RELEASE ORAL at 05:55

## 2024-02-27 RX ADMIN — Medication 200 MCG: at 10:46

## 2024-02-27 RX ADMIN — Medication 100 MCG: at 10:59

## 2024-02-27 RX ADMIN — ACETAMINOPHEN 1000 MG: 325 TABLET, FILM COATED ORAL at 17:45

## 2024-02-27 RX ADMIN — SODIUM CHLORIDE, SODIUM GLUCONATE, SODIUM ACETATE, POTASSIUM CHLORIDE AND MAGNESIUM CHLORIDE: 526; 502; 368; 37; 30 INJECTION, SOLUTION INTRAVENOUS at 14:00

## 2024-02-27 RX ADMIN — METHADONE HYDROCHLORIDE 10 MG: 10 INJECTION, SOLUTION INTRAMUSCULAR; INTRAVENOUS; SUBCUTANEOUS at 07:29

## 2024-02-27 RX ADMIN — VANCOMYCIN HYDROCHLORIDE 1500 MG: 5 INJECTION, POWDER, LYOPHILIZED, FOR SOLUTION INTRAVENOUS at 20:23

## 2024-02-27 RX ADMIN — SODIUM CHLORIDE, POTASSIUM CHLORIDE, SODIUM LACTATE AND CALCIUM CHLORIDE: 600; 310; 30; 20 INJECTION, SOLUTION INTRAVENOUS at 07:23

## 2024-02-27 RX ADMIN — MAGNESIUM SULFATE IN DEXTROSE 1 G: 10 INJECTION, SOLUTION INTRAVENOUS at 12:53

## 2024-02-27 RX ADMIN — Medication 1 APPLICATOR: at 17:47

## 2024-02-27 RX ADMIN — MIDAZOLAM HYDROCHLORIDE 2 MG: 1 INJECTION, SOLUTION INTRAMUSCULAR; INTRAVENOUS at 07:29

## 2024-02-27 RX ADMIN — ACETAMINOPHEN 1000 MG: 500 TABLET ORAL at 06:24

## 2024-02-27 RX ADMIN — ROCURONIUM BROMIDE 50 MG: 50 INJECTION, SOLUTION INTRAVENOUS at 08:42

## 2024-02-27 RX ADMIN — ROCURONIUM BROMIDE 50 MG: 50 INJECTION, SOLUTION INTRAVENOUS at 10:14

## 2024-02-27 RX ADMIN — PROPOFOL 150 MG: 10 INJECTION, EMULSION INTRAVENOUS at 07:31

## 2024-02-27 ASSESSMENT — FIBROSIS 4 INDEX: FIB4 SCORE: 0.38

## 2024-02-27 ASSESSMENT — COPD QUESTIONNAIRES
DURING THE PAST 4 WEEKS HOW MUCH DID YOU FEEL SHORT OF BREATH: NONE/LITTLE OF THE TIME
DO YOU EVER COUGH UP ANY MUCUS OR PHLEGM?: NO/ONLY WITH OCCASIONAL COLDS OR INFECTIONS
COPD SCREENING SCORE: 0
HAVE YOU SMOKED AT LEAST 100 CIGARETTES IN YOUR ENTIRE LIFE: NO/DON'T KNOW

## 2024-02-27 ASSESSMENT — PAIN DESCRIPTION - PAIN TYPE
TYPE: SURGICAL PAIN
TYPE: ACUTE PAIN
TYPE: SURGICAL PAIN
TYPE: ACUTE PAIN
TYPE: SURGICAL PAIN

## 2024-02-27 ASSESSMENT — PULMONARY FUNCTION TESTS: FVC: 1

## 2024-02-27 NOTE — ASSESSMENT & PLAN NOTE
Procedure: Mechanical AVR (23mm On-X) and aortic root enlargement  Date of surgery: 2/27  Surgeon: Dr David    INR goal 1.5 - 2 with On-X valve  Start Heparin / coumadin tomorrow am assuming no bleeding    Off pressors  Small amount of chest tube output  Off insulin infusion  Extubated

## 2024-02-27 NOTE — ANESTHESIA PROCEDURE NOTES
Arterial Line    Performed by: Eric Lozano M.D.  Authorized by: Eric Lozano M.D.    Start Time:  2/27/2024 7:34 AM  Localization: ultrasound guidance  Image captured, interpreted and electronically stored.  Patient Location:  OR  Indication: continuous blood pressure monitoring and blood sampling needed        Catheter Size:  20 G  Seldinger Technique?: Yes    Laterality:  Right  Site:  Radial artery  Line Secured:  Antimicrobial disc, tape and transparent dressing  Events: patient tolerated procedure well with no complications     Placed with one attempt

## 2024-02-27 NOTE — FLOWSHEET NOTE
02/27/24 1440   Weaning Parameters   RR (bpm) 13   $ FVC / Vital Capacity (liters)  1   NIF (cm H2O)  -25   Rapid Shallow Breathing Index (RR/VT) 20   Spontaneous VE 7.5   Spontaneous

## 2024-02-27 NOTE — RESPIRATORY CARE
Extubation    Cuff leak noted: yes  Stridor present: no     O2 (LPM): 4 (02/27/24 1415)     Patient toleration: tolerating well    Events/Summary/Plan: Pt extubated, placed on 4L NC (02/27/24 1415)

## 2024-02-27 NOTE — OP REPORT
Operative Report    PreOp Diagnosis: Severe symptomatic aortic stenosis, bicuspid aortic valve, ascending aortic ectasia, seizure disorder, obesity, hypertension, dyslipidemia    PostOp Diagnosis: Same as above      Procedure(s):  ROOT ENLARGEMENT AORTA,  - Wound Class: Clean  REPLACEMENT, AORTIC VALVE WITH A 23 MM ON-X VALVE- Wound Class: Clean  ECHOCARDIOGRAM, TRANSESOPHAGEAL, INTRAOPERATIVE - Wound Class: Clean Contaminated      Surgeon(s):  Kevin David D.O.    Anesthesiologist/Type of Anesthesia:  Anesthesiologist: Eric Lozano M.D./General    Surgical Staff:  Assistant: MIGUEL JasmineRKATIE; Matty Lind P.A.-C.  Circulator: Bernardo Chang R.N.  Perfusionist: Chaz Eid  Scrub Person: Mala Shepard; Mala Cruz R.N.  First Assist: CHAKA Weeks    Specimens removed if any:  ID Type Source Tests Collected by Time Destination   A : Aortic Valve Tissue Heart Valve PATHOLOGY SPECIMEN Kevin David D.O. 2/27/2024 10:12 AM        Estimated Blood Loss: Cardiopulmonary bypass    Findings:     Pre and postoperative echo showed no regional wall motion abnormalities and normal ejection fraction.  Aortic valve was a true bicuspid, with 3 commissures.  Heavily calcified along the commissures and into the leaflets.  Cusp also are very thickened consistent with a rheumatic valve.    Sizing was very tight for 23 mm on X valve.  21 mm valve was given and patient prosthesis mismatch.  Root enlargement performed and 23 mm valve sat in nicely.  Mean gradient postoperatively was 7 mmHg with good leaflet motion.    Patient did not require defibrillation to resume a normal sinus rhythm, and  from bypass without issue    Complications: None immediate    Patient condition: Guarded to ICU    Chest tubes: Mediastinal: 32 Spanish x2    Pacing wires: RV x 2    Pericardium: Open    Cross-clamp time: 89 minutes    Pump time: 105 minutes    Cardioplegia: Cold blood antegrade  [place] cardioplegia given.  Initial induction with 1000 mL.  300 and mL of warm blood given as antegrade hotshot    Vent: Aortic root      Procedure:    After informed consent was obtained, the patient was brought to the operating room.  They were placed in supine position on the operating table.  General anesthesia was induced via endotracheal tube.  Lines, arterial line, Gerber were placed by the nursing and anesthesia teams.  They received pre-incision antibiotics and beta-blockade.  The patient was prepped in a sterile fashion from their chin to their feet.  Drapes were placed in a sterile fashion.  The procedure was begun with a timeout.    I was joined throughout the case by CHAPITO Betts.  She assisted in every aspect.  At the end of the case DARKE Upton prescription and in order to help with closing of the skin incision.    I made a sternotomy incision with a 10 blade scalpel. I deepened my sternal incision with left cautery to the sternum.  The sternum was divided in the midline using the sternal saw.  Hemostasis of the sternal edges was obtained using bone putty and electrocautery.  Sternal retractor was placed in the chest open.    With the chest open, I then dissected out the innominate vein and the pericardium.  I then opened the pericardium and inverted T fashion.  I created pericardial well, suspending the pericardial edges from the skin edges using interrupted 2-0 Ethibond sutures.  Inspection of the heart and the aorta revealed no obvious pathology.  Palpation of the aorta revealed no calcifications precluding cannulation or cross-clamp.  I cannulated the aorta using a direct Seldinger's technique and JOSE guidance.    Next, I placed the venous cannula into the IVC via the right atrium.  Again this was confirmed with JOSE.  Next, I then placed a pursestring on the mid ascending aorta, through which I placed my antegrade needle.      Inspection of the aorta revealed it to be mildly  ectatic, but not aneurysmal enough to justify resection.  Cardiopulmonary bypass was initiated.  The patient was cooled to 34 °C, and rewarming was begun once the valve was secured in place.  Once on full flow, the pulmonary artery was dissected away from the aorta facilitate cross-clamp placement as well as any necessary aortic procedures.  The aortic root particularly along the noncoronary sinus was also dissected down.  The cross-clamp was then applied and the heart arrested.  Ice slush was placed on the heart for further protection.    After arrest, the aorta was opened in a transverse oblique fashion.  The incision was directed down to the midportion of the noncoronary sinus.  Stay sutures were placed and the valve inspected.  It was clearly diseased, rheumatic appearing with heavy calcification.  It was resected and the annulus meticulously cleaned up with scissors and pituitary rongeurs.  At that point sizing was tight for 23 mm on X valve.  The barrel did pass into the LVOT, but the replica was extremely tight worrisome for potential complication.  Because of that, I decided to perform a root enlargement.  I carried my incision across the annulus down into the anterior mitral leaflet.  Using a 15 blade scalpel I was able to shave the dome of the left atrium away from the aortic wall preventing entry into the left atrium.  A bovine carotid patch was then selected and sutured in place with a running 4-0 Prolene suture.  Sizing then showed good fit for a 23 mm valve.  Annular sutures were placed circumferentially along the annulus.  I used 2 oh Ethibonds with 3 x 3 mm pledgets.  The replaced in a U-stitch fashion leaving the pledget in the LVOT.  At the patch, these were brought from outside in in order to facilitate good seating of the valve.  These were then passed through the sewing cuff of the valve which was lowered in place.  Inspection showed good clearance of the left and right coronary artery with good  seating on the annulus.  The core knot device was used to secure each suture.  Upon completion inspection of the left and right coronary ostium were again clear and there was good seating of the valve.    I proceeded with closure of the aortotomy.  I did this by trimming the carotid patch into a layton shape and incorporating that into the closure of the noncoronary sinus.  I then brought another 4-0 Prolene from the opposite side closing the rest of the aortotomy in 2 layers.    Patient was de-aired, flows dropped, and cross-clamp removed.  As the heart reperfused the placement of right ventricular pacing leads and my mediastinal chest tubes.  They were brought out through the epigastrium in the usual fashion.  Surgical sites were checked and found to be hemostatic.  The patient was then  from bypass in the usual fashion.  Venous cannula and aortic root vent were removed.  Protamine was administered to reverse systemic heparinization.  As the protamine was administered, the patient's blood volume in the pump was administered back to the aortic cannula.  Once that was given back, that cannula was removed.  Sites were oversewn as needed.  Hemostasis was verified to be good.  Proceeded with closure of the sternum using interrupted #5 sternal wires.  The wound was then irrigated and closed in layers using absorbable sutures.  The patient tolerated the procedure well, all instrument counts were correct x2, and he was taken to the ICU in guarded condition.          2/27/2024 11:21 AM Kevin David D.O.

## 2024-02-27 NOTE — ASSESSMENT & PLAN NOTE
Due to prior prolonged course of mechanical ventilation  8-0 ETT would not fit  7-0 ETT was easily passed

## 2024-02-27 NOTE — CARE PLAN
The patient is Watcher - Medium risk of patient condition declining or worsening    Shift Goals  Clinical Goals: Hemodunamic stabilty, safe recovery  Patient Goals: SANTIAGO  Family Goals: SANTIAGO    Progress made toward(s) clinical / shift goals:    Problem: Pain - Standard  Goal: Alleviation of pain or a reduction in pain to the patient’s comfort goal  2/27/2024 1417 by Rhea De Dios R.N.  Outcome: Progressing  2/27/2024 1417 by Rhea De Dios R.N.  Outcome: Progressing     Problem: Knowledge Deficit - Standard  Goal: Patient and family/care givers will demonstrate understanding of plan of care, disease process/condition, diagnostic tests and medications  2/27/2024 1417 by Rhea De Dios R.N.  Outcome: Progressing  2/27/2024 1417 by Rhea De Dios R.N.  Outcome: Progressing     Problem: Respiratory  Goal: Patient will achieve/maintain optimum respiratory ventilation and gas exchange  Outcome: Progressing     Problem: Hemodynamics  Goal: Patient's hemodynamics, fluid balance and neurologic status will be stable or improve  Outcome: Progressing     Problem: Day of surgery post CABG/Heart valve replacement  Goal: Stabilization in immediate post op period  Outcome: Progressing, off all vasopressors.   Intervention: VS q 15 min x 4 hours, then q 1 hour. Include temperature immediately upon arrival. Check CO/CI q 2-4 hours and PRN  Note: Completed, see flowsheet  Intervention: If radial artery used, elevate arm, no BP checks or needle sticks from affected arm, monitor ulnar pulse and capillary refill  Note: N/A  Intervention: First post op hour labs and EKG per order  Note: Completed  Intervention: Serum K q 6 hours x 24 hours.  ABG and CBC prn.  Note: Completed  Intervention: Initiate post cardiac insulin infusion protocol orders for FSBS greater than 140 and check frequency per protocol  Note: Completed  Intervention: FSBS frequency as per Cardiac Surgery Insulin Drip Protocol  Note: Completed, see MAR  Intervention: For  patients on Beta Blockers: verify dose given prior to surgery or within 6 hours after arrival to the unit  Note: Given per NOC RN   Intervention: Chest tube to 20 cm suction, record CT drainage with VS, and check for air leak  Note: Completed, air leak, CT patent  Intervention: For CT drainage >300 mL in first hour post op and/or 150 mL in subsequent hours: Stat platelets, PT, INR, TEG, iSTAT, and H&H per order  Note: N/A  Intervention: Titrate and wean off vasoactive drips per patient's condition and per MD order while maintaining SBP  mmHg per MD order  Note: Completed, see MAR  Intervention: VAP protocol in place  Note: Completed  Intervention: Wean from Vent per protocol (see protocol), extubation goal within 6 hours post op  Note: Completed, extubated at 1440 Total intubation time 2hrs, 51mins  Intervention: IS q 1 hour while awake post extubation  Note: Not yet completed.   Intervention: Bedrest until extubated and groin lines out  Note: Completed, EOB at 1700, ambulate to chair at 1705. Tolerated well.   Intervention: Dangle within 4 hours post extubation  Note: Completed at 1700  Intervention: Up in chair 4 hours, day of extubation  Note: Completed at 1705   Intervention: Maintain all original surgical dressings per provider orders and specifications  Note: Completed  Intervention: Clear liquids post extubation, order carbohydrate free (post cardiac surgery) diet, advance as tolerated  Note: Completed  Intervention: Discontinue [place] shaun and arterial line 12-18 hours post op if hemodynamically stable and off vasoactive drips  Note: N/A  Intervention: A-Fib and DVT prophylaxis per MD order or contraindications documented (refer to DVT/VTE problem on Care Plan)  Note: Completed, replaced electrolytes, on K scale.   Intervention: Amiodarone protocol per MD order  Note: N/A       Patient is not progressing towards the following goals:

## 2024-02-27 NOTE — PROGRESS NOTES
Notified CTS APRN of critical calcium of 6.8 and low Potassium of 3.3. Also patient was sinus rhythm-sinus bernardino most of the night but now that awake HR is 60-70.  Per APRN order ionized calcium, administer KDUR 40 mEq and OK to administer Metoprolol. Hold Atorvastatin.

## 2024-02-27 NOTE — ANESTHESIA PROCEDURE NOTES
JOSE    Date/Time: 2/27/2024 7:55 AM    Performed by: Eric Lozano M.D.  Authorized by: Eric Lozano M.D.    Start Time:2/27/2024 7:55 AM  Preanesthetic Checklist: patient identified, IV checked, site marked, risks and benefits discussed, surgical consent, monitors and equipment checked, pre-op evaluation and timeout performed    Indication for JOSE: diagnostic   Patient Location: OR  Intubated: Yes  Bite Block: Yes  Heart Visualized: Yes  Insertion: atraumatic    **See FULL JOSE report in patient's chart via CV Synapse**

## 2024-02-27 NOTE — PROGRESS NOTES
4 Eyes Skin Assessment Completed by LARA Mg and LARA Berry.    Head WDL  Ears WDL  Nose WDL  Mouth WDL  Neck WDL  Breast/Chest WDL  Shoulder Blades WDL  Spine WDL  (R) Arm/Elbow/Hand WDL  (L) Arm/Elbow/Hand WDL  Abdomen WDL  Groin WDL  Scrotum/Coccyx/Buttocks WDL  (R) Leg WDL  (L) Leg WDL  (R) Heel/Foot/Toe WDL  (L) Heel/Foot/Toe WDL          Devices In Places Tele Box, Blood Pressure Cuff, and Pulse Ox      Interventions In Place Pillows and Low Air Loss Mattress    Possible Skin Injury No    Pictures Uploaded Into Epic N/A  Wound Consult Placed N/A  RN Wound Prevention Protocol Ordered No

## 2024-02-27 NOTE — ANESTHESIA PREPROCEDURE EVALUATION
Case: [id number] Date/Time: 02/27/24 0715    Procedures:       REPLACEMENT, AORTIC VALVE      ECHOCARDIOGRAM, TRANSESOPHAGEAL, INTRAOPERATIVE    Location: [facility] / SURGERY [facility]    Surgeons: Kevin David D.O.            34 y/o male with past medical history of seizure disorder, hypertension, diabetes, heart murmur and bicuspid aortic valve presents with chest pain intermittently for the last two weeks. No recent seizure. Clean cath. To OR for AVR.  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 No recent respiratory issues.    Past Medical History:   Diagnosis Date    Benign heart murmur     Dr. Raven Reynaga    Breath shortness 12/2020    occasional since intubation 9/2020 no O2    History of COVID-19 09/17/2021    Pneumonia 09/2020    intubated    Seizure disorder (HCC)     Dr. Evans unknown reason last 9/18/20    Snoring    No recent breakthrough seizure    Past Surgical History:   Procedure Laterality Date    IN LARYNGOSCOPY,DIRCT,OP,BIOPSY N/A 12/14/2021    Procedure: LARYNGOSCOPY,DIRECT,WITH BIOPSY IF INDICATED;  Surgeon: Johnny Martines M.D.;  Location: SURGERY SAME DAY [facility];  Service: Ent    IN BRONCHOSCOPY,TRACH/BRONCH DILATN N/A 12/14/2021    Procedure: TRACHEAL DILATION,TRACHEOPLASTY,USING BALLOON;  Surgeon: Johnny Martines M.D.;  Location: SURGERY SAME DAY [facility];  Service: Ent    LARYNGOSCOPY  2/23/2021    Procedure: LARYNGOSCOPY - DIRECT W/TRACHEAL DILATION;  Surgeon: Johnny Martines M.D.;  Location: SURGERY SAME DAY [facility];  Service: Ent    IN BRONCHOSCOPY,DIAGNOSTIC N/A 12/31/2020    Procedure: BRONCHOSCOPY - WITH DILATION;  Surgeon: Johnny Martines M.D.;  Location: SURGERY SAME DAY [facility];  Service: Ent    LARYNGOSCOPY N/A 12/31/2020    Procedure: LARYNGOSCOPY - DIRECT;  Surgeon: Johnny Martines M.D.;  Location: SURGERY SAME DAY [facility];  Service: Ent    LARYNGOSCOPY N/A 11/20/2020    Procedure: LARYNGOSCOPY - DIRECT W/BALLOON DILATION;   "Surgeon: Johnny Martines M.D.;  Location: SURGERY [facility] DAY [facility];  Service: Ent    OTHER      tubes in ears     TONSILLECTOMY     No previous issue with anesthesia    Current Outpatient Medications   Medication Instructions    amLODIPine (NORVASC) 10 mg, Oral, DAILY    atenolol (TENORMIN) 50 mg, Oral, DAILY    divalproex ER (DEPAKOTE ER) 500 mg, Oral, TWO TIMES DAILY       /76   Pulse 65   Temp 37 °C (98.6 °F) (Temporal)   Resp 19   Ht 1.803 m (5' 11\")   Wt 101 kg (222 lb 0.1 oz)   SpO2 92%     No Known Allergies    Lab Results   Component Value Date/Time    SODIUM 144 02/27/2024 04:00 AM    POTASSIUM 3.3 (L) 02/27/2024 04:00 AM    CHLORIDE 114 (H) 02/27/2024 04:00 AM    CO2 23 02/27/2024 04:00 AM    GLUCOSE 87 02/27/2024 04:00 AM    BUN 11 02/27/2024 04:00 AM    CREATININE 0.47 (L) 02/27/2024 04:00 AM    CREATININE 0.9 10/12/2006 08:58 AM        Lab Results   Component Value Date/Time    WBC 9.2 02/25/2024 12:09 AM    RBC 6.29 (H) 02/25/2024 12:09 AM    HEMOGLOBIN 14.6 02/25/2024 12:09 AM    HEMATOCRIT 44.9 02/25/2024 12:09 AM    MCV 71.4 (L) 02/25/2024 12:09 AM    MCH 23.2 (L) 02/25/2024 12:09 AM    MCHC 32.5 02/25/2024 12:09 AM    MPV 10.9 02/25/2024 12:09 AM    NEUTSPOLYS 55.00 02/23/2024 08:18 PM    LYMPHOCYTES 36.40 02/23/2024 08:18 PM    MONOCYTES 7.20 02/23/2024 08:18 PM    EOSINOPHILS 0.40 02/23/2024 08:18 PM    BASOPHILS 0.70 02/23/2024 08:18 PM    HYPOCHROMIA 1+ 05/10/2011 11:43 AM    ANISOCYTOSIS 1+ 10/12/2006 08:58 AM      Cath 2/26/24:  FINDINGS:  I.  HEMODYNAMICS  Ao: 110/75 mmHg  II. CORONARY ANGIOGRAPHY:  Left main coronary artery: No CAD  Left anterior descending artery: No CAD  Left circumflex coronary artery: No CAD  Right coronary artery: No CAD  III.  AORTOGRAM: Mildly to moderately dilated thoracic aorta    TTE 2/24/24:  CONCLUSIONS  Prior study on 11/8/11, compared to the report of the prior study, there has been progression of his aortic stenosis.   Normal left ventricular " systolic function.  The left ventricular ejection fraction is visually estimated to be 60%.  Calcified, likely bicuspid aortic valve.  Severe aortic valve stenosis.  Vmax is 4.25 m/s. Transvalvular gradients are - Peak: 72 mmHg, Mean: 39 mmHg.  Mild aortic insufficiency.  Mild tricuspid regurgitation.  Estimated right ventricular systolic pressure is 30 mmHg.    Relevant Problems   NEURO   (positive) Seizure disorder (HCC)      CARDIAC   (positive) Aortic insufficiency   (positive) Aortic stenosis   (positive) Dilated aortic root (HCC)   (positive) HTN (hypertension)   (positive) Heart murmur       Physical Exam    Airway   Mallampati: II  TM distance: >3 FB  Neck ROM: full       Cardiovascular - normal exam  Rhythm: regular  Rate: normal  (-) murmur     Dental - normal exam           Pulmonary - normal exam  Breath sounds clear to auscultation     Abdominal    Neurological - normal exam                   Anesthesia Plan    ASA 4 (severe aortic stenosis)       Plan - general       Airway plan will be ETT  JOSE Planned  (Arterial and central lines)      Induction: intravenous    Postoperative Plan: Postoperative administration of opioids is intended.    Pertinent diagnostic labs and testing reviewed    Informed Consent:    Anesthetic plan and risks discussed with patient.    Use of blood products discussed with: patient whom consented to blood products.       Patient interviewed and procedure with anesthetic risks were discussed in detail.  Risks include but are not limited to prolonged intubation with ICU admission, invasive lines, PONV, pain, awareness, allergic reaction, sore throat, injury to teeth/lip/gums, esophageal or vocal cord injury, positioning injury, and/or cardiopulmonary problems up to and including death.  Patient is acutely aware that he will again be intubated for an extended period and could have additional tracheal issues afterward.  All questions were answered to satisfaction and patient consents  to proceed as plan above.

## 2024-02-27 NOTE — ANESTHESIA PROCEDURE NOTES
Central Venous Line    Performed by: Eric Lozano M.D.  Authorized by: Eric Lozano M.D.    Start Time:  2/27/2024 7:44 AM  Patient Location:  OR  Indication: central venous access and hemodynamic monitoring        provider hand hygiene performed prior to central venous catheter insertion, all 5 sterile barriers used (gloves, gown, cap, mask, large sterile drape) during central venous catheter insertion and skin prep agent completely dried prior to procedure    Patient Position:  Trendelenburg  Laterality:  Right  Site:  Subclavian  Prep:  Chlorhexidine  Catheter Size:  7 Fr  Catheter Length (cm):  20  Number of Lumens:  Triple lumen  target vein identified, needle advanced into vein and blood aspirated and guidewire advanced into vein    Seldinger Technique?: Yes    Ultrasound-Guided: surface landmarks    Intravenous Verification: venous blood return and chest x-ray pending    all ports aspirated, all ports flushed easily, guidewire was removed intact, biopatch was applied, line was sutured in place and dressing was applied    Events: patient tolerated procedure well with no complications    PA Catheter Placed?: No     Placed with one needle pass. No arterial, no pneumo.

## 2024-02-27 NOTE — CARE PLAN
The patient is Stable - Low risk of patient condition declining or worsening    Shift Goals  Clinical Goals: Cardiac cath  Patient Goals: Rest  Family Goals: SANTIAGO    Problem: Pain - Standard  Goal: Alleviation of pain or a reduction in pain to the patient’s comfort goal  Outcome: Progressing     Problem: Knowledge Deficit - Standard  Goal: Patient and family/care givers will demonstrate understanding of plan of care, disease process/condition, diagnostic tests and medications  Outcome: Progressing

## 2024-02-27 NOTE — PROGRESS NOTES
Attempted to remove 3ml from TR band. Radial site started bleeding. Air placed back into TR band, currently at 14ml.

## 2024-02-27 NOTE — PROGRESS NOTES
Received patient from telemetry in stable condition. Family at bedside. Monitoring equipment attached. TR band in place on R wrist with 14cc of air present. Unable to remove air due to oozing. Educated on limiting wrist movement.

## 2024-02-27 NOTE — CONSULTS
Critical Care History & Physical    Date of consult: 02/27/24    Referring Physician  KACI Fisher*    Reason for Consultation  Chief Complaint   Patient presents with    Chest Pain     X 2 weeks, pt reports 10/10 central chest pressure. +SOB intermittently, -N/V, -dizziness  Denies cardiac hx.        History of Presenting Illness  33 y.o. male with a history of seizure disorder and bicuspid aortic valve with severe aortic stenosis was admitted to the hospital on 2/23 with chest pressure.  He underwent cardiac catheterization on 2/26 that showed normal coronaries.  CTS was consulted and on 2/27 he underwent AVR with mechanical valve as well as aortic root enlargement.  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      Code Status  Full Code    Review of Systems  Review of Systems   Unable to perform ROS: Intubated     Past Medical History   has a past medical history of Benign heart murmur, Breath shortness (12/2020), History of COVID-19 (09/17/2021), Pneumonia (09/2020), Seizure disorder (HCC), Snoring, and Subglottic stenosis.    He has no past medical history of Clotting disorder (HCC).    Surgical History   has a past surgical history that includes laryngoscopy (N/A, 11/20/2020); tonsillectomy; pr bronchoscopy,diagnostic (N/A, 12/31/2020); laryngoscopy (N/A, 12/31/2020); other; laryngoscopy (2/23/2021); pr laryngoscopy,dirct,op,biopsy (N/A, 12/14/2021); and pr bronchoscopy,trach/bronch dilatn (N/A, 12/14/2021).    Family History  Reviewed and not pertinent    Social History   reports that he has never smoked. He has never used smokeless tobacco. He reports current alcohol use. He reports that he does not use drugs.    Medications  Home Medications       Reviewed by Bernardo Chang R.N. (Registered Nurse) on 02/27/24 at 0653  Med List Status: Complete     Medication  Last Dose Status   aminocaproic acid (Amicar) 10 g in  mL IV Bolus  Active   aminocaproic acid (Amicar) 5 g in  mL Infusion  Active   amLODIPine (NORVASC) 10 MG Tab 2/23/2024 Active   amLODIPine (Norvasc) tablet 10 mg  Active   aspirin EC tablet 81 mg  Active   atenolol (TENORMIN) 50 MG Tab 2/23/2024 Active   atenolol (Tenormin) tablet 50 mg  Active   atorvastatin (Lipitor) tablet 40 mg  Active   dexmedetomidine (PRECEDEX) 400 mcg/100mL NS premix infusion  Active   divalproex ER (DEPAKOTE ER) 500 MG TABLET SR 24 HR 2/23/2024 Active   divalproex ER (Depakote ER) tablet 500 mg  Active   enoxaparin (Lovenox) inj 40 mg  Active   EPINEPHrine (Adrenalin) infusion 4 mg/250 mL (premix)  Active   insulin regular (Humulin R) 100 Units in  mL Infusion  Active   lidocaine (Xylocaine) 1 % injection 0.5 mL  Active   methadone 10 mg/mL (Dolophine) injection 20 mg  Active   norepinephrine (Levophed) 8 mg in 250 mL NS infusion (premix)  Active   NS infusion  Active   polyethylene glycol/lytes (Miralax) Packet 1 Packet  Active   potassium chloride SA (Kdur) tablet 40 mEq  Active   senna-docusate (Pericolace Or Senokot S) 8.6-50 MG per tablet 2 Tablet  Active   vancomycin 1500 mg/250mL NS IVPB premix  Active                    Allergies  No Known Allergies      Vital Signs last 24 hours  Temp:  [36.1 °C (96.9 °F)-37 °C (98.6 °F)] 36.1 °C (96.9 °F)  Pulse:  [51-82] 65  Resp:  [16-20] 20  BP: ()/(54-85) 124/73  SpO2:  [88 %-100 %] 100 %      Physical Exam   Physical Exam  Vitals and nursing note reviewed. Exam conducted with a chaperone present.   Constitutional:       General: He is not in acute distress.     Interventions: He is intubated.   HENT:      Head: Normocephalic.      Mouth/Throat:      Mouth: Mucous membranes are moist.   Eyes:      Extraocular Movements: Extraocular movements intact.   Cardiovascular:      Rate and Rhythm: Normal rate.      Pulses: Normal pulses.      Comments:   Mediastinal tubes  in place, no active bleeding  Epicardial wires in place  Pulmonary:      Effort: Pulmonary effort is normal. No respiratory distress. He is intubated.   Abdominal:      General: There is no distension.      Palpations: Abdomen is soft.      Tenderness: There is no abdominal tenderness.   Musculoskeletal:         General: Normal range of motion.      Cervical back: Neck supple. No rigidity.   Skin:     General: Skin is warm and dry.      Capillary Refill: Capillary refill takes less than 2 seconds.   Neurological:      Comments: RASS -5           Fluids    Intake/Output Summary (Last 24 hours) at 2024 1202  Last data filed at 2024 1144  Gross per 24 hour   Intake 5661.38 ml   Output 4300 ml   Net 1361.38 ml         Laboratory  Recent Results (from the past 48 hour(s))   EKG    Collection Time: 24  8:44 PM   Result Value Ref Range    Report       [facility]    Test Date:  2024  Pt Name:    TARA ORTIZ                   Department: 171  MRN:        [id number]                      Room:       [facility]  Gender:     Male                         Technician: ASHA  :        1990                   Requested By:EDI POWER  Order #:    [id number]                    Reading MD: Cole Roberto MD    Measurements  Intervals                                Axis  Rate:       62                           P:          39  SD:         91                           QRS:        -15  QRSD:       93                           T:          -26  QT:         374  QTc:        380    Interpretive Statements  Sinus rhythm  Short SD interval  Borderline R wave progression  LVH by voltage criteria, repolarization abnormality  Electronically Signed On 2024 08:11:45 PST by Cole Roberto MD     EKG    Collection Time: 24  6:56 PM   Result Value Ref Range    Report       [facility]    Test Date:  2024  Pt Name:    TARA ORTIZ                   Department: 161  MRN:        [id number]                       Room:       [facility]  Gender:     Male                         Technician: HERMILO  :        1990                   Requested By:RAFFAELE RAJAN RERE  Order #:    [id number]                    Reading MD: Cole Roberto MD    Measurements  Intervals                                Axis  Rate:       72                           P:          70  MI:         80                           QRS:        128  QRSD:       89                           T:          -32  QT:         357  QTc:        391    Interpretive Statements  Sinus or ectopic atrial rhythm, consider arm lead switch  Short MI interval  Low voltage, precordial leads  Borderline R wave progression  Repol abnrm, inferior leads  Electronically Signed On 2024 19:06:00 PST by Cole Roberto MD     Urinalysis    Collection Time: 24  9:34 PM    Specimen: Urine, Clean Catch   Result Value Ref Range    Color Yellow     Character Clear     Specific Gravity >=1.045 (A) <1.035    Ph 6.5 5.0 - 8.0    Glucose Negative Negative mg/dL    Ketones Negative Negative mg/dL    Protein Negative Negative mg/dL    Bilirubin Negative Negative    Urobilinogen, Urine 2.0 Negative    Nitrite Negative Negative    Leukocyte Esterase Negative Negative    Occult Blood Negative Negative    Micro Urine Req see below    COD (Adult)    Collection Time: 24  9:34 PM   Result Value Ref Range    ABO Grouping Only A     Rh Grouping Only POS     Antibody Screen-Cod NEG    Prothrombin Time    Collection Time: 24  4:00 AM   Result Value Ref Range    PT 13.4 12.0 - 14.6 sec    INR 1.01 0.87 - 1.13   APTT    Collection Time: 24  4:00 AM   Result Value Ref Range    APTT 24.8 24.7 - 36.0 sec   Comp Metabolic Panel    Collection Time: 24  4:00 AM   Result Value Ref Range    Sodium 144 135 - 145 mmol/L    Potassium 3.3 (L) 3.6 - 5.5 mmol/L    Chloride 114 (H) 96 - 112 mmol/L    Co2 23 20 - 33 mmol/L    Anion Gap 7.0 7.0 - 16.0    Glucose 87 65 - 99 mg/dL    Bun 11 8 - 22 mg/dL     Creatinine 0.47 (L) 0.50 - 1.40 mg/dL    Calcium 6.8 (LL) 8.5 - 10.5 mg/dL    Correct Calcium 7.5 (L) 8.5 - 10.5 mg/dL    AST(SGOT) 12 12 - 45 U/L    ALT(SGPT) 19 2 - 50 U/L    Alkaline Phosphatase 44 30 - 99 U/L    Total Bilirubin 0.3 0.1 - 1.5 mg/dL    Albumin 3.1 (L) 3.2 - 4.9 g/dL    Total Protein 5.0 (L) 6.0 - 8.2 g/dL    Globulin 1.9 1.9 - 3.5 g/dL    A-G Ratio 1.6 g/dL   ESTIMATED GFR    Collection Time: 02/27/24  4:00 AM   Result Value Ref Range    GFR (CKD-EPI) 140 >60 mL/min/1.73 m 2   CBC WITHOUT DIFFERENTIAL    Collection Time: 02/27/24  5:10 AM   Result Value Ref Range    WBC 8.3 4.8 - 10.8 K/uL    RBC 6.13 (H) 4.70 - 6.10 M/uL    Hemoglobin 14.1 14.0 - 18.0 g/dL    Hematocrit 44.3 42.0 - 52.0 %    MCV 72.3 (L) 81.4 - 97.8 fL    MCH 23.0 (L) 27.0 - 33.0 pg    MCHC 31.8 (L) 32.3 - 36.5 g/dL    RDW 35.8 (L) 35.9 - 50.0 fL    Platelet Count 241 164 - 446 K/uL    MPV 10.7 9.0 - 12.9 fL   POCT glucose device results    Collection Time: 02/27/24  5:18 AM   Result Value Ref Range    POC Glucose, Blood 94 65 - 99 mg/dL   IONIZED CALCIUM    Collection Time: 02/27/24  5:31 AM   Result Value Ref Range    Ionized Calcium 1.1 1.1 - 1.3 mmol/L   Histology Request    Collection Time: 02/27/24 10:12 AM   Result Value Ref Range    Pathology Request Sent to Histo    Histology Request    Collection Time: 02/27/24 10:12 AM   Result Value Ref Range    Pathology Request Sent to Histo          Imaging  DX-CHEST-2 VIEWS   Final Result         1. No active cardiopulmonary abnormalities are identified.      US-CAROTID DOPPLER BILAT   Final Result      EC-ECHOCARDIOGRAM COMPLETE W/ CONT   Final Result      DX-CHEST-PORTABLE (1 VIEW)   Final Result      Cardiomegaly.      CL-LEFT HEART CATHETERIZATION WITH POSSIBLE INTERVENTION    (Results Pending)   EC-JOSE W/O CONT    (Results Pending)   DX-CHEST-PORTABLE (1 VIEW)    (Results Pending)         Assessment/Plan  * S/P AVR (aortic valve replacement)  Assessment & Plan  Procedure:  "Mechanical AVR (23mm On-X) and aortic root enlargement  Date of surgery: 2/27  Surgeon: Dr David    INR goal 1.5 - 2 with On-X valve  Start Heparin / coumadin tomorrow am assuming no bleeding    Monitor hemodynamics and titrate pressor and inotropic support.   Follow chest tube output and correct coagulopathy.     Monitor for post cardiac surgery complication such as myocardial dysfunction, bleeding, tamponade, graft dysfunction, arrhythmia, respiratory failure, neurologic, renal and infectious complication.      Monitor and treat stress hyperglycemia.     Extubate as soon as clinically possible     On mechanically assisted ventilation (HCC)  Assessment & Plan  Intubated date: 2/27  Reason intubated: Surgery  GI prophylaxis: PPI  Monitor ventilator waveforms & blood gases, titrate flow/peep and volumes according.   Daily SAT/SBT  All ventilator bundles are in place     Subglottic stenosis  Assessment & Plan  Due to prior prolonged course of mechanical ventilation  8-0 ETT would not fit  7-0 ETT was easily passed    Seizure disorder (HCC)- (present on admission)  Assessment & Plan  Continue depakote    HTN (hypertension)  Assessment & Plan  Resume amlodipine when clinically able    Obesity (BMI 30-39.9)  Assessment & Plan  Dietary  PT/OT    Aortic stenosis- (present on admission)  Assessment & Plan  See \"s/p AVR\" above    Bicuspid aortic valve- (present on admission)  Assessment & Plan  See \"s/p AVR\" above         VTE:  Per cardiac surgery  Ulcer: PPI  Lines: Central Line  Ongoing indication addressed, Arterial Line  Ongoing indication addressed and Gerber Catheter  Ongoing indication addressed      I have performed a physical exam and reviewed and updated ROS and Plan today (2/27/2024). In review of yesterday's note (2/26/2024), there are no changes except as documented above.     Discussed patient condition and risk of morbidity and/or mortality with Family, RN, RT, Pharmacy, , Charge nurse / hot " rounds, Patient and [facility]      The patient remains critically ill.  Critical care time = 36 minutes in directly providing and coordinating critical care and extensive data review.  No time overlap and excludes procedures.

## 2024-02-27 NOTE — CARE PLAN
Problem: Pre Op  Goal: Optimal preparation for CABG/Heart Valve surgery  Outcome: Progressing  Note: Pre op interventions completed as per orders.   Intervention: Pre Op education to patient/significant other. Provide patient [facility] Patient Guideline for Cardiac Surgery (See Pt. Ed.)  Note: Pre-op education provided by [facility] RN previous shift.   Intervention: Consents obtained for Surgery, Anesthesia and Transfusion/Bloodless Program Participant  Note: Consents printed and reviewed with patient.   Intervention: Pre op checklist completed.  Sign and held orders released. Admit profile completed. Advanced directive verified.  Note: Pre-op checklist completed per orders.  Intervention: Pre op labs per MD order: CBC, CMP, INR, PTT, COD and UA if not done in past 72 hours.  HbA1C if diabetic.  Note: CBC, CMP, INR, PTT, COD and UA all completed within 72 hours of surgery. HbA1C not indicated as patient is not diabetic.   Intervention: Pre op diagnostics per MD order (EKG, echo, cath, CXR, Bilateral carotid doppler study and vein mapping)  Note: EKG, Echo, Cath, Carotid doppler study and CXR completed within 72 hours of surgery.   Intervention: NPO at midnight except cardiac medications and PowerAde drink 2 hours prior to surgery (NO ASA, Coumadin, Plavix, Lovenox, or ACE/ARB)  Note: Patient NPO at midnight, Electrolyte drink provided 2 hours prior to surgery.  Intervention: Shower with Chlorhexidine x 2 (12 hours apart), night before surgery and morning of surgery  Note: Chlorhexidine shower provided night prior to surgery and morning of surgery, 12 hours apart.   Intervention: Prep with clippers - chin to toes bedline  Note: Patient clipped chin to toes.  Intervention: Remove dentures, valuables, jewelry, piercings, contacts, dentures and hearing aids  Note: Any removable items have been removed from patient.  Intervention: Beta blocker, gabapentin and Tylenol given at least 2 hours prior to surgery  Note: Metoprolol and  Tylenol ordered and administered. No order for Gabapentin.   Intervention: Pre op antibiotics ordered and on MAR  Note: Pre-op medication ordered on MAR.  Intervention: Chart back and consents sent to surgery with patient  Note: Consent to OR with patient.      Problem: Pre Op  Goal: Optimal preparation for CABG/Heart Valve surgery  Intervention: Pre Op education to patient/significant other. Provide patient [facility] Patient Guideline for Cardiac Surgery (See Pt. Ed.)  Note: Pre-op education provided by [facility] RN previous shift.      Problem: Pre Op  Goal: Optimal preparation for CABG/Heart Valve surgery  Intervention: Consents obtained for Surgery, Anesthesia and Transfusion/Bloodless Program Participant  Note: Consents printed and reviewed with patient.      Problem: Pre Op  Goal: Optimal preparation for CABG/Heart Valve surgery  Intervention: Pre op checklist completed.  Sign and held orders released. Admit profile completed. Advanced directive verified.  Note: Pre-op checklist completed per orders.     Problem: Pre Op  Goal: Optimal preparation for CABG/Heart Valve surgery  Intervention: Pre op labs per MD order: CBC, CMP, INR, PTT, COD and UA if not done in past 72 hours.  HbA1C if diabetic.  Note: CBC, CMP, INR, PTT, COD and UA all completed within 72 hours of surgery. HbA1C not indicated as patient is not diabetic.      Problem: Pre Op  Goal: Optimal preparation for CABG/Heart Valve surgery  Intervention: Pre op diagnostics per MD order (EKG, echo, cath, CXR, Bilateral carotid doppler study and vein mapping)  Note: EKG, Echo, Cath, Carotid doppler study and CXR completed within 72 hours of surgery.      Problem: Pre Op  Goal: Optimal preparation for CABG/Heart Valve surgery  Intervention: NPO at midnight except cardiac medications and PowerAde drink 2 hours prior to surgery (NO ASA, Coumadin, Plavix, Lovenox, or ACE/ARB)  Note: Patient NPO at midnight, Electrolyte drink provided 2 hours prior to surgery.      Problem: Pre Op  Goal: Optimal preparation for CABG/Heart Valve surgery  Intervention: Shower with Chlorhexidine x 2 (12 hours apart), night before surgery and morning of surgery  Note: Chlorhexidine shower provided night prior to surgery and morning of surgery, 12 hours apart.      Problem: Pre Op  Goal: Optimal preparation for CABG/Heart Valve surgery  Intervention: Prep with clippers - chin to toes bedline  Note: Patient clipped chin to toes.     Problem: Pre Op  Goal: Optimal preparation for CABG/Heart Valve surgery  Intervention: Remove dentures, valuables, jewelry, piercings, contacts, dentures and hearing aids  Note: Any removable items have been removed from patient.     Problem: Pre Op  Goal: Optimal preparation for CABG/Heart Valve surgery  Intervention: Beta blocker, gabapentin and Tylenol given at least 2 hours prior to surgery  Note: Metoprolol and Tylenol ordered and administered. No order for Gabapentin.      Problem: Pre Op  Goal: Optimal preparation for CABG/Heart Valve surgery  Intervention: Pre op antibiotics ordered and on MAR  Note: Pre-op medication ordered on MAR.     Problem: Pre Op  Goal: Optimal preparation for CABG/Heart Valve surgery  Intervention: Chart back and consents sent to surgery with patient  Note: Consent to OR with patient.    The patient is Stable - Low risk of patient condition declining or worsening    Shift Goals  Clinical Goals: Cardiac surgery prep  Patient Goals: Rest  Family Goals: SANTIAGO    Progress made toward(s) clinical / shift goals:  Progress toward goals as stated above.    Patient is not progressing towards the following goals:

## 2024-02-27 NOTE — PROGRESS NOTES
1200: Received report from Dr. Lozano S/P AVR and Aortic root enlargement. Patient on .4 Levo,  .5 Precedex, 1 unit/hr insulin. Mediastinal chest tubes are patent and hooked to suction, no air leak or clots. STAT Labs sent, , not actively pacing (back up VVI 45, 10, 2), pacemaker tested for capture and sensitivity. EKG at bedside.     1220: Patient is awake, moves all extremities and follows commands.     1440: Patient met all extubation parameters per protocol. Extubated to 4L NC by Subhash BETANCUR at 1440. No stridor noted, patient tolerating well.     Ph 7.38, CO2 35.2, VC 1L, PEEP 8, Pinsp 5, NIF-25, FiO2 40, Hemodynamic Stability (no gtts, just insulin), RSBI 17, RR 18, O2 saturation 99%.      1700: Patient off all gtts, hemodynamically stable, ambulated patient to chair. Patient tolerated well, comfort level increased.

## 2024-02-27 NOTE — ANESTHESIA TIME REPORT
Anesthesia Start and Stop Event Times       Date Time Event    2/27/2024 0713 Ready for Procedure     0723 Anesthesia Start     1200 Anesthesia Stop          Responsible Staff  02/27/24      Name Role Begin End    Eric Lozano M.D. Anesth 0723 1200          Overtime Reason:  no overtime (within assigned shift)    Comments: Arterial line with U/S, central line, and JOSE exam

## 2024-02-28 ENCOUNTER — APPOINTMENT (OUTPATIENT)
Dept: RADIOLOGY | Facility: MEDICAL CENTER | Age: 34
DRG: 218 | End: 2024-02-28
Attending: NURSE PRACTITIONER
Payer: COMMERCIAL

## 2024-02-28 LAB
ANION GAP SERPL CALC-SCNC: 9 MMOL/L (ref 7–16)
BUN SERPL-MCNC: 13 MG/DL (ref 8–22)
CALCIUM SERPL-MCNC: 7.8 MG/DL (ref 8.5–10.5)
CHLORIDE SERPL-SCNC: 105 MMOL/L (ref 96–112)
CO2 SERPL-SCNC: 24 MMOL/L (ref 20–33)
CREAT SERPL-MCNC: 0.65 MG/DL (ref 0.5–1.4)
EKG IMPRESSION: NORMAL
ERYTHROCYTE [DISTWIDTH] IN BLOOD BY AUTOMATED COUNT: 37.4 FL (ref 35.9–50)
GFR SERPLBLD CREATININE-BSD FMLA CKD-EPI: 127 ML/MIN/1.73 M 2
GLUCOSE BLD STRIP.AUTO-MCNC: 107 MG/DL (ref 65–99)
GLUCOSE BLD STRIP.AUTO-MCNC: 110 MG/DL (ref 65–99)
GLUCOSE BLD STRIP.AUTO-MCNC: 113 MG/DL (ref 65–99)
GLUCOSE BLD STRIP.AUTO-MCNC: 114 MG/DL (ref 65–99)
GLUCOSE BLD STRIP.AUTO-MCNC: 120 MG/DL (ref 65–99)
GLUCOSE BLD STRIP.AUTO-MCNC: 123 MG/DL (ref 65–99)
GLUCOSE BLD STRIP.AUTO-MCNC: 124 MG/DL (ref 65–99)
GLUCOSE BLD STRIP.AUTO-MCNC: 129 MG/DL (ref 65–99)
GLUCOSE BLD STRIP.AUTO-MCNC: 132 MG/DL (ref 65–99)
GLUCOSE BLD STRIP.AUTO-MCNC: 138 MG/DL (ref 65–99)
GLUCOSE BLD STRIP.AUTO-MCNC: 141 MG/DL (ref 65–99)
GLUCOSE BLD STRIP.AUTO-MCNC: 142 MG/DL (ref 65–99)
GLUCOSE BLD STRIP.AUTO-MCNC: 143 MG/DL (ref 65–99)
GLUCOSE BLD STRIP.AUTO-MCNC: 148 MG/DL (ref 65–99)
GLUCOSE BLD STRIP.AUTO-MCNC: 148 MG/DL (ref 65–99)
GLUCOSE BLD STRIP.AUTO-MCNC: 149 MG/DL (ref 65–99)
GLUCOSE SERPL-MCNC: 133 MG/DL (ref 65–99)
HCT VFR BLD AUTO: 36.4 % (ref 42–52)
HGB BLD-MCNC: 11.9 G/DL (ref 14–18)
INR PPP: 1.26 (ref 0.87–1.13)
LV EJECT FRACT  99904: 60
MCH RBC QN AUTO: 23.9 PG (ref 27–33)
MCHC RBC AUTO-ENTMCNC: 32.7 G/DL (ref 32.3–36.5)
MCV RBC AUTO: 73.2 FL (ref 81.4–97.8)
PLATELET # BLD AUTO: 157 K/UL (ref 164–446)
PMV BLD AUTO: 11.2 FL (ref 9–12.9)
POTASSIUM SERPL-SCNC: 4.5 MMOL/L (ref 3.6–5.5)
PROTHROMBIN TIME: 16 SEC (ref 12–14.6)
RBC # BLD AUTO: 4.97 M/UL (ref 4.7–6.1)
SODIUM SERPL-SCNC: 138 MMOL/L (ref 135–145)
WBC # BLD AUTO: 10.9 K/UL (ref 4.8–10.8)

## 2024-02-28 PROCEDURE — A9270 NON-COVERED ITEM OR SERVICE: HCPCS | Performed by: THORACIC SURGERY (CARDIOTHORACIC VASCULAR SURGERY)

## 2024-02-28 PROCEDURE — A9270 NON-COVERED ITEM OR SERVICE: HCPCS | Performed by: NURSE PRACTITIONER

## 2024-02-28 PROCEDURE — 80048 BASIC METABOLIC PNL TOTAL CA: CPT

## 2024-02-28 PROCEDURE — 94669 MECHANICAL CHEST WALL OSCILL: CPT

## 2024-02-28 PROCEDURE — 97163 PT EVAL HIGH COMPLEX 45 MIN: CPT

## 2024-02-28 PROCEDURE — 93010 ELECTROCARDIOGRAM REPORT: CPT | Performed by: INTERNAL MEDICINE

## 2024-02-28 PROCEDURE — 99024 POSTOP FOLLOW-UP VISIT: CPT | Performed by: NURSE PRACTITIONER

## 2024-02-28 PROCEDURE — 85610 PROTHROMBIN TIME: CPT

## 2024-02-28 PROCEDURE — 85027 COMPLETE CBC AUTOMATED: CPT

## 2024-02-28 PROCEDURE — 82962 GLUCOSE BLOOD TEST: CPT | Mod: 91

## 2024-02-28 PROCEDURE — 700102 HCHG RX REV CODE 250 W/ 637 OVERRIDE(OP): Performed by: THORACIC SURGERY (CARDIOTHORACIC VASCULAR SURGERY)

## 2024-02-28 PROCEDURE — 770022 HCHG ROOM/CARE - ICU (200)

## 2024-02-28 PROCEDURE — 71045 X-RAY EXAM CHEST 1 VIEW: CPT

## 2024-02-28 PROCEDURE — 700102 HCHG RX REV CODE 250 W/ 637 OVERRIDE(OP): Performed by: NURSE PRACTITIONER

## 2024-02-28 PROCEDURE — 93005 ELECTROCARDIOGRAM TRACING: CPT | Performed by: NURSE PRACTITIONER

## 2024-02-28 PROCEDURE — 700111 HCHG RX REV CODE 636 W/ 250 OVERRIDE (IP): Performed by: NURSE PRACTITIONER

## 2024-02-28 PROCEDURE — 99233 SBSQ HOSP IP/OBS HIGH 50: CPT | Performed by: STUDENT IN AN ORGANIZED HEALTH CARE EDUCATION/TRAINING PROGRAM

## 2024-02-28 PROCEDURE — 97535 SELF CARE MNGMENT TRAINING: CPT

## 2024-02-28 RX ORDER — DEXTROSE MONOHYDRATE 25 G/50ML
25 INJECTION, SOLUTION INTRAVENOUS
Status: DISCONTINUED | OUTPATIENT
Start: 2024-02-28 | End: 2024-03-03 | Stop reason: HOSPADM

## 2024-02-28 RX ORDER — WARFARIN SODIUM 2.5 MG/1
2.5 TABLET ORAL DAILY
Status: DISCONTINUED | OUTPATIENT
Start: 2024-02-28 | End: 2024-03-01

## 2024-02-28 RX ORDER — FUROSEMIDE 10 MG/ML
20 INJECTION INTRAMUSCULAR; INTRAVENOUS 2 TIMES DAILY
Status: DISCONTINUED | OUTPATIENT
Start: 2024-02-28 | End: 2024-02-29

## 2024-02-28 RX ORDER — POTASSIUM CHLORIDE 20 MEQ/1
10 TABLET, EXTENDED RELEASE ORAL 2 TIMES DAILY
Status: DISCONTINUED | OUTPATIENT
Start: 2024-02-28 | End: 2024-02-29

## 2024-02-28 RX ADMIN — OXYCODONE HYDROCHLORIDE 10 MG: 10 TABLET ORAL at 22:03

## 2024-02-28 RX ADMIN — DOCUSATE SODIUM 50 MG AND SENNOSIDES 8.6 MG 1 TABLET: 8.6; 5 TABLET, FILM COATED ORAL at 20:47

## 2024-02-28 RX ADMIN — ACETAMINOPHEN 1000 MG: 325 TABLET, FILM COATED ORAL at 17:30

## 2024-02-28 RX ADMIN — POTASSIUM CHLORIDE 10 MEQ: 1500 TABLET, EXTENDED RELEASE ORAL at 17:32

## 2024-02-28 RX ADMIN — OXYCODONE 5 MG: 5 TABLET ORAL at 09:00

## 2024-02-28 RX ADMIN — ACETAMINOPHEN 1000 MG: 325 TABLET, FILM COATED ORAL at 11:49

## 2024-02-28 RX ADMIN — OXYCODONE HYDROCHLORIDE 10 MG: 10 TABLET ORAL at 03:39

## 2024-02-28 RX ADMIN — DOCUSATE SODIUM 100 MG: 100 CAPSULE, LIQUID FILLED ORAL at 06:01

## 2024-02-28 RX ADMIN — DIVALPROEX SODIUM 500 MG: 500 TABLET, EXTENDED RELEASE ORAL at 08:58

## 2024-02-28 RX ADMIN — OXYCODONE HYDROCHLORIDE 10 MG: 10 TABLET ORAL at 12:00

## 2024-02-28 RX ADMIN — TRAMADOL HYDROCHLORIDE 50 MG: 50 TABLET ORAL at 10:04

## 2024-02-28 RX ADMIN — FENTANYL CITRATE 50 MCG: 50 INJECTION, SOLUTION INTRAMUSCULAR; INTRAVENOUS at 22:02

## 2024-02-28 RX ADMIN — METOPROLOL TARTRATE 12.5 MG: 25 TABLET, FILM COATED ORAL at 17:31

## 2024-02-28 RX ADMIN — WARFARIN SODIUM 2.5 MG: 2.5 TABLET ORAL at 17:29

## 2024-02-28 RX ADMIN — ACETAMINOPHEN 1000 MG: 325 TABLET, FILM COATED ORAL at 00:06

## 2024-02-28 RX ADMIN — FUROSEMIDE 20 MG: 10 INJECTION INTRAMUSCULAR; INTRAVENOUS at 07:05

## 2024-02-28 RX ADMIN — ENOXAPARIN SODIUM 40 MG: 100 INJECTION SUBCUTANEOUS at 17:37

## 2024-02-28 RX ADMIN — MAGNESIUM SULFATE IN DEXTROSE 1 G: 10 INJECTION, SOLUTION INTRAVENOUS at 06:05

## 2024-02-28 RX ADMIN — Medication 1 APPLICATOR: at 09:02

## 2024-02-28 RX ADMIN — Medication 1 APPLICATOR: at 20:47

## 2024-02-28 RX ADMIN — DIVALPROEX SODIUM 500 MG: 500 TABLET, EXTENDED RELEASE ORAL at 20:47

## 2024-02-28 RX ADMIN — METOPROLOL TARTRATE 12.5 MG: 25 TABLET, FILM COATED ORAL at 06:01

## 2024-02-28 RX ADMIN — ACETAMINOPHEN 1000 MG: 325 TABLET, FILM COATED ORAL at 06:01

## 2024-02-28 RX ADMIN — OXYCODONE HYDROCHLORIDE 10 MG: 10 TABLET ORAL at 17:58

## 2024-02-28 RX ADMIN — DOCUSATE SODIUM 100 MG: 100 CAPSULE, LIQUID FILLED ORAL at 17:30

## 2024-02-28 RX ADMIN — OMEPRAZOLE 20 MG: 20 CAPSULE, DELAYED RELEASE ORAL at 06:01

## 2024-02-28 RX ADMIN — FUROSEMIDE 20 MG: 10 INJECTION INTRAMUSCULAR; INTRAVENOUS at 17:35

## 2024-02-28 RX ADMIN — POTASSIUM CHLORIDE 10 MEQ: 1500 TABLET, EXTENDED RELEASE ORAL at 07:05

## 2024-02-28 RX ADMIN — ASPIRIN 81 MG: 81 TABLET, COATED ORAL at 06:03

## 2024-02-28 RX ADMIN — OXYCODONE HYDROCHLORIDE 10 MG: 10 TABLET ORAL at 14:57

## 2024-02-28 ASSESSMENT — ENCOUNTER SYMPTOMS
SORE THROAT: 0
HEADACHES: 0
MUSCULOSKELETAL NEGATIVE: 1
SHORTNESS OF BREATH: 0
FEVER: 0
ABDOMINAL PAIN: 0
FOCAL WEAKNESS: 0
CHILLS: 0
VOMITING: 0
PALPITATIONS: 0
SPUTUM PRODUCTION: 0
EYES NEGATIVE: 1
NAUSEA: 0

## 2024-02-28 ASSESSMENT — COGNITIVE AND FUNCTIONAL STATUS - GENERAL
WALKING IN HOSPITAL ROOM: A LITTLE
CLIMB 3 TO 5 STEPS WITH RAILING: A LOT
MOVING FROM LYING ON BACK TO SITTING ON SIDE OF FLAT BED: UNABLE
SUGGESTED CMS G CODE MODIFIER MOBILITY: CL
STANDING UP FROM CHAIR USING ARMS: A LITTLE
TURNING FROM BACK TO SIDE WHILE IN FLAT BAD: UNABLE
MOVING TO AND FROM BED TO CHAIR: UNABLE
MOBILITY SCORE: 11

## 2024-02-28 ASSESSMENT — PAIN DESCRIPTION - PAIN TYPE
TYPE: SURGICAL PAIN

## 2024-02-28 ASSESSMENT — PAIN SCALES - GENERAL: PAIN_LEVEL: 4

## 2024-02-28 ASSESSMENT — FIBROSIS 4 INDEX: FIB4 SCORE: 0.55

## 2024-02-28 NOTE — HOSPITAL COURSE
2/27 - Admitted to ICU after AVR and aortic root enlargement.  Extubated.  OOB to chair.      2/28 - Progressing well, ambulating.  Off drips

## 2024-02-28 NOTE — PROGRESS NOTES
2 RN Skin Assessment Completed by Dasha RN and William RN.    Head: WDL  Ears: WDL  Nose: WDL  Mouth: WDL  Neck: WDL  Breasts/Chest: incision and chest tube sites  Shoulder Blades: WDL  Spine: WDL  (R) Arm/Elbow/hand: WDL  (L) Arm/Elbow/hand: WDL  Abdomen:WDL  Groin: WDL  Sacrum/Coccyx/Buttocks: WDL  (R) Leg: WDL  (L) Leg: WDL  (R) Heel/Foot/Toe: WDL  (L) Heel/Foot/Toe: WDL          Devices in place: Tele Box, BP Cuff, Pulse Ox, and Gerber Temperature    Interventions in place: Gray ear foams, NC with ear foams, Pillows, Low air loss mattress , and Pressure redistribution mattress    Possible skin injury found: No    Pictures uploaded into Epic: N/A  Wound Consult Placed: N/A

## 2024-02-28 NOTE — DISCHARGE PLANNING
Case Management Discharge Planning    Admission Date: 2/23/2024  GMLOS: 5.2  ALOS: 5    6-Clicks ADL Score: 24  6-Clicks Mobility Score: 24      Anticipated Discharge Dispo: Discharge Disposition: Discharged to home/self care (01)  Per chart review pt resides in [place], [place].     Family support:   Name                                Relation    Phone   Rea Jay                   [phone]    Current Insurance on file: [employer] (Managed Care)     DME Needed: No    Action(s) Taken: Updated Provider/Nurse on Discharge Plan    Cart review complete; Pt was discussed in IDT rounds today; Pt was admitted with chest pressure; Pt underwent AVR on 2/27 and is post-op day 1; Pt currently on 3L NC; Pt remains critically ill at this time; LSW will continue to follow to assist with any CM needs.     Escalations Completed: None    Medically Clear: No    Next Steps: f/u with pt and medical team to discuss dc needs and barriers.     Barriers to Discharge: Medical clearance    Is the patient up for discharge tomorrow: No

## 2024-02-28 NOTE — CARE PLAN
Problem: Hyperinflation  Goal: Prevent or improve atelectasis  Description: Target End Date:  3 to 4 days    1. Instruct incentive spirometry usage  2.  Perform hyperinflation therapy as indicated  Outcome: Progressing  Flowsheets (Taken 2/27/2024 1711)  Hyperinflation Protocol Goals/Outcome: Increase and/or stable inspiratory capacity for 24 hours  Hyperinflation Protocol Indications: Abdominal/Thoracic Surgeries (Open Heart)       Respiratory Update    Treatment modality: PEP  Frequency: QID    Pt tolerating current treatments well with no adverse reactions.

## 2024-02-28 NOTE — PROGRESS NOTES
Critical Care Progress Note    Date of admission  2/23/2024    Chief Complaint  33 y.o. male with a history of seizure disorder and bicuspid aortic valve with severe aortic stenosis was admitted to the hospital on 2/23 with chest pressure.  He underwent cardiac catheterization on 2/26 that showed normal coronaries.  CTS was consulted and on 2/27 he underwent AVR with mechanical valve as well as aortic root enlargement.  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     Hospital Course  2/27 - Admitted to ICU after AVR and aortic root enlargement.  Extubated.  OOB to chair.      2/28 - Progressing well, ambulating.  Off drips    Interval Problem Update  Reviewed last 24 hour events:  Tmax: Afebrile  Diet: Advance per CTS  Vasopressors: None    Infusions: None    Antibx: Perioperative Ancef and vancomycin    Intake / Output  Urine Output past 24 hours: 1.5 L    Lab Trends  BMP WNL    Review of Systems  Review of Systems   Constitutional:  Negative for chills, fever and malaise/fatigue.   HENT:  Negative for congestion and sore throat.    Eyes: Negative.    Respiratory:  Negative for sputum production and shortness of breath.    Cardiovascular:  Negative for chest pain and palpitations.   Gastrointestinal:  Negative for abdominal pain, nausea and vomiting.   Genitourinary: Negative.    Musculoskeletal: Negative.    Skin: Negative.    Neurological:  Negative for focal weakness and headaches.   All other systems reviewed and are negative.       Vital Signs for last 24 hours   Temp:  [35.6 °C (96.1 °F)-37.9 °C (100.2 °F)] 37.7 °C (99.9 °F)  Pulse:  [60-90] 86  Resp:  [14-35] 35  BP: ()/(50-73) 114/62  SpO2:  [93 %-100 %] 94 %    Hemodynamic parameters for last 24 hours  CVP:  [0 MM HG-16 MM HG] 4 MM HG    Respiratory Information for the last 24 hours  Vent Mode: Spont  PEEP/CPAP: 8  P  Support: 5  MAP: 9.7  Control VTE (exp VT): 485    Physical Exam   Physical Exam  Vitals and nursing note reviewed. Exam conducted with a chaperone present.   Constitutional:       General: He is awake. He is not in acute distress.  HENT:      Head: Normocephalic.      Mouth/Throat:      Mouth: Mucous membranes are moist.   Eyes:      Extraocular Movements: Extraocular movements intact.   Cardiovascular:      Rate and Rhythm: Normal rate and regular rhythm.      Pulses: Normal pulses.      Comments: Mediastinal tubes in place, trace drainage  Pulmonary:      Effort: Pulmonary effort is normal. No respiratory distress.   Abdominal:      General: There is no distension.      Palpations: Abdomen is soft.      Tenderness: There is no abdominal tenderness.   Musculoskeletal:         General: Normal range of motion.      Cervical back: Normal range of motion and neck supple.   Skin:     General: Skin is warm.      Capillary Refill: Capillary refill takes less than 2 seconds.   Neurological:      General: No focal deficit present.      Mental Status: He is alert.         Medications  Current Facility-Administered Medications   Medication Dose Route Frequency Provider Last Rate Last Admin    benzocaine-menthol (Cepacol) lozenge 1 Lozenge  1 Lozenge Mouth/Throat Q2HRS PRN Db Diggs, A.P.R.N.        furosemide (Lasix) injection 20 mg  20 mg Intravenous BID Db Diggs A.P.R.N.        potassium chloride SA (Kdur) tablet 10 mEq  10 mEq Oral BID Db Diggs, A.P.R.N.        norepinephrine (Levophed) 8 mg in 250 mL NS infusion (premix)  0-1 mcg/kg/min (Ideal) Intravenous Continuous RITA Fisher.O.   Stopped at 02/27/24 1220    EPINEPHrine (Adrenalin) infusion 4 mg/250 mL (premix)  0-0.5 mcg/kg/min (Ideal) Intravenous Continuous Kevin David D.O.   Held at 02/27/24 1215    insulin regular (HumuLIN R,NovoLIN R) injection  0-14 Units Intravenous Once Kevin David D.O.        insulin lispro  (HumaLOG,AdmeLOG) injection  0-14 Units Subcutaneous TID AC Kevin David D.O.        dextrose 50% (D50W) injection 12.5-25 g  12.5-25 g Intravenous PRN Kevin David D.O. MD Alert...Pharmacy to initiate transition from insulin infusion to subcutaneous insulin for cardiothoracic surgery 1 Each  1 Each Other Continuous Kevin David D.O.        Respiratory Therapy Consult   Nebulization Continuous RT Db Diggs, A.P.R.N.        NS infusion   Intravenous Continuous Db Diggs, A.P.R.N. 10 mL/hr at 02/27/24 1233 New Bag at 02/27/24 1233    electrolyte-A (Plasmalyte-A) infusion   Intravenous PRN Db Diggs, A.P.R.N. 999 mL/hr at 02/27/24 1400 New Bag at 02/27/24 1400    enoxaparin (Lovenox) inj 40 mg  40 mg Subcutaneous DAILY AT 1800 Db Diggs, A.P.R.N.        Nozin nasal  swab  1 Applicator Each Nostril BID Db Diggs, A.P.R.N.   1 Applicator at 02/27/24 2009    calcium CHLORIDE 10 % 1,000 mg in dextrose 5% 100 mL IVPB  1,000 mg Intravenous Once PRN Db Diggs, A.P.R.N.        magnesium sulfate in D5W IVPB premix 1 g  1 g Intravenous DAILY Db Diggs, A.P.R.N. 100 mL/hr at 02/28/24 0605 1 g at 02/28/24 0605    K+ Scale: Goal of 4.5  1 Each Intravenous Q6HRS Db Diggs, A.P.R.N.   1 Each at 02/27/24 1215    metoprolol tartrate (Lopressor) tablet 12.5 mg  12.5 mg Oral BID Db Diggs, A.P.R.N.   12.5 mg at 02/28/24 0601    Followed by    [START ON 2/29/2024] metoprolol tartrate (Lopressor) tablet 25 mg  25 mg Oral BID Db Diggs, A.P.R.N.        aspirin EC tablet 81 mg  81 mg Oral DAILY ALYSSA Jasmine.P.R.N.   81 mg at 02/28/24 0603    clevidipine (Cleviprex) IV emulsion  0-21 mg/hr Intravenous Continuous ALYSSA Jasmine.P.R.N.   Stopped at 02/27/24 1250    nitroglycerin 50 mg in D5W 250 ml infusion  0-100 mcg/min Intravenous Continuous ALYSSA Jasmine.P.R.N.   Held at 02/27/24 1215    Pharmacy Consult Request ...Pain Management Review 1 Each  1  Each Other PHARMACY TO DOSE Db Diggs, A.P.R.N.        acetaminophen (Tylenol) tablet 1,000 mg  1,000 mg Oral Q6HRS Db Diggs, A.P.R.N.   1,000 mg at 02/28/24 0601    Followed by    [START ON 3/8/2024] acetaminophen (Tylenol) tablet 1,000 mg  1,000 mg Oral Q6HRS PRN Db Diggs, A.P.R.N.        oxyCODONE immediate-release (Roxicodone) tablet 5 mg  5 mg Oral Q3HRS PRN Db Diggs, A.P.R.N.   5 mg at 02/27/24 2008    Or    oxyCODONE immediate release (Roxicodone) tablet 10 mg  10 mg Oral Q3HRS PRN Db Diggs, A.P.R.N.   10 mg at 02/28/24 0339    Or    fentaNYL (Sublimaze) injection 50 mcg  50 mcg Intravenous Q3HRS PRN Db Diggs, A.P.R.N.        traMADol (Ultram) 50 MG tablet 50 mg  50 mg Oral Q4HRS PRN Db Diggs, A.P.R.N.   50 mg at 02/27/24 1746    midazolam (Versed) injection 2 mg  2 mg Intravenous Q HOUR PRN Db Diggs, A.P.R.N.        dexmedetomidine (PRECEDEX) 400 mcg/100mL NS premix infusion  0-1.5 mcg/kg/hr (Ideal) Intravenous Continuous Db Diggs, A.P.R.N.   Stopped at 02/28/24 0315    sodium bicarbonate 8.4 % injection 50 mEq  50 mEq Intravenous Q HOUR PRN Db Diggs, A.P.R.N.        morphine 4 MG/ML injection 4 mg  4 mg Intravenous Q HOUR PRN Db Diggs, A.P.R.N.        ondansetron (Zofran) syringe/vial injection 8 mg  8 mg Intravenous Q6HRS PRN Db Diggs, A.P.R.N.        Or    prochlorperazine (Compazine) injection 10 mg  10 mg Intravenous Q6HRS PRN Db Diggs, A.P.R.N.        acetaminophen (Tylenol) tablet 650 mg  650 mg Oral Q4HRS PRN Db Diggs A.P.R.N.        Or    acetaminophen (Tylenol) suppository 650 mg  650 mg Rectal Q4HRS PRN KERON JasmineP.RAlyshaN.        docusate sodium (Colace) capsule 100 mg  100 mg Oral BID ALYSSA Jasmine.P.R.N.   100 mg at 02/28/24 0601    senna-docusate (Pericolace Or Senokot S) 8.6-50 MG per tablet 1 Tablet  1 Tablet Oral Nightly ALYSSA Jasmine.P.R.N.   1 Tablet at 02/27/24 2009    senna-docusate (Pericolace Or Senokot S)  8.6-50 MG per tablet 1 Tablet  1 Tablet Oral Q24HRS PRN Db Diggs A.P.R.N.        polyethylene glycol/lytes (Miralax) Packet 1 Packet  1 Packet Oral BID PRN ALYSSA Jasmine.P.R.N.        magnesium hydroxide (Milk Of Magnesia) suspension 30 mL  30 mL Oral QDAY PRN Db Diggs A.P.R.N.        bisacodyl (Dulcolax) suppository 10 mg  10 mg Rectal Q24HRS PRN Db Diggs A.P.R.PEACE.        sodium phosphate (Fleet) enema 133 mL  1 Each Rectal Once PRN Db Diggs A.P.R.N.        omeprazole (PriLOSEC) capsule 20 mg  20 mg Oral DAILY ALYSSA Jasmine.P.R.N.   20 mg at 02/28/24 0601    mag hydrox-al hydrox-simeth (Maalox Plus Es Or Mylanta Ds) suspension 30 mL  30 mL Oral Q4HRS PRN Db Diggs A.P.R.N.        divalproex ER (Depakote ER) tablet 500 mg  500 mg Oral BID Db Diggs A.P.R.N.   500 mg at 02/27/24 2009    insulin regular (Humulin R) 100 Units in  mL Infusion  0-29 Units/hr Intravenous Continuous Kevin David D.O.   Stopped. (Insulin or Heparin) at 02/27/24 1604    MD Alert...Warfarin per Pharmacy   Other PHARMACY TO DOSE ALYSSA Jasmine.P.R.VIOLETA           Fluids    Intake/Output Summary (Last 24 hours) at 2/28/2024 0641  Last data filed at 2/28/2024 0600  Gross per 24 hour   Intake 7429.54 ml   Output 5585 ml   Net 1844.54 ml       Laboratory  Recent Labs     02/27/24  1203 02/27/24  1301 02/27/24  1406   ISTATAPH 7.405 7.479 7.372*   ISTATAPCO2 35.2 27.5 36.0   ISTATAPO2 143* 99* 75   ISTATATCO2 23 21 22   [id number] 99 98 95   ISTATARTHCO3 22.0 20.4 20.9   ISTATARTBE -2 -2 -4   ISTATTEMP 94.8 F 35.7 C 36.5 C   ISTATFIO2 100 80 40   ISTATSPEC Arterial Arterial Arterial   ISTATAPHTC 7.436 7.499 7.380*   [id number] 131* 91* 73         Recent Labs     02/27/24  0400 02/27/24  1150 02/27/24  1700 02/27/24  2300 02/28/24  0520   SODIUM 144  --   --   --  138   POTASSIUM 3.3* 4.4 4.7 4.5 4.5   CHLORIDE 114*  --   --   --  105   CO2 23  --   --   --  24   BUN 11  --   --   --  13  "  CREATININE 0.47*  --   --   --  0.65   MAGNESIUM  --  2.9*  --   --   --    CALCIUM 6.8*  --   --   --  7.8*     Recent Labs     02/27/24  0400 02/28/24  0520   ALTSGPT 19  --    ASTSGOT 12  --    ALKPHOSPHAT 44  --    TBILIRUBIN 0.3  --    GLUCOSE 87 133*     Recent Labs     02/27/24  0400 02/27/24  0510 02/28/24  0520   WBC  --  8.3 10.9*   ASTSGOT 12  --   --    ALTSGPT 19  --   --    ALKPHOSPHAT 44  --   --    TBILIRUBIN 0.3  --   --      Recent Labs     02/27/24  0400 02/27/24  0510 02/27/24  1150 02/28/24  0520   RBC  --  6.13*  --  4.97   HEMOGLOBIN  --  14.1 13.1* 11.9*   HEMATOCRIT  --  44.3 39.2* 36.4*   PLATELETCT  --  241 166 157*   PROTHROMBTM 13.4  --  17.8* 16.0*   APTT 24.8  --  42.3*  --    INR 1.01  --  1.45* 1.26*       Imaging  X-Ray:  I have personally reviewed the images and compared with prior images. and My impression is: Lines and tubes in appropriate position, no acute cardiopulmonary process    Assessment/Plan  * S/P AVR (aortic valve replacement)  Assessment & Plan  Procedure: Mechanical AVR (23mm On-X) and aortic root enlargement  Date of surgery: 2/27  Surgeon: Dr David    INR goal 1.5 - 2 with On-X valve  Start Heparin / coumadin tomorrow am assuming no bleeding    Off pressors  Small amount of chest tube output  Off insulin infusion  Extubated    Subglottic stenosis  Assessment & Plan  Due to prior prolonged course of mechanical ventilation  8-0 ETT would not fit  7-0 ETT was easily passed    Seizure disorder (HCC)- (present on admission)  Assessment & Plan  Continue depakote    On mechanically assisted ventilation (HCC)  Assessment & Plan  Intubated date: 2/27  Extubated: 2/27    RT protocols  Incentive spirometer    HTN (hypertension)  Assessment & Plan  Resume amlodipine when clinically able    Obesity (BMI 30-39.9)  Assessment & Plan  Dietary  PT/OT    Aortic stenosis- (present on admission)  Assessment & Plan  See \"s/p AVR\" above    Bicuspid aortic valve- (present on " "admission)  Assessment & Plan  See \"s/p AVR\" above          VTE:  Per cardiac surgery  Ulcer: PPI  Lines: Central Line  Ongoing indication addressed, Arterial Line  Ongoing indication addressed and Gerber Catheter  Ongoing indication addressed      I have performed a physical exam and reviewed and updated ROS and Plan today (2/28/2024). In review of yesterday's note (2/27/2024), there are no changes except as documented above.     Discussed patient condition and risk of morbidity and/or mortality with Family, RN, RT, Pharmacy, , Charge nurse / hot rounds, Patient and [facility]      CCM will sign off  "

## 2024-02-28 NOTE — PROGRESS NOTES
Patient has met the following  - has been out of bed  - Chest tube drainage <30cc/hour  - chest drainage unit to water-seal: no air leaks      Verified order with MD/APRN. Explained the procedure to the patient. Patient in semi fowlers position. CT clamped, sutures removed, CT removed upon expiration. Dressing applied, no complaint of pain or excessive drainage. Primary RN to order PRN chest x-ray if patient develops dyspnea or hypoxia.

## 2024-02-28 NOTE — PROGRESS NOTES
4 Eyes Skin Assessment Completed by LARA Kolb and LARA Givens.    Head WDL  Ears WDL  Nose WDL  Mouth WDL  Neck WDL  Breast/Chest Midline incision from OHS  Shoulder Blades WDL  Spine WDL  (R) Arm/Elbow/Hand WDL  (L) Arm/Elbow/Hand WDL  Abdomen Mediastinal chest tubes x2  Groin WDL  Scrotum/Coccyx/Buttocks WDL  (R) Leg WDL  (L) Leg WDL  (R) Heel/Foot/Toe WDL  (L) Heel/Foot/Toe WDL          Devices In Places ECG, Tele Box, Blood Pressure Cuff, Pulse Ox, Gerber, SCD's, Nasal Cannula, and PRABHU's      Interventions In Place Gray Ear Foams and Low Air Loss Mattress    Possible Skin Injury No    Pictures Uploaded Into Epic N/A  Wound Consult Placed N/A  RN Wound Prevention Protocol Ordered No

## 2024-02-28 NOTE — PROGRESS NOTES
Monitor Summary:  SB-SR 50-80 with 1st degree AV block, no ectopy  Pacer connected to Patient, back up settings VVI Rate 45, mA 10, sensitivity 2.

## 2024-02-28 NOTE — ANESTHESIA POSTPROCEDURE EVALUATION
Patient: Umesh Chang    Procedure Summary       Date: 02/27/24 Room / Location: [facility] 02 / SURGERY [facility]    Anesthesia Start: 0723 Anesthesia Stop: 1200    Procedures:       REPLACEMENT, AORTIC VALVE (Chest)      ECHOCARDIOGRAM, TRANSESOPHAGEAL, INTRAOPERATIVE (Esophagus)      ROOT ENLARGEMENT AORTA, ASCENDING (Chest) Diagnosis: (Aortic Valve insufficiency and Bicuspid aortic valve)    Surgeons: Kevin David D.O. Responsible Provider: Eric Lozano M.D.    Anesthesia Type: general ASA Status: 4            Final Anesthesia Type: general  Last vitals  BP   Blood Pressure: 111/64, Arterial BP: 106/49    Temp   36.5 °C (97.7 °F)    Pulse   85   Resp   16    SpO2   95 %      Anesthesia Post Evaluation    Patient location during evaluation: ICU  Patient participation: complete - patient participated  Level of consciousness: awake  Pain score: 4    Airway patency: patent  Anesthetic complications: no  Cardiovascular status: hemodynamically stable  Respiratory status: acceptable  Hydration status: balanced  Comments: Extubated 02/27/24 at 1415    PONV: none          There were no known notable events for this encounter.     Nurse Pain Score: 7 (NPRS)

## 2024-02-28 NOTE — PROGRESS NOTES
Cardiovascular Surgery Progress Note    Name: Umesh Chang  MRN: [id number]  : 1990  Admit Date: 2024  7:21 PM  1 Day Post-Op     Procedure:  Procedure(s) and Anesthesia Type:     * REPLACEMENT, AORTIC VALVE - General     * ECHOCARDIOGRAM, TRANSESOPHAGEAL, INTRAOPERATIVE - General     * ROOT ENLARGEMENT AORTA, ASCENDING - General    Vitals:  Vitals:    24 0300 24 0400 24 0415 24 0500   BP: 95/61 97/58 107/55 99/64   Pulse: 89 86 81 80   Resp: 16 (!) 35     Temp:  37.7 °C (99.9 °F)     TempSrc:  Bladder     SpO2:  97% 95% 96%   Weight:   104 kg (229 lb 15 oz)    Height:          Temp (24hrs), Av °C (98.6 °F), Min:35.6 °C (96.1 °F), Max:37.9 °C (100.2 °F)      Respiratory:  Vent Mode: Spont, PEEP/CPAP: 8, PIP: 14, MAP: 9.7 Respiration: (!) 35, Pulse Oximetry: 96 %       Fluids:    Intake/Output Summary (Last 24 hours) at 2024 0558  Last data filed at 2024 0500  Gross per 24 hour   Intake 7610.77 ml   Output 5543 ml   Net 2067.77 ml     Admit weight: Weight: 104 kg (230 lb)  Current weight: Weight: 104 kg (229 lb 15 oz) (24 0415)    Labs:  Recent Labs     24  0510 24  1150 24  0520   WBC 8.3  --  10.9*   RBC 6.13*  --  4.97   HEMOGLOBIN 14.1 13.1* 11.9*   HEMATOCRIT 44.3 39.2* 36.4*   MCV 72.3*  --  73.2*   MCH 23.0*  --  23.9*   MCHC 31.8*  --  32.7   RDW 35.8*  --  37.4   PLATELETCT 241 166 157*   MPV 10.7  --  11.2     Recent Labs     24  0400 24  1150 24  1700 24  2300 24  0520   SODIUM 144  --   --   --  138   POTASSIUM 3.3*   < > 4.7 4.5 4.5   CHLORIDE 114*  --   --   --  105   CO2 23  --   --   --  24   GLUCOSE 87  --   --   --  133*   BUN 11  --   --   --  13   CREATININE 0.47*  --   --   --  0.65   CALCIUM 6.8*  --   --   --  7.8*    < > = values in this interval not displayed.     Recent Labs     24  0400 24  1150 24  0520   APTT 24.8 42.3*  --    INR 1.01 1.45* 1.26*        HgbA1c:  5.3    Diabetic Educator Consult:  no    Medications:  Scheduled Medications   Medication Dose Frequency    insulin regular  0-14 Units Once    insulin lispro  0-14 Units TID AC    enoxaparin (LOVENOX) injection  40 mg DAILY AT 1800    Nozin nasal  swab  1 Applicator BID    magnesium sulfate  1 g DAILY    K+ Scale: Goal of 4.5  1 Each Q6HRS    metoprolol tartrate  12.5 mg BID    Followed by    [START ON 2/29/2024] metoprolol tartrate  25 mg BID    aspirin  81 mg DAILY    Pharmacy Consult Request  1 Each PHARMACY TO DOSE    acetaminophen  1,000 mg Q6HRS    docusate sodium  100 mg BID    senna-docusate  1 Tablet Nightly    omeprazole  20 mg DAILY    divalproex ER  500 mg BID    MD Alert...Warfarin per Pharmacy   PHARMACY TO DOSE        Exam:   Physical Exam  Vitals and nursing note reviewed.   Constitutional:       General: He is not in acute distress.     Appearance: Normal appearance.   HENT:      Head: Normocephalic and atraumatic.      Right Ear: External ear normal.      Left Ear: External ear normal.      Nose: Nose normal.      Mouth/Throat:      Mouth: Mucous membranes are moist.   Eyes:      Extraocular Movements: Extraocular movements intact.      Conjunctiva/sclera: Conjunctivae normal.      Pupils: Pupils are equal, round, and reactive to light.   Cardiovascular:      Rate and Rhythm: Normal rate and regular rhythm.      Pulses: Normal pulses.      Heart sounds: Normal heart sounds.   Pulmonary:      Effort: Pulmonary effort is normal.   Abdominal:      General: Abdomen is flat.      Palpations: Abdomen is soft.   Musculoskeletal:         General: Normal range of motion.      Cervical back: Normal range of motion.      Right lower leg: Edema present.      Left lower leg: Edema present.   Skin:     General: Skin is warm and dry.      Capillary Refill: Capillary refill takes less than 2 seconds.      Comments: Sternum-Island Dressing   Neurological:      General: No focal deficit  present.      Mental Status: He is alert and oriented to person, place, and time. Mental status is at baseline.   Psychiatric:         Mood and Affect: Mood normal.         Behavior: Behavior normal.         Cardiac Medications:    ASA - Yes    Plavix - No; contraindicated because of On Coumadin / NOAC    Post-operative Beta Blockers - Yes    Ace/ARB- No; contraindicated because of Normal EF    Statin - No; contraindicated because of No CAD    Aldactone- No; contraindicated because of Normal EF    SGLT2i-  No contraindicated because of No; contraindicated because of Normal EF    Ejection Fraction:  60%    Telemetry:   2/28 SR    Assessment/Plan:  POD 1 Extubated.  Off gtts.  Neuro intact.  Wounds CDI.  Abdomen soft nontender.  Cr 0.65 Hgb 11.9 Moderate output from mediastinal tubes.  Complaints of sore throat.  Plan: Cepacol lozenge PRN.  Keep mediastinal tubes and vera.  Start gentle diuresis.  Wean oxygen.  Encourage IS/ambulation.      Disposition:  TBD

## 2024-02-28 NOTE — CARE PLAN
Problem: Pain - Standard  Goal: Alleviation of pain or a reduction in pain to the patient’s comfort goal  Outcome: Progressing     Problem: Knowledge Deficit - Standard  Goal: Patient and family/care givers will demonstrate understanding of plan of care, disease process/condition, diagnostic tests and medications  Outcome: Progressing     Problem: Day of surgery post CABG/Heart valve replacement  Goal: Stabilization in immediate post op period  Outcome: Progressing  Intervention: VS q 15 min x 4 hours, then q 1 hour. Include temperature immediately upon arrival. Check CO/CI q 2-4 hours and PRN  Note: Done per protocol.   Intervention: If radial artery used, elevate arm, no BP checks or needle sticks from affected arm, monitor ulnar pulse and capillary refill  Note: N/A  Intervention: First post op hour labs and EKG per order  Note: Done on dayshift  Intervention: Serum K q 6 hours x 24 hours.  ABG and CBC prn.  Note: K checked Q6. No replacement needed.   Intervention: Initiate post cardiac insulin infusion protocol orders for FSBS greater than 140 and check frequency per protocol  Note: Done. Checked per protocol.   Intervention: FSBS frequency as per Cardiac Surgery Insulin Drip Protocol  Note: Done.   Intervention: Chest tube to 20 cm suction, record CT drainage with VS, and check for air leak  Note: CT to -20cm suction. No airleak noted.   Intervention: For CT drainage >300 mL in first hour post op and/or 150 mL in subsequent hours: Stat platelets, PT, INR, TEG, iSTAT, and H&H per order  Note: N/A  Intervention: Titrate and wean off vasoactive drips per patient's condition and per MD order while maintaining SBP  mmHg per MD order  Note: Pt off of vasoactive gtts overnight.   Intervention: VAP protocol in place  Note: N/A  Intervention: Wean from Vent per protocol (see protocol), extubation goal within 6 hours post op  Note: N/A; done during dayshift.   Intervention: IS q 1 hour while awake post  extubation  Note: Best IS: 500mL  Poor effort, guidance needed.   Intervention: Bedrest until extubated and groin lines out  Note: N/A  Intervention: Dangle within 4 hours post extubation  Note: Pt up in chair upon initial assessment.   Intervention: Up in chair 4 hours, day of extubation  Note: Done.   Intervention: Maintain all original surgical dressings per provider orders and specifications  Note: Island dressing remains in place at this time.   Intervention: Clear liquids post extubation, order carbohydrate free (post cardiac surgery) diet, advance as tolerated  Note: Done. Tolerating well.   Intervention: Discontinue [place] shaun and arterial line 12-18 hours post op if hemodynamically stable and off vasoactive drips  Note: Done. ART line d/c'd.   Intervention: A-Fib and DVT prophylaxis per MD order or contraindications documented (refer to DVT/VTE problem on Care Plan)  Note: Done.   Intervention: Amiodarone protocol per MD order  Note: N/A

## 2024-02-29 LAB
ANION GAP SERPL CALC-SCNC: 12 MMOL/L (ref 7–16)
BUN SERPL-MCNC: 12 MG/DL (ref 8–22)
CALCIUM SERPL-MCNC: 8.2 MG/DL (ref 8.5–10.5)
CHLORIDE SERPL-SCNC: 98 MMOL/L (ref 96–112)
CO2 SERPL-SCNC: 26 MMOL/L (ref 20–33)
CREAT SERPL-MCNC: 0.68 MG/DL (ref 0.5–1.4)
ERYTHROCYTE [DISTWIDTH] IN BLOOD BY AUTOMATED COUNT: 37 FL (ref 35.9–50)
GFR SERPLBLD CREATININE-BSD FMLA CKD-EPI: 125 ML/MIN/1.73 M 2
GLUCOSE BLD STRIP.AUTO-MCNC: 137 MG/DL (ref 65–99)
GLUCOSE SERPL-MCNC: 118 MG/DL (ref 65–99)
HCT VFR BLD AUTO: 36.2 % (ref 42–52)
HGB BLD-MCNC: 11.7 G/DL (ref 14–18)
INR PPP: 1.24 (ref 0.87–1.13)
MCH RBC QN AUTO: 23.8 PG (ref 27–33)
MCHC RBC AUTO-ENTMCNC: 32.3 G/DL (ref 32.3–36.5)
MCV RBC AUTO: 73.6 FL (ref 81.4–97.8)
PLATELET # BLD AUTO: 165 K/UL (ref 164–446)
PMV BLD AUTO: 11.9 FL (ref 9–12.9)
POTASSIUM SERPL-SCNC: 4.3 MMOL/L (ref 3.6–5.5)
PROTHROMBIN TIME: 15.8 SEC (ref 12–14.6)
RBC # BLD AUTO: 4.92 M/UL (ref 4.7–6.1)
SODIUM SERPL-SCNC: 136 MMOL/L (ref 135–145)
WBC # BLD AUTO: 13.8 K/UL (ref 4.8–10.8)

## 2024-02-29 PROCEDURE — 700102 HCHG RX REV CODE 250 W/ 637 OVERRIDE(OP): Performed by: THORACIC SURGERY (CARDIOTHORACIC VASCULAR SURGERY)

## 2024-02-29 PROCEDURE — 700102 HCHG RX REV CODE 250 W/ 637 OVERRIDE(OP): Performed by: NURSE PRACTITIONER

## 2024-02-29 PROCEDURE — A9270 NON-COVERED ITEM OR SERVICE: HCPCS | Performed by: NURSE PRACTITIONER

## 2024-02-29 PROCEDURE — 85027 COMPLETE CBC AUTOMATED: CPT

## 2024-02-29 PROCEDURE — 770020 HCHG ROOM/CARE - TELE (206)

## 2024-02-29 PROCEDURE — 85610 PROTHROMBIN TIME: CPT

## 2024-02-29 PROCEDURE — A9270 NON-COVERED ITEM OR SERVICE: HCPCS | Performed by: THORACIC SURGERY (CARDIOTHORACIC VASCULAR SURGERY)

## 2024-02-29 PROCEDURE — 80048 BASIC METABOLIC PNL TOTAL CA: CPT

## 2024-02-29 PROCEDURE — 99024 POSTOP FOLLOW-UP VISIT: CPT | Performed by: NURSE PRACTITIONER

## 2024-02-29 PROCEDURE — 700111 HCHG RX REV CODE 636 W/ 250 OVERRIDE (IP): Mod: JZ | Performed by: NURSE PRACTITIONER

## 2024-02-29 PROCEDURE — 94669 MECHANICAL CHEST WALL OSCILL: CPT

## 2024-02-29 PROCEDURE — 82962 GLUCOSE BLOOD TEST: CPT

## 2024-02-29 RX ORDER — POTASSIUM CHLORIDE 20 MEQ/1
20 TABLET, EXTENDED RELEASE ORAL 2 TIMES DAILY
Status: DISCONTINUED | OUTPATIENT
Start: 2024-02-29 | End: 2024-03-02

## 2024-02-29 RX ORDER — GUAIFENESIN 600 MG/1
600 TABLET, EXTENDED RELEASE ORAL EVERY 12 HOURS
Status: DISCONTINUED | OUTPATIENT
Start: 2024-02-29 | End: 2024-03-03 | Stop reason: HOSPADM

## 2024-02-29 RX ORDER — FUROSEMIDE 10 MG/ML
40 INJECTION INTRAMUSCULAR; INTRAVENOUS 2 TIMES DAILY
Status: DISCONTINUED | OUTPATIENT
Start: 2024-02-29 | End: 2024-03-02

## 2024-02-29 RX ADMIN — FUROSEMIDE 20 MG: 10 INJECTION INTRAMUSCULAR; INTRAVENOUS at 05:33

## 2024-02-29 RX ADMIN — WARFARIN SODIUM 2.5 MG: 2.5 TABLET ORAL at 17:09

## 2024-02-29 RX ADMIN — ACETAMINOPHEN 1000 MG: 325 TABLET, FILM COATED ORAL at 17:08

## 2024-02-29 RX ADMIN — ACETAMINOPHEN 1000 MG: 325 TABLET, FILM COATED ORAL at 05:32

## 2024-02-29 RX ADMIN — OMEPRAZOLE 20 MG: 20 CAPSULE, DELAYED RELEASE ORAL at 05:32

## 2024-02-29 RX ADMIN — METOPROLOL TARTRATE 25 MG: 25 TABLET, FILM COATED ORAL at 05:33

## 2024-02-29 RX ADMIN — Medication 1 APPLICATOR: at 21:00

## 2024-02-29 RX ADMIN — DIVALPROEX SODIUM 500 MG: 500 TABLET, EXTENDED RELEASE ORAL at 08:23

## 2024-02-29 RX ADMIN — Medication 1 APPLICATOR: at 08:24

## 2024-02-29 RX ADMIN — ASPIRIN 81 MG: 81 TABLET, COATED ORAL at 05:32

## 2024-02-29 RX ADMIN — DIVALPROEX SODIUM 500 MG: 500 TABLET, EXTENDED RELEASE ORAL at 21:00

## 2024-02-29 RX ADMIN — MAGNESIUM SULFATE IN DEXTROSE 1 G: 10 INJECTION, SOLUTION INTRAVENOUS at 05:35

## 2024-02-29 RX ADMIN — METOPROLOL TARTRATE 25 MG: 25 TABLET, FILM COATED ORAL at 17:08

## 2024-02-29 RX ADMIN — OXYCODONE HYDROCHLORIDE 10 MG: 10 TABLET ORAL at 05:26

## 2024-02-29 RX ADMIN — GUAIFENESIN 600 MG: 600 TABLET, EXTENDED RELEASE ORAL at 11:45

## 2024-02-29 RX ADMIN — POTASSIUM CHLORIDE 20 MEQ: 1500 TABLET, EXTENDED RELEASE ORAL at 17:09

## 2024-02-29 RX ADMIN — POTASSIUM CHLORIDE 10 MEQ: 1500 TABLET, EXTENDED RELEASE ORAL at 05:32

## 2024-02-29 RX ADMIN — GUAIFENESIN 600 MG: 600 TABLET, EXTENDED RELEASE ORAL at 17:36

## 2024-02-29 RX ADMIN — OXYCODONE HYDROCHLORIDE 10 MG: 10 TABLET ORAL at 21:14

## 2024-02-29 RX ADMIN — ENOXAPARIN SODIUM 40 MG: 100 INJECTION SUBCUTANEOUS at 17:10

## 2024-02-29 RX ADMIN — FENTANYL CITRATE 50 MCG: 50 INJECTION, SOLUTION INTRAMUSCULAR; INTRAVENOUS at 22:16

## 2024-02-29 RX ADMIN — ACETAMINOPHEN 1000 MG: 325 TABLET, FILM COATED ORAL at 01:23

## 2024-02-29 RX ADMIN — ACETAMINOPHEN 1000 MG: 325 TABLET, FILM COATED ORAL at 11:45

## 2024-02-29 RX ADMIN — OXYCODONE HYDROCHLORIDE 10 MG: 10 TABLET ORAL at 01:24

## 2024-02-29 RX ADMIN — OXYCODONE HYDROCHLORIDE 10 MG: 10 TABLET ORAL at 14:29

## 2024-02-29 RX ADMIN — FUROSEMIDE 40 MG: 10 INJECTION INTRAMUSCULAR; INTRAVENOUS at 17:09

## 2024-02-29 RX ADMIN — OXYCODONE HYDROCHLORIDE 10 MG: 10 TABLET ORAL at 10:24

## 2024-02-29 RX ADMIN — OXYCODONE HYDROCHLORIDE 10 MG: 10 TABLET ORAL at 18:14

## 2024-02-29 ASSESSMENT — FIBROSIS 4 INDEX
FIB4 SCORE: 0.55
FIB4 SCORE: 0.55

## 2024-02-29 ASSESSMENT — PAIN DESCRIPTION - PAIN TYPE: TYPE: SURGICAL PAIN

## 2024-02-29 NOTE — PROGRESS NOTES
Cardiovascular Surgery Progress Note    Name: Umesh Chang  MRN: [id number]  : 1990  Admit Date: 2024  7:21 PM  2 Days Post-Op     Procedure:  Procedure(s) and Anesthesia Type:     * REPLACEMENT, AORTIC VALVE - General     * ECHOCARDIOGRAM, TRANSESOPHAGEAL, INTRAOPERATIVE - General     * ROOT ENLARGEMENT AORTA, ASCENDING - General    Vitals:  Vitals:    24 0700 24 0723 24 0800 24 0900   BP: 118/60 118/60 118/60 113/81   Pulse: 91 96 80 81   Resp:       Temp:   35.9 °C (96.6 °F)    TempSrc:   Temporal    SpO2: 94% 94% 95% 97%   Weight:       Height:          Temp (24hrs), Av.5 °C (97.7 °F), Min:35.9 °C (96.6 °F), Max:36.7 °C (98.1 °F)      Respiratory:    Respiration: 18, Pulse Oximetry: 97 %       Fluids:    Intake/Output Summary (Last 24 hours) at 2024 1015  Last data filed at 2024 0600  Gross per 24 hour   Intake 800 ml   Output 1530 ml   Net -730 ml     Admit weight: Weight: 104 kg (230 lb)  Current weight: Weight: 99.7 kg (219 lb 12.8 oz) (24 0556)    Labs:  Recent Labs     24  0510 24  1150 24  0520 24  0130   WBC 8.3  --  10.9* 13.8*   RBC 6.13*  --  4.97 4.92   HEMOGLOBIN 14.1 13.1* 11.9* 11.7*   HEMATOCRIT 44.3 39.2* 36.4* 36.2*   MCV 72.3*  --  73.2* 73.6*   MCH 23.0*  --  23.9* 23.8*   MCHC 31.8*  --  32.7 32.3   RDW 35.8*  --  37.4 37.0   PLATELETCT 241 166 157* 165   MPV 10.7  --  11.2 11.9     Recent Labs     24  0400 24  1150 24  2300 24  0520 24  0130   SODIUM 144  --   --  138 136   POTASSIUM 3.3*   < > 4.5 4.5 4.3   CHLORIDE 114*  --   --  105 98   CO2 23  --   --  24 26   GLUCOSE 87  --   --  133* 118*   BUN 11  --   --  13 12   CREATININE 0.47*  --   --  0.65 0.68   CALCIUM 6.8*  --   --  7.8* 8.2*    < > = values in this interval not displayed.     Recent Labs     24  0400 24  1150 24  0520 24  0130   APTT 24.8 42.3*  --   --    INR 1.01 1.45* 1.26* 1.24*        HgbA1c:  5.3    Diabetic Educator Consult:  no    Medications:  Scheduled Medications   Medication Dose Frequency    furosemide  20 mg BID    potassium chloride SA  10 mEq BID    insulin regular  2-9 Units 4X/DAY ACHS    warfarin  2.5 mg DAILY AT 1800    enoxaparin (LOVENOX) injection  40 mg DAILY AT 1800    Nozin nasal  swab  1 Applicator BID    metoprolol tartrate  25 mg BID    aspirin  81 mg DAILY    Pharmacy Consult Request  1 Each PHARMACY TO DOSE    acetaminophen  1,000 mg Q6HRS    docusate sodium  100 mg BID    senna-docusate  1 Tablet Nightly    omeprazole  20 mg DAILY    divalproex ER  500 mg BID    MD Alert...Warfarin per Pharmacy   PHARMACY TO DOSE        Exam:   Physical Exam  Vitals and nursing note reviewed.   Constitutional:       General: He is not in acute distress.     Appearance: Normal appearance.   HENT:      Head: Normocephalic and atraumatic.      Right Ear: External ear normal.      Left Ear: External ear normal.      Nose: Nose normal.      Mouth/Throat:      Mouth: Mucous membranes are moist.   Eyes:      Extraocular Movements: Extraocular movements intact.      Conjunctiva/sclera: Conjunctivae normal.      Pupils: Pupils are equal, round, and reactive to light.   Cardiovascular:      Rate and Rhythm: Normal rate and regular rhythm.      Pulses: Normal pulses.      Heart sounds: Normal heart sounds.   Pulmonary:      Effort: Pulmonary effort is normal.   Abdominal:      General: Abdomen is flat.      Palpations: Abdomen is soft.   Musculoskeletal:         General: Normal range of motion.      Cervical back: Normal range of motion.      Right lower leg: Edema present.      Left lower leg: Edema present.   Skin:     General: Skin is warm and dry.      Capillary Refill: Capillary refill takes less than 2 seconds.      Comments: Sternum-[place]land Dressing   Neurological:      General: No focal deficit present.      Mental Status: He is alert and oriented to person, place, and time.  Mental status is at baseline.   Psychiatric:         Mood and Affect: Mood normal.         Behavior: Behavior normal.         Cardiac Medications:    ASA - Yes    Plavix - No; contraindicated because of On Coumadin / NOAC    Post-operative Beta Blockers - Yes    Ace/ARB- No; contraindicated because of Normal EF    Statin - No; contraindicated because of No CAD    Aldactone- No; contraindicated because of Normal EF    SGLT2i-  No contraindicated because of No; contraindicated because of Normal EF    Ejection Fraction:  60%    Telemetry:   2/28 SR  2/29 SR    Assessment/Plan:  POD 1 Extubated.  Off gtts.  Neuro intact.  Wounds CDI.  Abdomen soft nontender.  Cr 0.65 Hgb 11.9 Moderate output from mediastinal tubes.  Complaints of sore throat.  Plan: Cepacol lozenge PRN.  Keep mediastinal tubes and vera.  Start gentle diuresis.  Wean oxygen.  Encourage IS/ambulation.      POD 2 HDS. SR. Neuro intact.  Wounds CDI.  Mediastinal tubes removed yesterday.  Cr 0.68 Hgb 11.7.  Pacer wires removed.  Plan: Increase diuresis.  DC vera.  Transfer to tele.  Add mucinex. Encourage IS/ambulation.      Disposition:  PT home  FWW ordered

## 2024-02-29 NOTE — THERAPY
"Physical Therapy   Initial Evaluation     Patient Name: Umesh Chang  Age:  33 y.o., Sex:  male  Medical Record #: [id number]  Today's Date: 2/28/2024     Precautions  Precautions: Fall Risk;Cardiac Precautions (See Comments);Sternal Precautions (See Comments)  Comments: 2/27: S/P Aortic valve replacement, Aorta root enlargement  Chest tube    Assessment  Patient is 33 y.o. male   2/27: S/P Aortic valve replacement, Aorta root enlargement  Admitted on 2/23/24 with Chest PainX 2 weeks, pt reports 10/10 central chest pressure. +SOB intermittently.  Hx of benign heart murmur, SOB, PNA, Sz disorder, snoring.    Per RN, pt fearful of moving and injurying his sternum. Pt with limited movement upon entering room. C/o buttocks hurts from chair. Assisted pt to standing at FWW and placed a waffle cushion on chair. Pt did not want to walk this session. BP and HR WNL with change of position, no c/o dizziness. Pt required CGA with sit to/from stand and demonstrated mobility within sternal precautions. Pt educated ok to weight shift in chair for comfort. He stated he felt more comfortable with moving after education.     Pt and family educated on the following information from the \"Recovering from Heart Surgery\" handout (provided family handout in Yakut):  *Hospital Exercise Program  *Normal Expectations after Open Heart Surgery  *Bed Mobility within Sternal Precautions  *Heart Healthy Program Cardiac Rehab Phase II  *Fall prevention with positional changes    Pt will benefit from review of the above education and initiate education on the following topics:  *Home Exercise Program  *RPE  *Talk Test  *Activity Schedule Following Discharge after Open Heart Surgery  *Cardiac Surgery Discharge Concerns Decision Tree  *Heart Surgery Log  *S/S of heart failure    Anticipate pt will benefit from family assistance and Cardiac Rehab Phase II upon DC from hospital.     Plan    Physical Therapy Initial Treatment Plan   Treatment Plan : Bed " "Mobility, Equipment, Gait Training, Neuro Re-Education / Balance, Stair Training, Therapeutic Activities, Therapeutic Exercise  Treatment Frequency: 4 Times per Week  Duration: Until Therapy Goals Met    DC Equipment Recommendations: Front-Wheel Walker  Discharge Recommendations: Other - (Cardiac Rehab Phase II)       Subjective    Pt resting in chair, agreed to PT.      Objective       02/28/24 1144   Initial Contact Note    Initial Contact Note Order Received and Verified, Physical Therapy Evaluation in Progress with Full Report to Follow.   Precautions   Precautions Fall Risk;Cardiac Precautions (See Comments);Sternal Precautions (See Comments)   Comments 2/27: S/P Aortic valve replacement, Aorta root enlargement   Vitals   Vitals Comments Seated BP: 116/62 HR 90. Standing BP: 115/66 HR 81.   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 and spouse able to assist pt upon DC   Prior Level of Functional Mobility   Ambulation Independent   Ambulation Distance community   Assistive Devices Used None   Cognition    Cognition / Consciousness WDL   Level of Consciousness Alert   Comments pleasant, cooperative, min anxious to move, \"I dont know what I can and can't do.\" improved after education   Active ROM Lower Body    Active ROM Lower Body  WDL   Strength Lower Body   Lower Body Strength  WDL   Sensation Lower Body   Lower Extremity Sensation   WDL   Coordination Lower Body    Coordination Lower Body  WDL   Balance " Assessment   Sitting Balance (Static) Fair +   Sitting Balance (Dynamic) Fair   Standing Balance (Static) Fair   Standing Balance (Dynamic) Fair   Weight Shift Sitting Fair   Weight Shift Standing Fair   Comments standing assessed with FWW, pt educated ok to weight shift on/off buttocks for comfort, weight shifting improved with education   Bed Mobility    Comments up in recliner pre/post mobility   Gait Analysis   Gait Level Of Assist   (pt requested to sit down and did not want to walk)   Functional Mobility   Sit to Stand Contact Guard Assist  (rocking technique, holding cardiac pillow)   Bed, Chair, Wheelchair Transfer Contact Guard Assist   6 Clicks Assessment   Turning from your back to your side while in a flat bed without using bedrails? 1   Moving from lying on your back to sitting on the side of a flat bed without using bedrails? 1   Moving to and from a bed to a chair (including a wheelchair)? 1   Standing up from a chair using your arms (e.g., wheelchair, or bedside chair)? 3   Walking in hospital room? 3   Climbing 3-5 steps with a railing? 2   6 clicks Mobility Score 11   Activity Tolerance   Sitting in Chair up in recliner pre/post eval   Standing 4 minutes at FWW   Comments min self limiting due to pain at sternum   Short Term Goals    Short Term Goal # 1 supine to/from sit flat bed supervised in 6 visits to progress with bed mobility   Short Term Goal # 2 sit to/from stand EOB with FWW supervised in 6 visits to progress with transfers   Short Term Goal # 3 amb 6 minutes with FWW supervised in 6 visits for functional distances   Short Term Goal # 4 Pt will be able to teach back and demonstrate mobility within sternal precautions, able to refer to decision tree, and ask questions about Cardiac Rehab Phase I within 6 visits.   Short Term Goal # 5 navigate 1 step with CGA in 6 visits to access apartment   Education Group   Education Provided Role of Physical Therapist;Cardiac Precautions;Sternal  Precautions;Use of Assistive Device   Cardiac Precautions Patient Response Patient;Family;Acceptance;Explanation;Verbal Demonstration;Action Demonstration;Handout   Sternal Precautions Patient Response Patient;Family;Acceptance;Explanation;Demonstration;Handout;Teach Back;Verbal Demonstration;Action Demonstration   Role of Physical Therapist Patient Response Patient;Family;Acceptance;Explanation;Verbal Demonstration   Additional Comments Fall prevention with positional changes   Physical Therapy Initial Treatment Plan    Treatment Plan  Bed Mobility;Equipment;Gait Training;Neuro Re-Education / Balance;Stair Training;Therapeutic Activities;Therapeutic Exercise   Treatment Frequency 4 Times per Week   Duration Until Therapy Goals Met   Problem List    Problems Pain;Impaired Bed Mobility;Impaired Transfers;Impaired Ambulation;Impaired Balance;Decreased Activity Tolerance;Limited Knowledge of Post-Op Precautions   Anticipated Discharge Equipment and Recommendations   DC Equipment Recommendations Front-Wheel Walker   Discharge Recommendations Other -  (Cardiac Rehab Phase II)   Interdisciplinary Plan of Care Collaboration   IDT Collaboration with  Nursing;Family / Caregiver  (Uncle and mother in room)   Patient Position at End of Therapy Seated;Call Light within Reach;Tray Table within Reach;Phone within Reach;Family / Friend in Room   Collaboration Comments RN updated   Session Information   Date / Session Number  2/28 1(1/4, 3/5)  ([facility])

## 2024-02-29 NOTE — CARE PLAN
The patient is Stable - Low risk of patient condition declining or worsening    Shift Goals  Clinical Goals: Stable VS, rest, pain control, use IS  Patient Goals: Pain control, sleep  Family Goals: 'Get better', rest    Progress made toward(s) clinical / shift goals:  Patient medicated for pain.      Problem: Post Op Day 1 CABG/Heart Valve Replacement  Goal: Optimal care of the post op CABG/heart valve replacement Post Op Day 1  Intervention: EKG and CXR completed  Note: Done  Intervention: All valve patients: PT/INR daily  Note: Done  Intervention: Antibiotics are discontinued within 24 hours of anesthesia end time unless indication documented for continuation beyond 24 hours  Note: Lovenox and coumadin.   Intervention: Daily weights in the morning  Note: Check  Intervention: Up in chair for all meals  Note: Patient was up in the chair for all meals this shift  Intervention: Ambulate in am if stable. First ambulation 25 feet. Repeat x 3 as tolerated  Note: Patient walked twice this shift about 20 feet each time.   Intervention: Discontinue vera catheter unless documented reason for continuation  Note: Vera to keep in place per APRN  Intervention: Assess surgical dressing and check provider orders for potential removal  Note: Incision washed with soap and water. CHG bath given.  Intervention: [facility] trained RN to remove chest tubes if ordered by provider  Note: This was done by LARA Thomas,  Intervention: IS q 1 hour while awake and record best IS volume  Note: 500 was the best IS pull. Use encouraged.  Intervention: Knee high PRABHU hose, on during the day, off at night  Note: Done. Teds in place.  Intervention: Saline lock IV  Note: SL. Check  Intervention: After 24th hour post-anesthesia end time, transition patient to Cardiac Surgery SQ Insulin Protocol  Note: Done  Intervention: If patient is CABG or on home beta-blocker, start/resume beta-blocker on POD 1 or POD 2 or document contraindication  Note: Metoprolol PO

## 2024-02-29 NOTE — DISCHARGE PLANNING
Discharge Appointments/outpatient referrals/ and HH set up:    Cardiac surgery follow up appointment made for 4-5 weeks out    Hospital discharge team/schedulers called for cardiology f/u appointment for MD or APRN within 3-4 weeks if possible.    Aortic surveillance program/vascular medicine referral not needed, orders not placed.    Anticoagulation referral/ coumadin clinic referral needed and orders placed .    INR draws are indicated for On-X mechanical valve replacement and warfarin.    Will await PT/OT notes and medical progression to determine further discharge needs.

## 2024-02-29 NOTE — CARE PLAN
Problem: Hyperinflation  Goal: Prevent or improve atelectasis  Description: Target End Date:  3 to 4 days    1. Instruct incentive spirometry usage  2.  Perform hyperinflation therapy as indicated  Outcome: Progressing      Respiratory Update    Treatment modality: PEP  Frequency:QID      Pt tolerating current treatments well with no adverse reactions.

## 2024-02-29 NOTE — CARE PLAN
The patient is Stable - Low risk of patient condition declining or worsening    Shift Goals  Clinical Goals: stable vitals, improve mobility, control pain  Patient Goals: pain control/ rest  Family Goals: recovery    Progress made toward(s) clinical / shift goals:    Problem: Pain - Standard  Goal: Alleviation of pain or a reduction in pain to the patient’s comfort goal  Outcome: Progressing  Note: ATC pain control     Problem: Hemodynamics  Goal: Patient's hemodynamics, fluid balance and neurologic status will be stable or improve  Outcome: Progressing     Problem: Post Op Day 1 CABG/Heart Valve Replacement  Goal: Optimal care of the post op CABG/heart valve replacement Post Op Day 1  Outcome: Progressing  Intervention: EKG and CXR completed  Note: Completed  Intervention: All valve patients: PT/INR daily  Note: This AM  Intervention: Antibiotics are discontinued within 24 hours of anesthesia end time unless indication documented for continuation beyond 24 hours  Note: completed  Intervention: Daily weights in the morning  Note: completed  Intervention: Up in chair for all meals  Note: completed  Intervention: Ambulate in am if stable. First ambulation 25 feet. Repeat x 3 as tolerated  Note: Walked x3.  Furthest 100 ft  Intervention: Discontinue vera catheter unless documented reason for continuation  Note: Vera still in place at this time  Intervention: Assess surgical dressing and check provider orders for potential removal  Note: Midline incision kash  Intervention: [facility] trained RN to remove chest tubes if ordered by provider  Note: Chest tubes removed  Intervention: IS q 1 hour while awake and record best IS volume  Note: Best   Intervention: Knee high PRABHU hose, on during the day, off at night  Note: done  Intervention: Saline lock IV  Note: No PIVs.  CL saline locked  Intervention: After 24th hour post-anesthesia end time, transition patient to Cardiac Surgery SQ Insulin Protocol  Note: Pt on SQ  insulin  Intervention: If patient is CABG or on home beta-blocker, start/resume beta-blocker on POD 1 or POD 2 or document contraindication  Note: Metoprolol ordered for AM

## 2024-02-29 NOTE — DIETARY
Nutrition Services: Cardiac Rehab Education Consult received.   Day 6 of admit.  Umesh Chang is a 33 y.o. male with admitting DX of NSTEMI. Pt s/p AVR with hx of HTN and obesity.     RD able to visit pt at bedside to provide verbal and written information on Heart healthy education.  Pt demonstrated  readiness and evidence of learning. RD able to answer all questions to patient's satisfaction.     No other education needs identified at this time. Consider referral to outpatient nutrition services for continuation of education as indicated or per pt preferences.     Please re-consult RD as indicated.

## 2024-02-29 NOTE — PROGRESS NOTES
Inpatient Anticoagulation Service Note for 2/28/2024    Reason for Anticoagulation: On-X Aortic Valve Replacement      Hemoglobin Value: (Abnormal) 11.9  Hematocrit Value: (Abnormal) 36.4  Lab Platelet Value: (Abnormal) 157  Target INR: 2.0 to 3.0    INR from last 7 days       Date/Time INR Value    02/28/24 0520 (Abnormal) 1.26    02/27/24 1150 (Abnormal) 1.45    02/27/24 0400 1.01    02/23/24 2018 0.94          Dose from last 7 days       Date/Time Dose (mg)    02/28/24 1609 2.5          Average Dose (mg):  (new start)  Significant Interactions: Antiplatelet Medications, Other (Comments) (Depakote)  Bridge Therapy: No   Reversal Agent Administered: Not Applicable    A/P  Starting warfarin following On-X aortic mechanical valve replacement.  Basline INR mildly elevated and patient also on Depakote which can cause an increased INR while on warfarin.  For this reasons will start patient on warfarin 2.5 mg daily for now.  INR tomorrow to guide subsequent dosing.    Education Material Provided?: No    Pharmacist suggested discharge dosing: TBD pending INR trends, perhaps warfarin 2.5 mg PO daily.  Recommend a follow-up PT/INR within 48-72 hours of discharge.    Rosemary Velarde, PharmD, BCPS, BCCCP

## 2024-02-29 NOTE — PROGRESS NOTES
Inpatient Anticoagulation Service Note for 2/29/2024      Reason for Anticoagulation: On-X Aortic Valve Replacement      Hemoglobin Value: (!) 11.7  Hematocrit Value: (!) 36.2  Lab Platelet Value: 165  Target INR: 2.0 to 3.0    INR from last 7 days       Date/Time INR Value    02/29/24 0130 1.24    02/28/24 0520 1.26    02/27/24 1150 1.45    02/27/24 0400 1.01    02/23/24 2018 0.94          Dose from last 7 days       Date/Time Dose (mg)    02/29/24 1225 2.5    02/28/24 1609 2.5          Average Dose (mg):  (new start)  Significant Interactions: Antiplatelet Medications, Other (Depakote)  Bridge Therapy: No   Reversal Agent Administered: Not Applicable    A/P: INR sub-therapeutic today. Since patient is a new start warfarin, will likely not see a jump in INR for 1-2 more days. Will give warfarin 2.5 mg again tonight as it is unclear how patient's INR will trend. Recheck INR tomorrow.    Education Material Provided?: No    Pharmacist suggested discharge dosing: TBD pending INR results. May be able to discharge patient on warfarin 2.5 mg PO daily. Recheck INR in 48-72 hours after discharge. Recommend referral to outpatient anticoagulation clinic for management.     Isidro Giraldo, AriellaD

## 2024-02-29 NOTE — CARE PLAN
Problem: Hyperinflation  Goal: Prevent or improve atelectasis  Description: Target End Date:  3 to 4 days    1. Instruct incentive spirometry usage  2.  Perform hyperinflation therapy as indicated  Outcome: Progressing  Flowsheets (Taken 2/27/2024 1711)  Hyperinflation Protocol Goals/Outcome: Increase and/or stable inspiratory capacity for 24 hours  Hyperinflation Protocol Indications: Abdominal/Thoracic Surgeries (Open Heart)  Note:     Respiratory Update    Treatment modality: PEP  Frequency: QID      Pt tolerating current treatments well with no adverse reactions.

## 2024-03-01 ENCOUNTER — APPOINTMENT (OUTPATIENT)
Dept: RADIOLOGY | Facility: MEDICAL CENTER | Age: 34
DRG: 218 | End: 2024-03-01
Attending: THORACIC SURGERY (CARDIOTHORACIC VASCULAR SURGERY)
Payer: COMMERCIAL

## 2024-03-01 LAB
ANION GAP SERPL CALC-SCNC: 8 MMOL/L (ref 7–16)
BUN SERPL-MCNC: 11 MG/DL (ref 8–22)
CALCIUM SERPL-MCNC: 8.3 MG/DL (ref 8.5–10.5)
CHLORIDE SERPL-SCNC: 95 MMOL/L (ref 96–112)
CO2 SERPL-SCNC: 30 MMOL/L (ref 20–33)
CREAT SERPL-MCNC: 0.49 MG/DL (ref 0.5–1.4)
EKG IMPRESSION: NORMAL
ERYTHROCYTE [DISTWIDTH] IN BLOOD BY AUTOMATED COUNT: 35 FL (ref 35.9–50)
GFR SERPLBLD CREATININE-BSD FMLA CKD-EPI: 138 ML/MIN/1.73 M 2
GLUCOSE SERPL-MCNC: 112 MG/DL (ref 65–99)
HCT VFR BLD AUTO: 31.6 % (ref 42–52)
HGB BLD-MCNC: 10.4 G/DL (ref 14–18)
INR PPP: 1.2 (ref 0.87–1.13)
MAGNESIUM SERPL-MCNC: 1.8 MG/DL (ref 1.5–2.5)
MCH RBC QN AUTO: 23.7 PG (ref 27–33)
MCHC RBC AUTO-ENTMCNC: 32.9 G/DL (ref 32.3–36.5)
MCV RBC AUTO: 72 FL (ref 81.4–97.8)
PLATELET # BLD AUTO: 155 K/UL (ref 164–446)
PMV BLD AUTO: 11.1 FL (ref 9–12.9)
POTASSIUM SERPL-SCNC: 3.5 MMOL/L (ref 3.6–5.5)
POTASSIUM SERPL-SCNC: 3.8 MMOL/L (ref 3.6–5.5)
PROTHROMBIN TIME: 15.3 SEC (ref 12–14.6)
RBC # BLD AUTO: 4.39 M/UL (ref 4.7–6.1)
SODIUM SERPL-SCNC: 133 MMOL/L (ref 135–145)
WBC # BLD AUTO: 11.5 K/UL (ref 4.8–10.8)

## 2024-03-01 PROCEDURE — 700111 HCHG RX REV CODE 636 W/ 250 OVERRIDE (IP): Mod: JZ | Performed by: NURSE PRACTITIONER

## 2024-03-01 PROCEDURE — 97535 SELF CARE MNGMENT TRAINING: CPT

## 2024-03-01 PROCEDURE — 93010 ELECTROCARDIOGRAM REPORT: CPT | Performed by: INTERNAL MEDICINE

## 2024-03-01 PROCEDURE — 97167 OT EVAL HIGH COMPLEX 60 MIN: CPT

## 2024-03-01 PROCEDURE — 94669 MECHANICAL CHEST WALL OSCILL: CPT

## 2024-03-01 PROCEDURE — 700102 HCHG RX REV CODE 250 W/ 637 OVERRIDE(OP): Performed by: NURSE PRACTITIONER

## 2024-03-01 PROCEDURE — 71046 X-RAY EXAM CHEST 2 VIEWS: CPT

## 2024-03-01 PROCEDURE — A9270 NON-COVERED ITEM OR SERVICE: HCPCS | Performed by: THORACIC SURGERY (CARDIOTHORACIC VASCULAR SURGERY)

## 2024-03-01 PROCEDURE — 99024 POSTOP FOLLOW-UP VISIT: CPT | Performed by: NURSE PRACTITIONER

## 2024-03-01 PROCEDURE — 83735 ASSAY OF MAGNESIUM: CPT

## 2024-03-01 PROCEDURE — 97530 THERAPEUTIC ACTIVITIES: CPT

## 2024-03-01 PROCEDURE — 85027 COMPLETE CBC AUTOMATED: CPT

## 2024-03-01 PROCEDURE — 770020 HCHG ROOM/CARE - TELE (206)

## 2024-03-01 PROCEDURE — 80048 BASIC METABOLIC PNL TOTAL CA: CPT

## 2024-03-01 PROCEDURE — 84132 ASSAY OF SERUM POTASSIUM: CPT

## 2024-03-01 PROCEDURE — 85610 PROTHROMBIN TIME: CPT

## 2024-03-01 PROCEDURE — A9270 NON-COVERED ITEM OR SERVICE: HCPCS | Performed by: NURSE PRACTITIONER

## 2024-03-01 PROCEDURE — 93005 ELECTROCARDIOGRAM TRACING: CPT | Performed by: THORACIC SURGERY (CARDIOTHORACIC VASCULAR SURGERY)

## 2024-03-01 PROCEDURE — 700102 HCHG RX REV CODE 250 W/ 637 OVERRIDE(OP): Performed by: THORACIC SURGERY (CARDIOTHORACIC VASCULAR SURGERY)

## 2024-03-01 RX ORDER — METOLAZONE 2.5 MG/1
2.5 TABLET ORAL
Status: DISCONTINUED | OUTPATIENT
Start: 2024-03-01 | End: 2024-03-02

## 2024-03-01 RX ORDER — WARFARIN SODIUM 4 MG/1
4 TABLET ORAL
Status: COMPLETED | OUTPATIENT
Start: 2024-03-01 | End: 2024-03-01

## 2024-03-01 RX ORDER — AMIODARONE HYDROCHLORIDE 200 MG/1
400 TABLET ORAL 2 TIMES DAILY
Status: DISCONTINUED | OUTPATIENT
Start: 2024-03-02 | End: 2024-03-03 | Stop reason: HOSPADM

## 2024-03-01 RX ADMIN — ASPIRIN 81 MG: 81 TABLET, COATED ORAL at 05:20

## 2024-03-01 RX ADMIN — Medication 1 APPLICATOR: at 20:51

## 2024-03-01 RX ADMIN — OXYCODONE 5 MG: 5 TABLET ORAL at 21:08

## 2024-03-01 RX ADMIN — DIVALPROEX SODIUM 500 MG: 500 TABLET, EXTENDED RELEASE ORAL at 20:51

## 2024-03-01 RX ADMIN — WARFARIN SODIUM 4 MG: 4 TABLET ORAL at 18:02

## 2024-03-01 RX ADMIN — ACETAMINOPHEN 1000 MG: 325 TABLET, FILM COATED ORAL at 23:32

## 2024-03-01 RX ADMIN — METOPROLOL TARTRATE 25 MG: 25 TABLET, FILM COATED ORAL at 17:03

## 2024-03-01 RX ADMIN — METOLAZONE 2.5 MG: 2.5 TABLET ORAL at 11:55

## 2024-03-01 RX ADMIN — DIVALPROEX SODIUM 500 MG: 500 TABLET, EXTENDED RELEASE ORAL at 09:07

## 2024-03-01 RX ADMIN — OMEPRAZOLE 20 MG: 20 CAPSULE, DELAYED RELEASE ORAL at 05:20

## 2024-03-01 RX ADMIN — DOCUSATE SODIUM 50 MG AND SENNOSIDES 8.6 MG 1 TABLET: 8.6; 5 TABLET, FILM COATED ORAL at 20:51

## 2024-03-01 RX ADMIN — AMIODARONE HYDROCHLORIDE 0.5 MG/MIN: 1.8 INJECTION, SOLUTION INTRAVENOUS at 15:02

## 2024-03-01 RX ADMIN — Medication 1 APPLICATOR: at 09:07

## 2024-03-01 RX ADMIN — POTASSIUM CHLORIDE 20 MEQ: 1500 TABLET, EXTENDED RELEASE ORAL at 05:22

## 2024-03-01 RX ADMIN — OXYCODONE HYDROCHLORIDE 10 MG: 10 TABLET ORAL at 09:08

## 2024-03-01 RX ADMIN — METOPROLOL TARTRATE 25 MG: 25 TABLET, FILM COATED ORAL at 05:19

## 2024-03-01 RX ADMIN — ACETAMINOPHEN 1000 MG: 325 TABLET, FILM COATED ORAL at 05:21

## 2024-03-01 RX ADMIN — ACETAMINOPHEN 1000 MG: 325 TABLET, FILM COATED ORAL at 16:57

## 2024-03-01 RX ADMIN — OXYCODONE HYDROCHLORIDE 10 MG: 10 TABLET ORAL at 02:01

## 2024-03-01 RX ADMIN — FUROSEMIDE 40 MG: 10 INJECTION INTRAMUSCULAR; INTRAVENOUS at 05:22

## 2024-03-01 RX ADMIN — ACETAMINOPHEN 1000 MG: 325 TABLET, FILM COATED ORAL at 11:55

## 2024-03-01 RX ADMIN — GUAIFENESIN 600 MG: 600 TABLET, EXTENDED RELEASE ORAL at 16:56

## 2024-03-01 RX ADMIN — OXYCODONE HYDROCHLORIDE 10 MG: 10 TABLET ORAL at 11:58

## 2024-03-01 RX ADMIN — OXYCODONE HYDROCHLORIDE 10 MG: 10 TABLET ORAL at 15:06

## 2024-03-01 RX ADMIN — ENOXAPARIN SODIUM 40 MG: 100 INJECTION SUBCUTANEOUS at 16:56

## 2024-03-01 RX ADMIN — POTASSIUM CHLORIDE 20 MEQ: 1500 TABLET, EXTENDED RELEASE ORAL at 16:56

## 2024-03-01 RX ADMIN — GUAIFENESIN 600 MG: 600 TABLET, EXTENDED RELEASE ORAL at 05:20

## 2024-03-01 RX ADMIN — OXYCODONE 5 MG: 5 TABLET ORAL at 18:02

## 2024-03-01 RX ADMIN — DOCUSATE SODIUM 100 MG: 100 CAPSULE, LIQUID FILLED ORAL at 16:56

## 2024-03-01 RX ADMIN — FUROSEMIDE 40 MG: 10 INJECTION INTRAMUSCULAR; INTRAVENOUS at 16:57

## 2024-03-01 ASSESSMENT — COGNITIVE AND FUNCTIONAL STATUS - GENERAL
MOVING TO AND FROM BED TO CHAIR: A LITTLE
CLIMB 3 TO 5 STEPS WITH RAILING: A LITTLE
WALKING IN HOSPITAL ROOM: A LITTLE
STANDING UP FROM CHAIR USING ARMS: A LITTLE
SUGGESTED CMS G CODE MODIFIER DAILY ACTIVITY: CH
SUGGESTED CMS G CODE MODIFIER MOBILITY: CK
MOVING FROM LYING ON BACK TO SITTING ON SIDE OF FLAT BED: A LOT
DAILY ACTIVITIY SCORE: 24
MOBILITY SCORE: 16
TURNING FROM BACK TO SIDE WHILE IN FLAT BAD: A LOT

## 2024-03-01 ASSESSMENT — GAIT ASSESSMENTS
DISTANCE (FEET): 250
GAIT LEVEL OF ASSIST: STANDBY ASSIST
DEVIATION: BRADYKINETIC
ASSISTIVE DEVICE: FRONT WHEEL WALKER

## 2024-03-01 ASSESSMENT — PAIN DESCRIPTION - PAIN TYPE: TYPE: SURGICAL PAIN

## 2024-03-01 ASSESSMENT — ACTIVITIES OF DAILY LIVING (ADL): TOILETING: INDEPENDENT

## 2024-03-01 ASSESSMENT — FIBROSIS 4 INDEX: FIB4 SCORE: 0.59

## 2024-03-01 NOTE — DISCHARGE PLANNING
Case Management Discharge Planning    Admission Date: 2/23/2024  GMLOS: 5.2  ALOS: 7    6-Clicks ADL Score: 24  6-Clicks Mobility Score: 11  PT and/or OT Eval ordered: Yes  Post-acute Referrals Ordered: Yes  Post-acute Choice Obtained: Yes  Has referral(s) been sent to post-acute provider:  Yes      Anticipated Discharge Dispo: Discharge Disposition: D/T to home under HHA care in anticipation of covered skilled care (06)    DME Needed: No    Action(s) Taken: DC Assessment Complete (See below) and Choice obtained    RN ZOILA  met with pt at bedside to complete assessment and discuss discharge planning.Pt A&Ox4 and able to verify the information on the face sheet.     Pt lives with his S/O in a two-story apartment but lives on the 1st floor, Pierre Lucia(p) [phone] is pt's S/O  and emergency contact.       Patient reports, prior to admission patient was independent with ADL's and IADL’s.Pt does not use any DME at baseline.      Pt reported that his S/O is good support for him. Pt reports PTA he worked for the [place] [employer] and has income of $4000/month.     The patient's PCP is Dr. Mohsen Tamasaby.  Patient's preferred pharmacy is [facility].    Patient denies a history of SNF/IPR nor HHC use in the past.  Patient denies tobacco and recreational drug use.  Pt denies any SA or MH concerns.      Patients confirmed medical coverage via [employer].  Patient has means to transportation and S/O will provide transport once medically stable for discharge.         RN discussed discharge planning.  Orders for Home Health placed by cardiovascular surg for INR draws. RN CM obtained choice for Home Health, 1; [facility], 2; [email or web address], 3; [facility]. Referral sent to patient's 1st choice.      1300- Patient accepted on service by [facility]. RN CM notified patient of acceptance.    Escalations Completed: None    Medically Clear: No    Next Steps: CM will F/U with medical team regarding D/C needs/ barriers.      Barriers to Discharge: Medical clearance and Outpatient referrals pending    Is the patient up for discharge tomorrow: No       Care Transition Team Assessment    Information Source  Orientation Level: Oriented X4  Information Given By: Patient  Informant's Name: Umesh  Who is responsible for making decisions for patient? : Patient    Readmission Evaluation  Is this a readmission?: No    Elopement Risk  Legal Hold: No  Ambulatory or Self Mobile in Wheelchair: Yes  Disoriented: No  Psychiatric Symptoms: None  History of Wandering: No  Elopement this Admit: No  Vocalizing Wanting to Leave: No  Displays Behaviors, Body Language Wanting to Leave: No-Not at Risk for Elopement  Elopement Risk: Not at Risk for Elopement    Interdisciplinary Discharge Planning  Lives with - Patient's Self Care Capacity: Spouse, Child Less than 18 Years of Age (3 children)  Patient or legal guardian wants to designate a caregiver: No  Support Systems: Friends / Neighbors, Children, Family Member(s), Spouse / Significant Other  Housing / Facility: 1 Story Apartment / Condo  Prior Services: Home-Independent  Durable Medical Equipment: Not Applicable    Discharge Preparedness  What is your plan after discharge?: Home health care  What are your discharge supports?: Other (comment) (Significant other)  Prior Functional Level: Ambulatory, Independent with Activities of Daily Living, Independent with Medication Management    Functional Assesment  Prior Functional Level: Ambulatory, Independent with Activities of Daily Living, Independent with Medication Management    Finances  Financial Barriers to Discharge: No  Prescription Coverage: Yes    Vision / Hearing Impairment  Vision Impairment : Yes  Right Eye Vision: Impaired, Wears Glasses  Left Eye Vision: Impaired, Wears Glasses  Hearing Impairment : No    Advance Directive  Advance Directive?: None  Advance Directive offered?: AD Booklet given    Domestic Abuse  Have you ever been the victim of  abuse or violence?: No  Physical Abuse or Sexual Abuse: No  Verbal Abuse or Emotional Abuse: No  Possible Abuse/Neglect Reported to:: Not Applicable    Psychological Assessment  History of Substance Abuse: None  History of Psychiatric Problems: No    Discharge Risks or Barriers  Discharge risks or barriers?: Post-acute placement / services  Patient risk factors: Complex medical needs    Anticipated Discharge Information  Discharge Disposition: D/T to home under A care in anticipation of covered skilled care (06)

## 2024-03-01 NOTE — CARE PLAN
The patient is Watcher - Medium risk of patient condition declining or worsening    Shift Goals  Clinical Goals: Monitor and wean O2, VSS, increase mobility, encourage IS use  Patient Goals: Go home soon and move with less pain  Family Goals: Safe discharge    Progress made toward(s) clinical / shift goals:    Problem: Pain - Standard  Goal: Alleviation of pain or a reduction in pain to the patient’s comfort goal  Outcome: Progressing  Note: Using numeric and non-verbal descriptors to rate patient's pain. Administering medications as ordered. Reassessing pain.     Problem: Knowledge Deficit - Standard  Goal: Patient and family/care givers will demonstrate understanding of plan of care, disease process/condition, diagnostic tests and medications  Outcome: Progressing  Note: Patient board updated. POC discussed, all questions answered at this time.     Problem: Post op day 2 CABG/Heart Valve Replacement  Goal: Optimal care of the post op CABG/heart valve replacement post op day 2  Outcome: Progressing  Intervention: Daily weights in the morning  Note: Morning weights obtained.  Intervention: IS q 1 hour while awake and record best IS volume  Note: Encouraging IS use while patient is awake. Patient educated on the importance of getting IS value back to baseline.  Intervention: Ambulate 4 times daily, increasing the distance each time  Note: Patient ambulated during twice during this shift.  Intervention: Stand at sink and wash up with assistance.  Clean incisions twice daily with soap and water.  Note: Site care provided with foaming cleanser and washcloth. Patient encouraged to participate in care.  Intervention: Consider pacer wire removal by MD  Note: Pacer wires removed prior to T8 arrival.  Intervention: Consider removal of vera and chest tube if not already done  Note: Vera removed prior to T8 arrival. Patient using urinal to void.       Patient is not progressing towards the following goals:

## 2024-03-01 NOTE — PROGRESS NOTES
Bedside report received. Patient A/Ox 4. VS /71. Oxygen requirements 2L. Telemetry monitoring SR. Complains of pain 10/10 with movement, patient sat to rest and repositioned. Patient previously medicated. POC discussed with patient. Pt verbalizes understanding. Call light and belongings within reach. Bed locked and lowest position and fall precautions in place.    Bedside report received from off going RN/tech: Roosevelt RN assumed care of patient.     Fall Risk Score: NO RISK  Fall risk interventions in place: Place yellow fall risk ID band on patient, Provide patient/family education based on risk assessment, Educate patient/family to call staff for assistance when getting out of bed, and Place fall precaution signage outside patient door  Bed type: Regular (Andrez Score less than 17 interventions in place)  Patient on cardiac monitor: Yes  IVF/IV medications: Not Applicable   Oxygen: How many liters 2L  Bedside sitter: Not Applicable   Isolation: Not applicable

## 2024-03-01 NOTE — PROGRESS NOTES
Cardiovascular Surgery Progress Note    Name: Umesh Chang  MRN: [id number]  : 1990  Admit Date: 2024  7:21 PM  3 Days Post-Op     Procedure:  Procedure(s) and Anesthesia Type:     * REPLACEMENT, AORTIC VALVE - General     * ECHOCARDIOGRAM, TRANSESOPHAGEAL, INTRAOPERATIVE - General     * ROOT ENLARGEMENT AORTA, ASCENDING - General    Vitals:  Vitals:    24 2142 24 2330 24 0402 24 0519   BP: 119/71 122/65 (!) 143/75 129/82   Pulse: 98 96 100 (!) 102   Resp: 18 18 18    Temp: 36.7 °C (98.1 °F) 36.8 °C (98.2 °F) 36.8 °C (98.2 °F)    TempSrc: Temporal Temporal Temporal    SpO2: 92% 93% 92%    Weight: 105 kg (230 lb 9.6 oz)  104 kg (229 lb 4.5 oz)    Height:          Temp (24hrs), Av.4 °C (97.6 °F), Min:35.9 °C (96.6 °F), Max:36.8 °C (98.2 °F)      Respiratory:    Respiration: 18, Pulse Oximetry: 92 %       Fluids:    Intake/Output Summary (Last 24 hours) at 3/1/2024 1034  Last data filed at 3/1/2024 0409  Gross per 24 hour   Intake 440 ml   Output 1600 ml   Net -1160 ml     Admit weight: Weight: 104 kg (230 lb)  Current weight: Weight: 104 kg (229 lb 4.5 oz) (24 0402)    Labs:  Recent Labs     24  0520 24  0130 24  0200   WBC 10.9* 13.8* 11.5*   RBC 4.97 4.92 4.39*   HEMOGLOBIN 11.9* 11.7* 10.4*   HEMATOCRIT 36.4* 36.2* 31.6*   MCV 73.2* 73.6* 72.0*   MCH 23.9* 23.8* 23.7*   MCHC 32.7 32.3 32.9   RDW 37.4 37.0 35.0*   PLATELETCT 157* 165 155*   MPV 11.2 11.9 11.1     Recent Labs     24  0520 24  0130 24  0200   SODIUM 138 136 133*   POTASSIUM 4.5 4.3 3.8   CHLORIDE 105 98 95*   CO2 24 26 30   GLUCOSE 133* 118* 112*   BUN 13 12 11   CREATININE 0.65 0.68 0.49*   CALCIUM 7.8* 8.2* 8.3*     Recent Labs     24  1150 24  0520 24  0130 24  0550   APTT 42.3*  --   --   --    INR 1.45* 1.26* 1.24* 1.20*       HgbA1c:  5.3    Diabetic Educator Consult:  no    Medications:  Scheduled Medications   Medication Dose Frequency     furosemide  40 mg BID    potassium chloride SA  20 mEq BID    guaiFENesin ER  600 mg Q12HRS    warfarin  2.5 mg DAILY AT 1800    enoxaparin (LOVENOX) injection  40 mg DAILY AT 1800    Nozin nasal  swab  1 Applicator BID    metoprolol tartrate  25 mg BID    aspirin  81 mg DAILY    Pharmacy Consult Request  1 Each PHARMACY TO DOSE    acetaminophen  1,000 mg Q6HRS    docusate sodium  100 mg BID    senna-docusate  1 Tablet Nightly    omeprazole  20 mg DAILY    divalproex ER  500 mg BID    MD Alert...Warfarin per Pharmacy   PHARMACY TO DOSE        Exam:   Physical Exam  Vitals and nursing note reviewed.   Constitutional:       General: He is not in acute distress.     Appearance: Normal appearance.   HENT:      Head: Normocephalic and atraumatic.      Right Ear: External ear normal.      Left Ear: External ear normal.      Nose: Nose normal.      Mouth/Throat:      Mouth: Mucous membranes are moist.   Eyes:      Extraocular Movements: Extraocular movements intact.      Conjunctiva/sclera: Conjunctivae normal.      Pupils: Pupils are equal, round, and reactive to light.   Cardiovascular:      Rate and Rhythm: Normal rate and regular rhythm.      Pulses: Normal pulses.      Heart sounds: Normal heart sounds.   Pulmonary:      Effort: Pulmonary effort is normal.   Abdominal:      General: Abdomen is flat.      Palpations: Abdomen is soft.   Musculoskeletal:         General: Normal range of motion.      Cervical back: Normal range of motion.      Right lower leg: Edema present.      Left lower leg: Edema present.   Skin:     General: Skin is warm and dry.      Capillary Refill: Capillary refill takes less than 2 seconds.      Comments: Sternum-Island Dressing   Neurological:      General: No focal deficit present.      Mental Status: He is alert and oriented to person, place, and time. Mental status is at baseline.   Psychiatric:         Mood and Affect: Mood normal.         Behavior: Behavior normal.          Cardiac Medications:    ASA - Yes    Plavix - No; contraindicated because of On Coumadin / NOAC    Post-operative Beta Blockers - Yes    Ace/ARB- No; contraindicated because of Normal EF    Statin - No; contraindicated because of No CAD    Aldactone- No; contraindicated because of Normal EF    SGLT2i-  No contraindicated because of No; contraindicated because of Normal EF    Ejection Fraction:  60%    Telemetry:   2/28 SR  2/29 SR  3/1 SR    Assessment/Plan:  POD 1 Extubated.  Off gtts.  Neuro intact.  Wounds CDI.  Abdomen soft nontender.  Cr 0.65 Hgb 11.9 Moderate output from mediastinal tubes.  Complaints of sore throat.  Plan: Cepacol lozenge PRN.  Keep mediastinal tubes and vera.  Start gentle diuresis.  Wean oxygen.  Encourage IS/ambulation.      POD 2 HDS. SR. Neuro intact.  Wounds CDI.  Mediastinal tubes removed yesterday.  Cr 0.68 Hgb 11.7.  Pacer wires removed.  Plan: Increase diuresis.  DC vera.  Transfer to tele.  Add mucinex. Encourage IS/ambulation.      POD 3 HDS. SR. Neuro intact.  Wounds CDI.  Abdomen soft -BM Cr 0.49 Hgb 10.4.  CXR negative.  Plan: Add metolazone.  Diurese.  Wean oxygen.  Encourage IS/ambulation.      Disposition:  PT home  FWW ordered

## 2024-03-01 NOTE — DISCHARGE PLANNING
Received Choice Form @: 8488  Agency/Facility Name: [facility]  Referral Sent Per Choice Form @: 7787

## 2024-03-01 NOTE — PROGRESS NOTES
4 Eyes Skin Assessment Completed by LARA Ramirez and LARA Rm.    Head WDL  Ears WDL  Nose WDL  Mouth WDL  Neck WDL  Breast/Chest Incision, midline and chest tube sites  Shoulder Blades WDL  Spine WDL  (R) Arm/Elbow/Hand WDL  (L) Arm/Elbow/Hand WDL  Abdomen WDL  Groin WDL  Scrotum/Coccyx/Buttocks WDL  (R) Leg Scab and Bruising  (L) Leg Scab and Bruising  (R) Heel/Foot/Toe Redness, Blanching, and Edema  (L) Heel/Foot/Toe Redness, Blanching, and Edema          Devices In Places Tele Box, Blood Pressure Cuff, Pulse Ox, and Nasal Cannula      Interventions In Place NC W/Ear Foams and Pillows    Possible Skin Injury No    Pictures Uploaded Into Epic N/A  Wound Consult Placed N/A  RN Wound Prevention Protocol Ordered No

## 2024-03-01 NOTE — DISCHARGE PLANNING
"Discharge Discussion and home care recap prior to discharge:      Discharge review was completed with patient and family.    Patient was reminded that outpatient cardiac rehab beginning 6 weeks post op. They were told it is highly recommended and available to them if desired, but not required. They were told to look at the provided flyer for the contact number and additional information.    Patient was reminded to utilize the vitals log book provided to take his/her weight daily and record.    Patient was told to call me with weight gain > 3 lbs in 1 day or 5 lbs in 1 week  Patient was also told to record heart rate and blood pressure morning and night; and to follow the hold parameters provided in the discharge summary on beta blockers and ACE/ARB (if prescribed on discharge).    Patient was reminded to review the \"recovery after heart surgery\" packet with information on normal expectations such as clicking in the chest, loud/pounding heart/ hot and cold flashes, weight loss, constipation, insomnia, tingling on left side of chest (CABG with LIMA).  I also reviewed the decision tree of whom to call and when with concerns after going home. RN navigator Monday through Friday during business hours for any questions or urgent concerns, and the office for urgent concerns at night or on the weekends.    I briefly recapped the discharge instructions that their primary RN will review in depth prior to discharge   1) appointments   2) medication reconciliation (start, continue, change, stop)   3) care instructions    All additional questions were answered or deferred to the bedside RN.    They were directed to email me any [facility] paperwork that needs to be filled out.    Event time 50 minutes minutes    "

## 2024-03-01 NOTE — CARE PLAN
Problem: Post op day 2 CABG/Heart Valve Replacement  Goal: Optimal care of the post op CABG/heart valve replacement post op day 2  Note: Done  Intervention: Daily weights in the morning  Note: Done  Intervention: Up in chair for all meals  Note: None  Intervention: IS q 1 hour while awake and record best IS volume  Note: 500 Is still the best pull on his IS.  Intervention: FSBS: when 2 consecutive BS < 130 after post op day 2, discontinue FSBS unless patient is insulin dependent diabetic  Note: DC'd  Intervention: Daily weights in the morning  Note: Patient was up in the chair for all meals.  Intervention: Up in chair for all meals  Note: Patient walked 3 times this shift about 200 feet each time. Patient tolerated well.  Intervention: Ambulate 4 times daily, increasing the distance each time  Note: Incision washed with soap and water. CHG bath given.  Intervention: Consider pacer wire removal by MD  Note: Removed by Db   The patient is Stable - Low risk of patient condition declining or worsening    Shift Goals  Clinical Goals: stable vitals, improve mobility, control pain  Patient Goals: pain control/ rest  Family Goals: recovery    Progress made toward(s) clinical / shift goals:  Tele orders      Problem: Pain - Standard  Goal: Alleviation of pain or a reduction in pain to the patient’s comfort goal  Outcome: Progressing

## 2024-03-01 NOTE — THERAPY
Occupational Therapy   Initial Evaluation     Patient Name: Umesh Chang  Age:  33 y.o., Sex:  male  Medical Record #: [id number]  Today's Date: 3/1/2024     Precautions  Precautions: Fall Risk, Cardiac Precautions (See Comments), Sternal Precautions (See Comments)  Comments: 2/27 s/p aortic valve replacement, aorta root enlargement    Assessment    Patient is 33 y.o. male admitted for chest pain x2 weeks s/p aortic valve replacement, aorta root enlargement. Pt normally independent with functional mobility and ADLs living in a GL apartment with spouse. Pt able to complete functional mobility and ADLs with supervision, reiterated sternal precautions and provided adaptive strategies for full body dressing. Anticipate no further OT needs at discharge.      Plan    DC Equipment Recommendations: (P) None  Discharge Recommendations: (P) Anticipate that the patient will have no further occupational therapy needs after discharge from the hospital     Objective       03/01/24 0904   Prior Living Situation   Prior Services Home-Independent   Housing / Facility 1 Story Apartment / Condo   Steps Into Home 1   Steps In Home 0   Bathroom Set up Bathtub / Shower Combination   Equipment Owned None   Lives with - Patient's Self Care Capacity Spouse;Child Less than 18 Years of Age   Prior Level of ADL Function   Self Feeding Independent   Grooming / Hygiene Independent   Bathing Independent   Dressing Independent   Toileting Independent   Prior Level of IADL Function   Medication Management Independent   Laundry Independent   Kitchen Mobility Independent   Finances Independent   Home Management Independent   Shopping Independent   Prior Level Of Mobility Independent Without Device in Community   Driving / Transportation Driving Independent   Occupation (Pre-Hospital Vocational) Employed Full Time  ([employer])   History of Falls   History of Falls No   Precautions   Precautions Fall Risk;Cardiac Precautions (See Comments);Sternal Precautions  (See Comments)   Pain 0 - 10 Group   Therapist Pain Assessment Post Activity Pain Same as Prior to Activity;Nurse Notified   Cognition    Cognition / Consciousness WDL   Level of Consciousness Alert   Comments Pleasant, cooperative, receptive to education   Active ROM Upper Body   Active ROM Upper Body  WDL   Dominant Hand Right   Strength Upper Body   Upper Body Strength  WDL   Sensation Upper Body   Upper Extremity Sensation  WDL   Upper Body Muscle Tone   Upper Body Muscle Tone  WDL   Neurological Concerns   Neurological Concerns No   Coordination Upper Body   Coordination WDL   Balance Assessment   Sitting Balance (Static) Fair +   Sitting Balance (Dynamic) Fair   Standing Balance (Static) Fair   Standing Balance (Dynamic) Fair   Weight Shift Sitting Fair   Weight Shift Standing Fair   Comments no AD for in room mobility   Bed Mobility    Supine to Sit Supervised   Scooting Supervised   Rolling Supervised   ADL Assessment   Grooming Supervision;Standing   Upper Body Dressing Supervision   Lower Body Dressing Supervision   Toileting   (NT-refused need)   How much help from another person does the patient currently need...   Putting on and taking off regular lower body clothing? 4   Bathing (including washing, rinsing, and drying)? 4   Toileting, which includes using a toilet, bedpan, or urinal? 4   Putting on and taking off regular upper body clothing? 4   Taking care of personal grooming such as brushing teeth? 4   Eating meals? 4   6 Clicks Daily Activity Score 24   Functional Mobility   Sit to Stand Supervised   Bed, Chair, Wheelchair Transfer Supervised   Transfer Method Stand Step   Mobility bed mobility, ADLs in room, left with PT   Comments no AD   Activity Tolerance   Sitting Edge of Bed left at EOB for PT   Standing 15 MIN   Education Group   Education Provided Role of Occupational Therapist;Sternal Precautions;Cardiac Precautions   Role of Occupational Therapist Patient Response  Patient;Acceptance;Explanation   Cardiac Precautions Patient Response Patient;Acceptance;Explanation   Sternal Precautions Patient Response Patient;Acceptance;Explanation;Handout   Problem List   Problem List None   Anticipated Discharge Equipment and Recommendations   DC Equipment Recommendations None   Discharge Recommendations Anticipate that the patient will have no further occupational therapy needs after discharge from the hospital   Interdisciplinary Plan of Care Collaboration   IDT Collaboration with  Nursing   Patient Position at End of Therapy Seated;Edge of Bed;Call Light within Reach;Tray Table within Reach;Phone within Reach   Collaboration Comments RN updated

## 2024-03-01 NOTE — THERAPY
"Physical Therapy   Daily Treatment     Patient Name: Umesh Chang  Age:  33 y.o., Sex:  male  Medical Record #: [id number]  Today's Date: 3/1/2024     Precautions  Precautions: (P) Fall Risk;Cardiac Precautions (See Comments);Sternal Precautions (See Comments)  Comments: (P) 2/27 s/p aortic valve replacement, aorta root enlargement    Assessment    Patient received in chair and agreeable to PT session. Extensive time at beginning of session re-discussing [facility] handout and answering all of pt's questions. Pt was able to mobilize at a SBA level. During ambulation, pt was able to intermittently perform talk test and took standing rest breaks as appropriate. Pt is currently limited by decreased activity tolerance. Anticipate as pt continues to progress in endurance and functional mobility, pt will be able to return home and follow up with OP Cardiac Rehab as appropriate. Will follow for acute care PT needs.     Plan    Treatment Plan Status: (P) Continue Current Treatment Plan  Type of Treatment: Bed Mobility, Equipment, Gait Training, Neuro Re-Education / Balance, Stair Training, Therapeutic Activities, Therapeutic Exercise  Treatment Frequency: 4 Times per Week  Treatment Duration: Until Therapy Goals Met    DC Equipment Recommendations: (P) Front-Wheel Walker  Discharge Recommendations: (P) Other - (Outpatient cardiac rehab)      Subjective    \"I feel much better today, just my incision hurts\".      Objective       03/01/24 0911   Precautions   Precautions Fall Risk;Cardiac Precautions (See Comments);Sternal Precautions (See Comments)   Comments 2/27 s/p aortic valve replacement, aorta root enlargement   Vitals   O2 (LPM) 2   O2 Delivery Device Nasal Cannula   Pain 0 - 10 Group   Therapist Pain Assessment Post Activity Pain Same as Prior to Activity;Nurse Notified  (pain not rated; reported worse incisional pain after ambulation)   Cognition    Cognition / Consciousness WDL   Level of Consciousness Alert   Comments pleasant " and cooperative, receptive to education   Active ROM Lower Body    Active ROM Lower Body  WDL   Strength Lower Body   Lower Body Strength  WDL   Sensation Lower Body   Lower Extremity Sensation   WDL   Lower Body Muscle Tone   Lower Body Muscle Tone  WDL   Balance   Sitting Balance (Static) Fair +   Sitting Balance (Dynamic) Fair   Standing Balance (Static) Fair   Standing Balance (Dynamic) Fair   Weight Shift Sitting Fair   Weight Shift Standing Fair   Skilled Intervention Verbal Cuing;Compensatory Strategies   Comments w/FWW   Bed Mobility    Comments in chair pre/post session; pt reports overall bed mobility is going well, still some difficulty   Gait Analysis   Gait Level Of Assist Standby Assist   Assistive Device Front Wheel Walker   Distance (Feet) 250   # of Times Distance was Traveled 1   Deviation Bradykinetic   # of Stairs Climbed 0   Weight Bearing Status no restrictions   Skilled Intervention Verbal Cuing   Functional Mobility   Sit to Stand Standby Assist   Bed, Chair, Wheelchair Transfer Standby Assist   Mobility chair <> ambulate in hallway   Skilled Intervention Verbal Cuing   6 Clicks Assessment - How much HELP from from another person do you currently need... (If the patient hasn't done an activity recently, how much help from another person do you think he/she would need if he/she tried?)   Turning from your back to your side while in a flat bed without using bedrails? 2   Moving from lying on your back to sitting on the side of a flat bed without using bedrails? 2   Moving to and from a bed to a chair (including a wheelchair)? 3   Standing up from a chair using your arms (e.g., wheelchair, or bedside chair)? 3   Walking in hospital room? 3   Climbing 3-5 steps with a railing? 3   6 clicks Mobility Score 16   Short Term Goals    Short Term Goal # 1 supine to/from sit flat bed supervised in 6 visits to progress with bed mobility   Goal Outcome # 1 Other (see comments)  (not assessed this session)    Short Term Goal # 2 sit to/from stand EOB with FWW supervised in 6 visits to progress with transfers   Goal Outcome # 2 Progressing as expected   Short Term Goal # 3 amb 6 minutes with FWW supervised in 6 visits for functional distances   Goal Outcome # 3 Progressing as expected   Short Term Goal # 4 Pt will be able to teach back and demonstrate mobility within sternal precautions, able to refer to decision tree, and ask questions about Cardiac Rehab Phase I within 6 visits.   Goal Outcome # 4 Progressing as expected   Short Term Goal # 5 navigate 1 step with CGA in 6 visits to access apartment   Goal Outcome # 5 Other (see comments)  (not assessed this session)   Education Group   Education Provided Role of Physical Therapist;Cardiac Precautions;Sternal Precautions   Cardiac Precautions Patient Response Patient;Acceptance;Explanation;Handout;Verbal Demonstration   Sternal Precautions Patient Response Patient;Acceptance;Explanation;Handout;Verbal Demonstration   Role of Physical Therapist Patient Response Patient;Acceptance;Explanation;Verbal Demonstration   Physical Therapy Treatment Plan   Physical Therapy Treatment Plan Continue Current Treatment Plan   Anticipated Discharge Equipment and Recommendations   DC Equipment Recommendations Front-Wheel Walker   Discharge Recommendations Other -  (Outpatient cardiac rehab)   Interdisciplinary Plan of Care Collaboration   IDT Collaboration with  Nursing   Patient Position at End of Therapy Seated;Call Light within Reach;Tray Table within Reach;Phone within Reach   Collaboration Comments RN updated   Session Information   Date / Session Number  3/1 - 2 (2/4, 3/5)

## 2024-03-02 ENCOUNTER — APPOINTMENT (OUTPATIENT)
Dept: RADIOLOGY | Facility: MEDICAL CENTER | Age: 34
DRG: 218 | End: 2024-03-02
Attending: NURSE PRACTITIONER
Payer: COMMERCIAL

## 2024-03-02 LAB
ANION GAP SERPL CALC-SCNC: 11 MMOL/L (ref 7–16)
BUN SERPL-MCNC: 10 MG/DL (ref 8–22)
CALCIUM SERPL-MCNC: 8.6 MG/DL (ref 8.5–10.5)
CHLORIDE SERPL-SCNC: 93 MMOL/L (ref 96–112)
CO2 SERPL-SCNC: 34 MMOL/L (ref 20–33)
CREAT SERPL-MCNC: 0.57 MG/DL (ref 0.5–1.4)
ERYTHROCYTE [DISTWIDTH] IN BLOOD BY AUTOMATED COUNT: 35.4 FL (ref 35.9–50)
GFR SERPLBLD CREATININE-BSD FMLA CKD-EPI: 132 ML/MIN/1.73 M 2
GLUCOSE SERPL-MCNC: 114 MG/DL (ref 65–99)
HCT VFR BLD AUTO: 31 % (ref 42–52)
HGB BLD-MCNC: 10.2 G/DL (ref 14–18)
INR PPP: 1.16 (ref 0.87–1.13)
MCH RBC QN AUTO: 23.3 PG (ref 27–33)
MCHC RBC AUTO-ENTMCNC: 32.9 G/DL (ref 32.3–36.5)
MCV RBC AUTO: 70.9 FL (ref 81.4–97.8)
PLATELET # BLD AUTO: 218 K/UL (ref 164–446)
PMV BLD AUTO: 11.2 FL (ref 9–12.9)
POTASSIUM SERPL-SCNC: 3.1 MMOL/L (ref 3.6–5.5)
PROTHROMBIN TIME: 15 SEC (ref 12–14.6)
RBC # BLD AUTO: 4.37 M/UL (ref 4.7–6.1)
SODIUM SERPL-SCNC: 138 MMOL/L (ref 135–145)
WBC # BLD AUTO: 8.4 K/UL (ref 4.8–10.8)

## 2024-03-02 PROCEDURE — 85610 PROTHROMBIN TIME: CPT

## 2024-03-02 PROCEDURE — 80048 BASIC METABOLIC PNL TOTAL CA: CPT

## 2024-03-02 PROCEDURE — 700102 HCHG RX REV CODE 250 W/ 637 OVERRIDE(OP): Mod: JZ | Performed by: NURSE PRACTITIONER

## 2024-03-02 PROCEDURE — 700111 HCHG RX REV CODE 636 W/ 250 OVERRIDE (IP): Mod: JZ | Performed by: NURSE PRACTITIONER

## 2024-03-02 PROCEDURE — 94669 MECHANICAL CHEST WALL OSCILL: CPT

## 2024-03-02 PROCEDURE — 85027 COMPLETE CBC AUTOMATED: CPT

## 2024-03-02 PROCEDURE — 94760 N-INVAS EAR/PLS OXIMETRY 1: CPT

## 2024-03-02 PROCEDURE — 770020 HCHG ROOM/CARE - TELE (206)

## 2024-03-02 PROCEDURE — A9270 NON-COVERED ITEM OR SERVICE: HCPCS | Performed by: THORACIC SURGERY (CARDIOTHORACIC VASCULAR SURGERY)

## 2024-03-02 PROCEDURE — 700102 HCHG RX REV CODE 250 W/ 637 OVERRIDE(OP): Performed by: NURSE PRACTITIONER

## 2024-03-02 PROCEDURE — A9270 NON-COVERED ITEM OR SERVICE: HCPCS | Performed by: NURSE PRACTITIONER

## 2024-03-02 PROCEDURE — 71046 X-RAY EXAM CHEST 2 VIEWS: CPT

## 2024-03-02 PROCEDURE — A9270 NON-COVERED ITEM OR SERVICE: HCPCS | Mod: JZ | Performed by: NURSE PRACTITIONER

## 2024-03-02 PROCEDURE — 700102 HCHG RX REV CODE 250 W/ 637 OVERRIDE(OP): Performed by: THORACIC SURGERY (CARDIOTHORACIC VASCULAR SURGERY)

## 2024-03-02 RX ORDER — POTASSIUM CHLORIDE 20 MEQ/1
40 TABLET, EXTENDED RELEASE ORAL 3 TIMES DAILY
Status: DISCONTINUED | OUTPATIENT
Start: 2024-03-02 | End: 2024-03-03 | Stop reason: HOSPADM

## 2024-03-02 RX ORDER — FUROSEMIDE 10 MG/ML
20 INJECTION INTRAMUSCULAR; INTRAVENOUS
Status: DISCONTINUED | OUTPATIENT
Start: 2024-03-03 | End: 2024-03-03 | Stop reason: HOSPADM

## 2024-03-02 RX ORDER — WARFARIN SODIUM 5 MG/1
5 TABLET ORAL DAILY
Status: DISCONTINUED | OUTPATIENT
Start: 2024-03-02 | End: 2024-03-02

## 2024-03-02 RX ORDER — WARFARIN SODIUM 6 MG/1
6 TABLET ORAL DAILY
Status: DISCONTINUED | OUTPATIENT
Start: 2024-03-02 | End: 2024-03-03

## 2024-03-02 RX ADMIN — AMIODARONE HYDROCHLORIDE 400 MG: 200 TABLET ORAL at 16:43

## 2024-03-02 RX ADMIN — Medication 1 APPLICATOR: at 08:17

## 2024-03-02 RX ADMIN — METOPROLOL TARTRATE 25 MG: 25 TABLET, FILM COATED ORAL at 04:54

## 2024-03-02 RX ADMIN — OXYCODONE 5 MG: 5 TABLET ORAL at 21:43

## 2024-03-02 RX ADMIN — AMIODARONE HYDROCHLORIDE 0.5 MG/MIN: 1.8 INJECTION, SOLUTION INTRAVENOUS at 02:22

## 2024-03-02 RX ADMIN — ENOXAPARIN SODIUM 40 MG: 100 INJECTION SUBCUTANEOUS at 16:44

## 2024-03-02 RX ADMIN — METOLAZONE 2.5 MG: 2.5 TABLET ORAL at 04:53

## 2024-03-02 RX ADMIN — METOPROLOL TARTRATE 25 MG: 25 TABLET, FILM COATED ORAL at 16:43

## 2024-03-02 RX ADMIN — FUROSEMIDE 40 MG: 10 INJECTION INTRAMUSCULAR; INTRAVENOUS at 05:06

## 2024-03-02 RX ADMIN — OXYCODONE HYDROCHLORIDE 10 MG: 10 TABLET ORAL at 04:53

## 2024-03-02 RX ADMIN — ACETAMINOPHEN 1000 MG: 325 TABLET, FILM COATED ORAL at 04:57

## 2024-03-02 RX ADMIN — OXYCODONE 5 MG: 5 TABLET ORAL at 16:54

## 2024-03-02 RX ADMIN — DOCUSATE SODIUM 100 MG: 100 CAPSULE, LIQUID FILLED ORAL at 04:54

## 2024-03-02 RX ADMIN — Medication 1 APPLICATOR: at 21:43

## 2024-03-02 RX ADMIN — AMIODARONE HYDROCHLORIDE 400 MG: 200 TABLET ORAL at 05:04

## 2024-03-02 RX ADMIN — DIVALPROEX SODIUM 500 MG: 500 TABLET, EXTENDED RELEASE ORAL at 21:43

## 2024-03-02 RX ADMIN — DOCUSATE SODIUM 100 MG: 100 CAPSULE, LIQUID FILLED ORAL at 16:43

## 2024-03-02 RX ADMIN — GUAIFENESIN 600 MG: 600 TABLET, EXTENDED RELEASE ORAL at 04:56

## 2024-03-02 RX ADMIN — ACETAMINOPHEN 1000 MG: 325 TABLET, FILM COATED ORAL at 11:45

## 2024-03-02 RX ADMIN — OXYCODONE 5 MG: 5 TABLET ORAL at 13:25

## 2024-03-02 RX ADMIN — WARFARIN SODIUM 6 MG: 6 TABLET ORAL at 16:44

## 2024-03-02 RX ADMIN — OMEPRAZOLE 20 MG: 20 CAPSULE, DELAYED RELEASE ORAL at 04:55

## 2024-03-02 RX ADMIN — POTASSIUM CHLORIDE 40 MEQ: 1500 TABLET, EXTENDED RELEASE ORAL at 16:43

## 2024-03-02 RX ADMIN — GUAIFENESIN 600 MG: 600 TABLET, EXTENDED RELEASE ORAL at 16:43

## 2024-03-02 RX ADMIN — ACETAMINOPHEN 1000 MG: 325 TABLET, FILM COATED ORAL at 16:42

## 2024-03-02 RX ADMIN — POTASSIUM CHLORIDE 20 MEQ: 1500 TABLET, EXTENDED RELEASE ORAL at 04:58

## 2024-03-02 RX ADMIN — DOCUSATE SODIUM 50 MG AND SENNOSIDES 8.6 MG 1 TABLET: 8.6; 5 TABLET, FILM COATED ORAL at 21:43

## 2024-03-02 RX ADMIN — DIVALPROEX SODIUM 500 MG: 500 TABLET, EXTENDED RELEASE ORAL at 08:17

## 2024-03-02 RX ADMIN — POTASSIUM CHLORIDE 40 MEQ: 1500 TABLET, EXTENDED RELEASE ORAL at 11:46

## 2024-03-02 RX ADMIN — ASPIRIN 81 MG: 81 TABLET, COATED ORAL at 04:55

## 2024-03-02 ASSESSMENT — PAIN DESCRIPTION - PAIN TYPE
TYPE: SURGICAL PAIN
TYPE: SURGICAL PAIN

## 2024-03-02 ASSESSMENT — FIBROSIS 4 INDEX: FIB4 SCORE: 0.42

## 2024-03-02 NOTE — CARE PLAN
The patient is Stable - Low risk of patient condition declining or worsening    Shift Goals  Clinical Goals: Wean O2, ambulation, shower  Patient Goals: Pain management  Family Goals: Safe discharge    Progress made toward(s) clinical / shift goals:        Problem: Post Op Day 4 CABG/Heart Valve Replacement  Goal: Optimal care of the Post Op CABG/Heart Valve replacement Post Op Day 4  Outcome: Progressing  Intervention: Up in chair for all meals  Note: Pt tolerating sitting up in the chair for all meals.   Intervention: Shower daily and clean incisions twice daily with soap and water  Note: Pt showered and educated on care for incision sites. Pt was participatory in his care for showering, asking questions and maintaining sternal precautions    Intervention: Ambulate 4 times daily, increasing the distance each time  Note: Pt was able to tolerate ambulating around the whole unit. Pt walked multiple times today.  Intervention: IS q 1 hour while awake and record best IS volume  Note: Pt using IS multiple times. Best attempt was 850 after recent walk.   Intervention: Discharge Education  Note: Pt educated about care once going home. How to maintain clean incisions, ambulated as tolerated, and diet.

## 2024-03-02 NOTE — CARE PLAN
The patient is Watcher - Medium risk of patient condition declining or worsening    Shift Goals  Clinical Goals: Amio gtt, ambulation, IS use and wean O2  Patient Goals: Reduce pain  Family Goals: Safe discharge    Progress made toward(s) clinical / shift goals:    Problem: Pain - Standard  Goal: Alleviation of pain or a reduction in pain to the patient’s comfort goal  Outcome: Progressing  Note: Using numeric pain scale and non-verbal descriptors to rate patient's pain. Administering pain medications as ordered. Reassessing pain. Offering patient non-pharmacologic methods.      Problem: Post Op Day 3 CABG/Heart Valve replacement  Goal: Optimal care of the post op CABG/Heart Valve replacement post op day 3  Outcome: Progressing  Intervention: Daily weights in the morning  Note: Standing morning weight to be obtained after morning ambulation.  Intervention: IS q 1 hour while awake and record best IS volume  Note: Reminding and encouraging patient to use IS while awake. Patient best tonight after walk was 750.  Intervention: Ambulate 4 times daily, increasing the distance each time  Note: Patient ambulated twice during this shift.  Intervention: Shower daily and clean incisions twice daily with soap and water  Note: Incision sites cleansed during this shift. Patient participation encouraged.  Intervention: Consider removal of vera, chest tube and pacer wires if not already done  Note: Vera, pacer wires, and chest tube removed prior to T8 arrival.       Patient is not progressing towards the following goals:

## 2024-03-02 NOTE — PROGRESS NOTES
Inpatient Anticoagulation Service Note for 3/2/2024      Reason for Anticoagulation: On-X Aortic Valve Replacement     Hemoglobin Value: (!) 10.2  Hematocrit Value: (!) 31  Lab Platelet Value: 218  Target INR: 2.0 to 3.0    INR from last 7 days       Date/Time INR Value    03/02/24 0615 1.16    03/01/24 0550 1.2    02/29/24 0130 1.24    02/28/24 0520 1.26    02/27/24 1150 1.45    02/27/24 0400 1.01          Dose from last 7 days       Date/Time Dose (mg)    03/02/24 0953 6    03/01/24 1610 4    02/29/24 1225 2.5    02/28/24 1609 2.5          Average Dose (mg):  (new start)  Significant Interactions: Amiodarone  Bridge Therapy: No   Reversal Agent Administered: Not Applicable    Assessment:  Warfarin initiated on 2/28/24 following On-X aortic mechanical valve replacement. Dose initiated at 2.5 mg x2 days without any changes in INR. Dose was subsequently increased last night to 4 mg x1 - expect to see changes from increased dose tomorrow morning.   CBC/BMP stable this morning  Remains on cardiac diet  New DDI noted - amiodarone initiated on 3/1/24 - can increase INR significantly, requiring 30-50% warfarin dose reduction.   INR remains sub-therapeutic this morning & fairly unchanged from initiation 3 days ago.     Plan:    Anticipating to see a larger jump from new amiodarone DDI and increased dose last night.   Increase warfarin dose again tonight to 6 mg x1 and re-check INR tomorrow morning for further guidance.     Education Material Provided?: No    Pharmacist suggested discharge dosing: TBD pending INR trends over the next few days. For now (if patient is discharged today) - recommend discharging on  Warfarin 6 mg x1 on 3/2 followed by 5 mg PO daily starting 3/3. Patient will need close INR follow up within 48 hours of discharge.      Rea Hernandez, PharmD  [id number]

## 2024-03-02 NOTE — PROGRESS NOTES
Inpatient Anticoagulation Service Note for 3/1/2024      Reason for Anticoagulation: On-X Aortic Valve Replacement           Hemoglobin Value: (!) 10.4  Hematocrit Value: (!) 31.6  Lab Platelet Value: (!) 155  Target INR: 2.0 to 3.0    INR from last 7 days       Date/Time INR Value    03/01/24 0550 1.2    02/29/24 0130 1.24    02/28/24 0520 1.26    02/27/24 1150 1.45    02/27/24 0400 1.01    02/23/24 2018 0.94          Dose from last 7 days       Date/Time Dose (mg)    03/01/24 1610 5    02/29/24 1225 2.5    02/28/24 1609 2.5          Average Dose (mg):  (new start)  Significant Interactions: Antiplatelet Medications, Amiodarone, Other (Comments)  Bridge Therapy: No     Reversal Agent Administered: Not Applicable  Comments: INR is subtherapeutic today and is trending down after receiving smaller doses. Of note, patient started on amiodarone today, which can increase INR. Will increase to 4 mg tonight and recheck INR in AM.    Plan:  4 mg once; INR with AM labs  Education Material Provided?: No    Pharmacist suggested discharge dosing: TBD pending INR results. Recheck INR in 48-72 hours after discharge. Recommend referral to outpatient anticoagulation clinic for management.        Adelaide Quach, PharmD

## 2024-03-02 NOTE — PROGRESS NOTES
Bedside report received from off going RN/tech: Ashley RN, assumed care of patient.     Fall Risk Score: LOW RISK  Fall risk interventions in place: Place yellow fall risk ID band on patient, Provide patient/family education based on risk assessment, Educate patient/family to call staff for assistance when getting out of bed, Place fall precaution signage outside patient door, and Place patient in room close to nursing station  Bed type: Regular (Andrez Score less than 17 interventions in place)  Patient on cardiac monitor: Yes  IVF/IV medications: Not Applicable   Oxygen: How many liters 1L and Traced the line to wall oxygen  Bedside sitter: Not Applicable   Isolation: Not applicable    Pt sleeping in bed, bed in low and locked position, call light within reach, will continue to monitor.

## 2024-03-02 NOTE — PROGRESS NOTES
1415: RN contacted by monitor room who reported that pt -150's. RN assessed pt, pt is asymptomatic. Upon palpation HR is fast and irregular. STAT EKG ordered per protocol r/t rhythm change. EKG showed Afib with a rate of 104. Db Caterina contacted to inform that RN was starting amio gtt per protocol. APRN ordered bolus dose amiodarone 150 mg IVPB once now in addition to amio gtt protocol.     1430: Before RN started Amio gtt, pt was inspected on monitor and appeared to be in SR. APRN notified. APRN ordered to modify amio gtt to 0.5mg/min for 12 hours only. APRN discontinued amio bolus and ordered to continue protocol otherwise as normal. No further orders at this time.

## 2024-03-02 NOTE — PROGRESS NOTES
Bedside report received. Patient A/Ox 4. VS -140's. Oxygen requirements 1L NC. Telemetry monitoring SR. Complains of pain 5/10, patient medicated per MAR. POC discussed with patient. Pt verbalizes understanding. Call light and belongings within reach. Bed locked and lowest position and fall precautions in place.    Bedside report received from off going RN/tech: LARA Murillo assumed care of patient.     Fall Risk Score: LOW RISK  Fall risk interventions in place: Place yellow fall risk ID band on patient, Provide patient/family education based on risk assessment, Educate patient/family to call staff for assistance when getting out of bed, and Place fall precaution signage outside patient door  Bed type: Regular (Andrez Score less than 17 interventions in place)  Patient on cardiac monitor: Yes  IVF/IV medications: Infusion per MAR (List Med(s)) Amio  0.5 mg/min  Oxygen: How many liters 1L  Bedside sitter: Not Applicable   Isolation: Not applicable

## 2024-03-02 NOTE — PROGRESS NOTES
Cardiovascular Surgery Progress Note    Name: Umesh Chang  MRN: [id number]  : 1990  Admit Date: 2024  7:21 PM  4 Days Post-Op     Procedure:  Procedure(s) and Anesthesia Type:     * REPLACEMENT, AORTIC VALVE - General     * ECHOCARDIOGRAM, TRANSESOPHAGEAL, INTRAOPERATIVE - General     * ROOT ENLARGEMENT AORTA, ASCENDING - General    Vitals:  Vitals:    24 0553 24 0821 24 0825 24 0925   BP:  104/60     Pulse:  72  76   Resp:  17  18   Temp:       TempSrc:  Temporal     SpO2:  (!) 87% 92% 90%   Weight: 100 kg (221 lb 5.5 oz)      Height:          Temp (24hrs), Av.6 °C (97.8 °F), Min:36.4 °C (97.5 °F), Max:36.7 °C (98.1 °F)      Respiratory:    Respiration: 18, Pulse Oximetry: 90 %       Fluids:    Intake/Output Summary (Last 24 hours) at 3/2/2024 1007  Last data filed at 3/2/2024 0938  Gross per 24 hour   Intake 236 ml   Output 4050 ml   Net -3814 ml     Admit weight: Weight: 104 kg (230 lb)  Current weight: Weight: 100 kg (221 lb 5.5 oz) (24 0553)    Labs:  Recent Labs     24  0130 24  0200 24  013   WBC 13.8* 11.5* 8.4   RBC 4.92 4.39* 4.37*   HEMOGLOBIN 11.7* 10.4* 10.2*   HEMATOCRIT 36.2* 31.6* 31.0*   MCV 73.6* 72.0* 70.9*   MCH 23.8* 23.7* 23.3*   MCHC 32.3 32.9 32.9   RDW 37.0 35.0* 35.4*   PLATELETCT 165 155* 218   MPV 11.9 11.1 11.2     Recent Labs     24  0130 24  0200 24  1416 24  013   SODIUM 136 133*  --  138   POTASSIUM 4.3 3.8 3.5* 3.1*   CHLORIDE 98 95*  --  93*   CO2 26 30  --  34*   GLUCOSE 118* 112*  --  114*   BUN 12 11  --  10   CREATININE 0.68 0.49*  --  0.57   CALCIUM 8.2* 8.3*  --  8.6     Recent Labs     24  0130 24  0550 24  0615   INR 1.24* 1.20* 1.16*       HgbA1c:  5.3    Diabetic Educator Consult:  no    Medications:  Scheduled Medications   Medication Dose Frequency    warfarin  6 mg DAILY AT 1800    [START ON 3/3/2024] furosemide  20 mg Q DAY    potassium chloride SA  40 mEq TID     amiodarone  400 mg BID    guaiFENesin ER  600 mg Q12HRS    enoxaparin (LOVENOX) injection  40 mg DAILY AT 1800    Nozin nasal  swab  1 Applicator BID    metoprolol tartrate  25 mg BID    aspirin  81 mg DAILY    Pharmacy Consult Request  1 Each PHARMACY TO DOSE    acetaminophen  1,000 mg Q6HRS    docusate sodium  100 mg BID    senna-docusate  1 Tablet Nightly    omeprazole  20 mg DAILY    divalproex ER  500 mg BID    MD Alert...Warfarin per Pharmacy   PHARMACY TO DOSE        Exam:   Physical Exam  Vitals and nursing note reviewed.   Constitutional:       General: He is not in acute distress.     Appearance: Normal appearance.   HENT:      Head: Normocephalic and atraumatic.      Right Ear: External ear normal.      Left Ear: External ear normal.      Nose: Nose normal.      Mouth/Throat:      Mouth: Mucous membranes are moist.   Eyes:      Extraocular Movements: Extraocular movements intact.      Conjunctiva/sclera: Conjunctivae normal.      Pupils: Pupils are equal, round, and reactive to light.   Cardiovascular:      Rate and Rhythm: Normal rate and regular rhythm.      Pulses: Normal pulses.      Heart sounds: Normal heart sounds.   Pulmonary:      Effort: Pulmonary effort is normal.   Abdominal:      General: Abdomen is flat.      Palpations: Abdomen is soft.   Musculoskeletal:         General: Normal range of motion.      Cervical back: Normal range of motion.   Skin:     General: Skin is warm and dry.      Capillary Refill: Capillary refill takes less than 2 seconds.      Comments: Sternum-[place]land Dressing   Neurological:      General: No focal deficit present.      Mental Status: He is alert and oriented to person, place, and time. Mental status is at baseline.   Psychiatric:         Mood and Affect: Mood normal.         Behavior: Behavior normal.         Cardiac Medications:    ASA - Yes    Plavix - No; contraindicated because of On Coumadin / NOAC    Post-operative Beta Blockers - Yes    Ace/ARB- No;  contraindicated because of Normal EF    Statin - No; contraindicated because of No CAD    Aldactone- No; contraindicated because of Normal EF    SGLT2i-  No contraindicated because of No; contraindicated because of Normal EF    Ejection Fraction:  60%    Telemetry:   2/28 SR  2/29 SR  3/1 SR  3/2 SR    Assessment/Plan:  POD 1 Extubated.  Off gtts.  Neuro intact.  Wounds CDI.  Abdomen soft nontender.  Cr 0.65 Hgb 11.9 Moderate output from mediastinal tubes.  Complaints of sore throat.  Plan: Cepacol lozenge PRN.  Keep mediastinal tubes and vera.  Start gentle diuresis.  Wean oxygen.  Encourage IS/ambulation.      POD 2 HDS. SR. Neuro intact.  Wounds CDI.  Mediastinal tubes removed yesterday.  Cr 0.68 Hgb 11.7.  Pacer wires removed.  Plan: Increase diuresis.  DC vera.  Transfer to tele.  Add mucinex. Encourage IS/ambulation.      POD 3 HDS. SR. Neuro intact.  Wounds CDI.  Abdomen soft -BM Cr 0.49 Hgb 10.4.  CXR negative.  Plan: Add metolazone.  Diurese.  Wean oxygen.  Encourage IS/ambulation.      POD 4 HDS, SR- on po amio, diuresed well- decrease, replace K+, Mechanical valve- INR 1.17- on coumadin, 2 lnc- ck CXR, amb/enc IS, plan home 1-2 days when INR increased    Disposition:  PT home  FWW ordered

## 2024-03-03 ENCOUNTER — PHARMACY VISIT (OUTPATIENT)
Dept: PHARMACY | Facility: MEDICAL CENTER | Age: 34
End: 2024-03-03
Payer: COMMERCIAL

## 2024-03-03 VITALS
TEMPERATURE: 97.9 F | DIASTOLIC BLOOD PRESSURE: 73 MMHG | WEIGHT: 215.61 LBS | OXYGEN SATURATION: 91 % | HEIGHT: 71 IN | BODY MASS INDEX: 30.19 KG/M2 | SYSTOLIC BLOOD PRESSURE: 118 MMHG | HEART RATE: 82 BPM | RESPIRATION RATE: 16 BRPM

## 2024-03-03 LAB
ANION GAP SERPL CALC-SCNC: 11 MMOL/L (ref 7–16)
BUN SERPL-MCNC: 13 MG/DL (ref 8–22)
CALCIUM SERPL-MCNC: 9 MG/DL (ref 8.5–10.5)
CHLORIDE SERPL-SCNC: 92 MMOL/L (ref 96–112)
CO2 SERPL-SCNC: 33 MMOL/L (ref 20–33)
CREAT SERPL-MCNC: 0.62 MG/DL (ref 0.5–1.4)
ERYTHROCYTE [DISTWIDTH] IN BLOOD BY AUTOMATED COUNT: 34.7 FL (ref 35.9–50)
GFR SERPLBLD CREATININE-BSD FMLA CKD-EPI: 129 ML/MIN/1.73 M 2
GLUCOSE SERPL-MCNC: 104 MG/DL (ref 65–99)
HCT VFR BLD AUTO: 34.6 % (ref 42–52)
HGB BLD-MCNC: 11.3 G/DL (ref 14–18)
INR PPP: 1.39 (ref 0.87–1.13)
MCH RBC QN AUTO: 23.4 PG (ref 27–33)
MCHC RBC AUTO-ENTMCNC: 32.7 G/DL (ref 32.3–36.5)
MCV RBC AUTO: 71.8 FL (ref 81.4–97.8)
PLATELET # BLD AUTO: 277 K/UL (ref 164–446)
PMV BLD AUTO: 10.5 FL (ref 9–12.9)
POTASSIUM SERPL-SCNC: 3.3 MMOL/L (ref 3.6–5.5)
PROTHROMBIN TIME: 17.3 SEC (ref 12–14.6)
RBC # BLD AUTO: 4.82 M/UL (ref 4.7–6.1)
SODIUM SERPL-SCNC: 136 MMOL/L (ref 135–145)
WBC # BLD AUTO: 7.3 K/UL (ref 4.8–10.8)

## 2024-03-03 PROCEDURE — 700102 HCHG RX REV CODE 250 W/ 637 OVERRIDE(OP): Performed by: NURSE PRACTITIONER

## 2024-03-03 PROCEDURE — 85610 PROTHROMBIN TIME: CPT

## 2024-03-03 PROCEDURE — 700111 HCHG RX REV CODE 636 W/ 250 OVERRIDE (IP): Performed by: NURSE PRACTITIONER

## 2024-03-03 PROCEDURE — 94760 N-INVAS EAR/PLS OXIMETRY 1: CPT

## 2024-03-03 PROCEDURE — 700102 HCHG RX REV CODE 250 W/ 637 OVERRIDE(OP): Performed by: THORACIC SURGERY (CARDIOTHORACIC VASCULAR SURGERY)

## 2024-03-03 PROCEDURE — 85027 COMPLETE CBC AUTOMATED: CPT

## 2024-03-03 PROCEDURE — RXMED WILLOW AMBULATORY MEDICATION CHARGE: Performed by: NURSE PRACTITIONER

## 2024-03-03 PROCEDURE — 80048 BASIC METABOLIC PNL TOTAL CA: CPT

## 2024-03-03 PROCEDURE — 94669 MECHANICAL CHEST WALL OSCILL: CPT

## 2024-03-03 PROCEDURE — 700102 HCHG RX REV CODE 250 W/ 637 OVERRIDE(OP): Mod: JZ | Performed by: NURSE PRACTITIONER

## 2024-03-03 PROCEDURE — A9270 NON-COVERED ITEM OR SERVICE: HCPCS | Performed by: NURSE PRACTITIONER

## 2024-03-03 PROCEDURE — 306311 ANTI-EMBOLISM STOCKINGS XXLRG LNG: Performed by: THORACIC SURGERY (CARDIOTHORACIC VASCULAR SURGERY)

## 2024-03-03 PROCEDURE — A9270 NON-COVERED ITEM OR SERVICE: HCPCS | Performed by: THORACIC SURGERY (CARDIOTHORACIC VASCULAR SURGERY)

## 2024-03-03 PROCEDURE — A9270 NON-COVERED ITEM OR SERVICE: HCPCS | Mod: JZ | Performed by: NURSE PRACTITIONER

## 2024-03-03 RX ORDER — AMIODARONE HYDROCHLORIDE 200 MG/1
TABLET ORAL
Qty: 56 TABLET | Refills: 1 | Status: SHIPPED | OUTPATIENT
Start: 2024-03-03 | End: 2024-03-26

## 2024-03-03 RX ORDER — ASPIRIN 81 MG/1
81 TABLET ORAL DAILY
COMMUNITY
Start: 2024-03-04

## 2024-03-03 RX ORDER — WARFARIN SODIUM 5 MG/1
5 TABLET ORAL DAILY
Status: DISCONTINUED | OUTPATIENT
Start: 2024-03-04 | End: 2024-03-03 | Stop reason: HOSPADM

## 2024-03-03 RX ORDER — ACETAMINOPHEN 500 MG
TABLET ORAL
COMMUNITY
Start: 2024-03-03 | End: 2024-03-26

## 2024-03-03 RX ORDER — ENOXAPARIN SODIUM 100 MG/ML
100 INJECTION SUBCUTANEOUS EVERY 12 HOURS
Qty: 6 EACH | Refills: 2 | Status: ACTIVE | OUTPATIENT
Start: 2024-03-03 | End: 2024-03-05

## 2024-03-03 RX ORDER — OMEPRAZOLE 20 MG/1
20 CAPSULE, DELAYED RELEASE ORAL DAILY
Qty: 30 CAPSULE | Refills: 2 | Status: SHIPPED | OUTPATIENT
Start: 2024-03-04

## 2024-03-03 RX ORDER — WARFARIN SODIUM 5 MG/1
TABLET ORAL
Qty: 45 TABLET | Refills: 3 | Status: ACTIVE | OUTPATIENT
Start: 2024-03-03 | End: 2024-04-04

## 2024-03-03 RX ORDER — OXYCODONE HYDROCHLORIDE 5 MG/1
5 TABLET ORAL EVERY 8 HOURS PRN
Qty: 42 TABLET | Refills: 0 | Status: SHIPPED | OUTPATIENT
Start: 2024-03-03 | End: 2024-03-17

## 2024-03-03 RX ORDER — ENOXAPARIN SODIUM 150 MG/ML
1 INJECTION SUBCUTANEOUS EVERY 12 HOURS
Qty: 6 EACH | Refills: 1 | Status: ACTIVE | OUTPATIENT
Start: 2024-03-03 | End: 2024-03-03

## 2024-03-03 RX ORDER — WARFARIN SODIUM 5 MG/1
7.5 TABLET ORAL DAILY
Qty: 30 TABLET | Refills: 3 | Status: SHIPPED | OUTPATIENT
Start: 2024-03-03 | End: 2024-03-03

## 2024-03-03 RX ORDER — ENOXAPARIN SODIUM 100 MG/ML
1 INJECTION SUBCUTANEOUS ONCE
Qty: 1 EACH | Refills: 0 | Status: SHIPPED | OUTPATIENT
Start: 2024-03-03 | End: 2024-03-03

## 2024-03-03 RX ORDER — WARFARIN SODIUM 7.5 MG/1
7.5 TABLET ORAL
Status: DISCONTINUED | OUTPATIENT
Start: 2024-03-03 | End: 2024-03-03 | Stop reason: HOSPADM

## 2024-03-03 RX ORDER — ENOXAPARIN SODIUM 100 MG/ML
1 INJECTION SUBCUTANEOUS ONCE
Status: COMPLETED | OUTPATIENT
Start: 2024-03-03 | End: 2024-03-03

## 2024-03-03 RX ADMIN — GUAIFENESIN 600 MG: 600 TABLET, EXTENDED RELEASE ORAL at 05:51

## 2024-03-03 RX ADMIN — POTASSIUM CHLORIDE 40 MEQ: 1500 TABLET, EXTENDED RELEASE ORAL at 05:52

## 2024-03-03 RX ADMIN — POTASSIUM CHLORIDE 40 MEQ: 1500 TABLET, EXTENDED RELEASE ORAL at 12:00

## 2024-03-03 RX ADMIN — ACETAMINOPHEN 1000 MG: 325 TABLET, FILM COATED ORAL at 05:51

## 2024-03-03 RX ADMIN — ASPIRIN 81 MG: 81 TABLET, COATED ORAL at 05:49

## 2024-03-03 RX ADMIN — ENOXAPARIN SODIUM 100 MG: 100 INJECTION SUBCUTANEOUS at 11:59

## 2024-03-03 RX ADMIN — DOCUSATE SODIUM 100 MG: 100 CAPSULE, LIQUID FILLED ORAL at 05:50

## 2024-03-03 RX ADMIN — Medication 1 APPLICATOR: at 08:05

## 2024-03-03 RX ADMIN — DIVALPROEX SODIUM 500 MG: 500 TABLET, EXTENDED RELEASE ORAL at 08:05

## 2024-03-03 RX ADMIN — METOPROLOL TARTRATE 25 MG: 25 TABLET, FILM COATED ORAL at 05:49

## 2024-03-03 RX ADMIN — FUROSEMIDE 20 MG: 10 INJECTION INTRAMUSCULAR; INTRAVENOUS at 05:46

## 2024-03-03 RX ADMIN — ACETAMINOPHEN 1000 MG: 325 TABLET, FILM COATED ORAL at 12:00

## 2024-03-03 RX ADMIN — OXYCODONE HYDROCHLORIDE 10 MG: 10 TABLET ORAL at 05:58

## 2024-03-03 RX ADMIN — AMIODARONE HYDROCHLORIDE 400 MG: 200 TABLET ORAL at 05:51

## 2024-03-03 RX ADMIN — OMEPRAZOLE 20 MG: 20 CAPSULE, DELAYED RELEASE ORAL at 05:50

## 2024-03-03 ASSESSMENT — FIBROSIS 4 INDEX: FIB4 SCORE: 0.33

## 2024-03-03 ASSESSMENT — PAIN DESCRIPTION - PAIN TYPE: TYPE: SURGICAL PAIN

## 2024-03-03 NOTE — PROGRESS NOTES
Bedside report received. Patient A/Ox 4. VS -130's. No oxygen requirements currently. Telemetry monitoring SR. Complains of pain 6/10, patient medicated per MAR. POC discussed with patient. Pt verbalizes understanding. Call light and belongings within reach. Bed locked and lowest position and fall precautions in place.    Bedside report received from off going RN/tech: Gabriela RN assumed care of patient.     Fall Risk Score: LOW RISK  Fall risk interventions in place: Place yellow fall risk ID band on patient, Provide patient/family education based on risk assessment, Educate patient/family to call staff for assistance when getting out of bed, and Place fall precaution signage outside patient door  Bed type: Regular (Andrez Score less than 17 interventions in place)  Patient on cardiac monitor: Yes  IVF/IV medications: Not Applicable   Oxygen: Room Air  Bedside sitter: Not Applicable   Isolation: Not applicable

## 2024-03-03 NOTE — PROGRESS NOTES
Inpatient Anticoagulation Service Note for 3/3/2024      Reason for Anticoagulation: On-X Aortic Valve Replacement        Hemoglobin Value: (!) 11.3  Hematocrit Value: (!) 34.6  Lab Platelet Value: 277  Target INR: 2.0 to 3.0    INR from last 7 days       Date/Time INR Value    03/03/24 0150 1.39    03/02/24 0615 1.16    03/01/24 0550 1.2    02/29/24 0130 1.24    02/28/24 0520 1.26    02/27/24 1150 1.45    02/27/24 0400 1.01          Dose from last 7 days       Date/Time Dose (mg)    03/03/24 0956 7.5    03/02/24 0953 6    03/01/24 1610 4    02/29/24 1225 2.5    02/28/24 1609 2.5          Average Dose (mg):  (new start)  Significant Interactions: Amiodarone, Other (Comments) (Depakote)  Bridge Therapy: No     Assessment:  Warfarin initiated on 2/28/24 following On-X aortic mechanical valve replacement.   CBC/BMP stable this morning  Remains on cardiac diet  DDI: Depakote ER -  moderate interaction, Valproate products may enhance anticoag effects. Amiodarone initiated on 3/1/24 - Major interaction - amiodarone can increase INR significantly, requiring 30-50% warfarin dose reduction.   Dose initiated at 2.5 mg x2 days without any INR changes noted. Dose was subsequently increased on 3/1 to 4 mg x1, likely seeing effect from that dose today with increased INR from 1.16 > 1.39. Warfarin dose was increased last night to 6 mg x1, will likely see effect from increased dose tomorrow.      Plan:    Given today is day 5 of warfarin initiation and INR remains < 1.5, will give another bump in dose to 7.5 mg x1 tonight to get INR therapeutic in the next few days. However, given above listed interactions and trend noted this morning, anticipating patient will require a slight dose reduction thereafter.   Give 7.5 mg x1 tonight followed by 5 mg tomorrow     Education Material Provided?: Yes     Pharmacist suggested discharge dosing: Warfarin 7.5 mg x1 on 3/3/24, followed by warfarin 5 mg PO daily starting 3/4. Recommend outpatient  follow up within 48 hours of discharge to re-assess labs/dose.      Rea Hernandez, PharmD

## 2024-03-03 NOTE — CARE PLAN
The patient is Stable - Low risk of patient condition declining or worsening    Shift Goals  Clinical Goals: OHS care plan, ambulate, I's/O's, IS, shower, incision care  Patient Goals: D/C  Family Goals: NA    Progress made toward(s) clinical / shift goals:    Problem: Pain - Standard  Goal: Alleviation of pain or a reduction in pain to the patient’s comfort goal  Outcome: Met     Problem: Knowledge Deficit - Standard  Goal: Patient and family/care givers will demonstrate understanding of plan of care, disease process/condition, diagnostic tests and medications  Outcome: Met     Problem: Post Op Day 5 CABG/Heart Valve Replacement  Goal: Optimal care of the Post Op CABG/Heart Valve replacement Post Op Day 5  Outcome: Progressing  Intervention: Daily weights in the morning  Note: Daily weight competed in morning.   Intervention: Up in chair for all meals  Note: PT OOB for all meals.  Intervention: IS q 1 hour while awake and record best IS volume  Note: IS best score 1000. Pt educated on IS  Intervention: Discharge Education  Note: Discharge education complete.      Problem: Self Care  Goal: Patient will have the ability to perform ADLs independently or with assistance (bathe, groom, dress, toilet and feed)  Outcome: Progressing       Patient is not progressing towards the following goals:

## 2024-03-03 NOTE — CARE PLAN
The patient is Watcher - Medium risk of patient condition declining or worsening    Shift Goals  Clinical Goals: Increase mobility, encourage IS use, incision care and VSS  Patient Goals: Reduce pain and go home soon  Family Goals: Safe discharge    Progress made toward(s) clinical / shift goals:    Problem: Pain - Standard  Goal: Alleviation of pain or a reduction in pain to the patient’s comfort goal  Outcome: Progressing  Note: Using numeric and non-verbal descriptors to rate patient's pain. Administering medications as ordered and reassessing. Offering nonpharmacologic methods to relieve pain.     Problem: Post Op Day 4 CABG/Heart Valve Replacement  Goal: Optimal care of the Post Op CABG/Heart Valve replacement Post Op Day 4  Outcome: Progressing  Intervention: Daily weights in the morning  Note: Daily morning weight obtained.  Intervention: Shower daily and clean incisions twice daily with soap and water  Note: Incision care provided, increasing and encouraging patient participation.  Intervention: Up in chair for all meals  Note: Patient request to be in chair after ambulation in addition to being up to chair for meals.  Intervention: Ambulate 4 times daily, increasing the distance each time  Note: Patient ambulated twice during this shift. Encouraging patient to increase distance.  Intervention: IS q 1 hour while awake and record best IS volume  Note: Reminding patient to frequently use IS when awake. Patient best 1000.       Patient is not progressing towards the following goals:

## 2024-03-03 NOTE — DISCHARGE SUMMARY
DISCHARGE SUMMARY    ADMISSION DATE: 2/23/2024    DISCHARGE DATE: 3/3/2024    ADMITTING DIAGNOSES: Severe symptomatic aortic stenosis, bicuspid aortic valve, ascending aortic ectasia, seizure disorder, obesity, hypertension, dyslipidemia     DISCHARGE DIAGNOSES: Severe symptomatic aortic stenosis, bicuspid aortic valve, ascending aortic ectasia, seizure disorder, obesity, hypertension, dyslipidemia     PROCEDURES PERFORMED:   Dr. David 2/27/2024  ROOT ENLARGEMENT AORTA,  REPLACEMENT, AORTIC VALVE WITH A 23 MM ON-X VALVE-   ECHOCARDIOGRAM, TRANSESOPHAGEAL, INTRAOPERATIVE    HISTORY OF PRESENT ILLNESS:  The patient is a 33 y.o. male with past medical history of seizure disorder, hypertension, diabetes, heart murmur and bicuspid aortic valve who presented with chest pain. He states he has had it intermittently for the last two weeks. He reports it is moderate in intensity and no exertional, but aggravated by stress. He denies shortness of breath, orthopnea, PND, dizziness, and syncope. He works as a mailman. He had full work-up and was found to have severe aortic stenosis and was set-up for elective aortic valve replacement.      HOSPITAL COURSE:   POD 1 Extubated.  Off gtts.  Neuro intact.  Wounds CDI.  Abdomen soft nontender.  Cr 0.65 Hgb 11.9 Moderate output from mediastinal tubes.  Complaints of sore throat.  Plan: Cepacol lozenge PRN.  Keep mediastinal tubes and vera.  Start gentle diuresis.  Wean oxygen.  Encourage IS/ambulation.       POD 2 HDS. SR. Neuro intact.  Wounds CDI.  Mediastinal tubes removed yesterday.  Cr 0.68 Hgb 11.7.  Pacer wires removed.  Plan: Increase diuresis.  DC vera.  Transfer to tele.  Add mucinex. Encourage IS/ambulation.       POD 3 HDS. SR. Neuro intact.  Wounds CDI.  Abdomen soft -BM Cr 0.49 Hgb 10.4.  CXR negative.  Plan: Add metolazone.  Diurese.  Wean oxygen.  Encourage IS/ambulation.       POD 4 HDS, SR- on po amio, diuresed well- decrease, replace K+, Mechanical valve-  INR 1.17- on coumadin, 2 lnc- ck CXR, amb/enc IS, plan home 1-2 days when INR increased    POD 5 HDS, SR- on PO amio, diuresed well- below admit hardy- d/c lasix, Mechanical valve 1.39- starting lovenox until INR >2, ambulating, Room air, planning home today with HH and INR draw tomorrow.     TELEMETRY:  2/28 SR  2/29 SR  3/1 SR  3/2 SR  3/3 SR    RECENT LABS:     Lab Results   Component Value Date/Time    SODIUM 136 03/03/2024 01:50 AM    POTASSIUM 3.3 (L) 03/03/2024 01:50 AM    CHLORIDE 92 (L) 03/03/2024 01:50 AM    CO2 33 03/03/2024 01:50 AM    GLUCOSE 104 (H) 03/03/2024 01:50 AM    BUN 13 03/03/2024 01:50 AM    CREATININE 0.62 03/03/2024 01:50 AM    CREATININE 0.9 10/12/2006 08:58 AM      Lab Results   Component Value Date/Time    WBC 7.3 03/03/2024 01:50 AM    RBC 4.82 03/03/2024 01:50 AM    HEMOGLOBIN 11.3 (L) 03/03/2024 01:50 AM    HEMATOCRIT 34.6 (L) 03/03/2024 01:50 AM    MCV 71.8 (L) 03/03/2024 01:50 AM    MCH 23.4 (L) 03/03/2024 01:50 AM    MCHC 32.7 03/03/2024 01:50 AM    MPV 10.5 03/03/2024 01:50 AM    NEUTSPOLYS 55.00 02/23/2024 08:18 PM    LYMPHOCYTES 36.40 02/23/2024 08:18 PM    MONOCYTES 7.20 02/23/2024 08:18 PM    EOSINOPHILS 0.40 02/23/2024 08:18 PM    BASOPHILS 0.70 02/23/2024 08:18 PM    HYPOCHROMIA 1+ 05/10/2011 11:43 AM    ANISOCYTOSIS 1+ 10/12/2006 08:58 AM      Lab Results   Component Value Date/Time    PROTHROMBTM 17.3 (H) 03/03/2024 01:50 AM    INR 1.39 (H) 03/03/2024 01:50 AM        Fluids:    Intake/Output Summary (Last 24 hours) at 3/3/2024 1024  Last data filed at 3/3/2024 0629  Gross per 24 hour   Intake 120 ml   Output 1675 ml   Net -1555 ml     Admit weight: Weight: 104 kg (230 lb)  Current weight: Weight: 97.8 kg (215 lb 9.8 oz) (03/03/24 0600)    ALLERGIES:     Patient has no known allergies.    EJECTION FRACTION:  60%    CARDIAC MEDICATIONS:    ASA - Yes    Plavix - No; contraindicated because of On Coumadin / NOAC    Post-operative Beta Blockers - Yes    Ace/ARB- No;  contraindicated because of Normal EF    Statin - No; contraindicated because of No CAD    Aldactone- No; contraindicated because of Normal EF    SGLT2i-  No contraindicated because of No; contraindicated because of Normal EF    DISCHARGE MEDICATIONS:      Medication List        START taking these medications        Instructions   acetaminophen 500 MG Tabs  Start taking on: March 3, 2024  Commonly known as: Tylenol   Take 2 Tablets by mouth every 6 hours for 14 days, THEN 2 Tablets every 6 hours as needed for Mild Pain for up to 14 days.     amiodarone 200 MG Tabs  Commonly known as: Cordarone   Take 2 Tablets by mouth 2 times a day. x 7 days, Then Take Two Tablets daily for 7 days, Then Take 1 Tablet Daily.  Dose: 400 mg     aspirin 81 MG EC tablet  Start taking on: March 4, 2024   Take 1 Tablet by mouth every day.  Dose: 81 mg     enoxaparin 100 MG/ML Sosy inj  Commonly known as: Lovenox   Inject 100 mg under the skin every 12 hours.  Dose: 100 mg     metoprolol tartrate 25 MG Tabs  Commonly known as: Lopressor   Take 1 Tablet by mouth 2 times a day.  Dose: 25 mg     omeprazole 20 MG delayed-release capsule  Start taking on: March 4, 2024  Commonly known as: PriLOSEC   Take 1 Capsule by mouth every day.  Dose: 20 mg     oxyCODONE immediate-release 5 MG Tabs  Commonly known as: Roxicodone   Take 1 Tablet by mouth every 8 hours as needed for Severe Pain for up to 14 days.  Dose: 5 mg     * warfarin 5 MG Tabs  Commonly known as: Coumadin   Take 1.5 Tablets by mouth every day.  Dose: 7.5 mg     * warfarin 5 MG Tabs  Start taking on: March 3, 2024  Commonly known as: Coumadin   Take 1.5 Tablets by mouth every day for 2 days, THEN 1 Tablet every day at 6 PM for 30 days.           * This list has 2 medication(s) that are the same as other medications prescribed for you. Read the directions carefully, and ask your doctor or other care provider to review them with you.                CONTINUE taking these medications         Instructions   divalproex  MG Tb24  Commonly known as: Depakote ER   Take 500 mg by mouth 2 (two) times a day.  Dose: 500 mg            STOP taking these medications      amLODIPine 10 MG Tabs  Commonly known as: Norvasc     atenolol 50 MG Tabs  Commonly known as: Tenormin              NARCOTIC PAIN MEDICATIONS:   In prescribing controlled substances to this patient, I certify that I have obtained and reviewed their medical history. I have also made a good roe effort to obtain applicable records from other providers who have treated the patient .    I have conducted a physical exam and documented it. I have reviewed the patient's prescription history as maintained by the [place] Prescription Monitoring Program.     I have assessed the patient’s risk for abuse, dependency, and addiction using the validated Opioid Risk Tool.    Given the above, I believe the benefits of controlled substance therapy outweigh the risks. The reasons for prescribing controlled substances include non-narcotic, oral analgesic alternatives have been inadequate for pain control. Accordingly, I have discussed the risk and benefits, treatment plan, and alternative therapies with the patient.     Pt understands this prescription is a controlled substance which is potentially habit-forming and its use is regulated by the [employer]. It must be submitted to the pharmacy within 5 days of the date written and can not be called in or faxed to the pharmacy. Refills are subject to terms of a medicine agreement. Any refill requires a new prescription that must be obtained from this office during regular office hours Monday through Thursday 7 am to 4 pm. We ask for 72 hours notice to get an appointment for a narcotic pain medication refill. This medicine can cause nausea, significant constipation, sedation, confusion.     DIET:   Cardiac diet    DISCHARGE INSTRUCTIONS DISCUSSED WITH THE PATIENT:      1. NO driving for 4 weeks after surgery. You may ride  as a passenger.  2. NO lifting of any item over 10 lbs (e.g. gallon of milk) for 6 weeks after surgery.  3. DO walk as much as possible! Walk a minimum of once a day. Depending on your fatigue and comfort level, you may walk as much as you wish. There is no maximum.  4. Other physical activities (sex, housework, gardening, etc.) are OK after 4 weeks   5. Continue using incentive spirometer for 2 weeks, especially if going home on oxygen.    Incision Care:  1. SHOWER ONLY - no baths. Clean incision daily with plain Ivory ® soap or any other dye or perfume free soap. Then pat incision dry with clean towel. Avoid creams or lotions on the incision(s).  a. If there is any increase in redness or swelling, or separation of the incision line, or thick drainage* from any of the incisions, call right away  * Clear, thin drainage is not abnormal especially from the leg incision and/or                         chest tube sites.  2. Continue to wear your PRABHU Stockings for 4 weeks. You may take off the stockings when in bed or when the legs are elevated.    Patient instructed to call [facility] cardiac surgery at [phone]  if any increased shortness of breath, uncontrolled pain, weight gain greater than 3 pounds in 1 day or 5 pounds in 1 week, SBP >140, HR <60 or redness swelling or drainage of incisions.      FOLLOW-UP:   Future Appointments   Date Time Provider Department Center   3/26/2024 10:45 AM CHAKA Umanzor [facility] None   4/1/2024 12:15 PM CT RESOURCE PROVIDER CT None

## 2024-03-03 NOTE — DISCHARGE INSTRUCTIONS
DIVISION OF CARDIAC SURGERY   DISCHARGE INSTRUCTIONS    Activity:    NO driving for 4 weeks after surgery. You may ride as a passenger.  NO lifting, pushing, or pulling more than 10 pounds for 6 weeks.  For the next 6 weeks, keep your elbows close to your body and move within a pain-free motion when lifting, pushing or pulling.  Do not stretch both arms backwards at the same time.    Walk at least 4 times per day, there is no maximum. The goal is to increase your distance over time.  Continue using incentive spirometer for 2 weeks or until your baseline volume is reached.  If you are going home on oxygen and you were not on oxygen prior to surgery, keep using until you are oxygen free.  Weigh yourself daily.  Call your Cardiologist for a weight gain of 3 or more pounds in 1 day or more than 5 pounds in 7 days.  Take all of your medications as prescribed. Do not use a pill box for the first month at home. If you have questions, please call your nurse navigator at [phone].  Continue to wear the PRABHU (compression) stockings for 2-4 weeks or until all swelling is gone. You may take them off when you are in bed or when your legs are elevated.    Incision Care:    Make sure to clean your incision(s) TWICE DAILY.  Once by showering AND once using the no rinse Foam cleanser provided in the hospital.  During the shower, cleanse the incision(s) with a perfume and dye free soap (Dial, Dove, [place])  Use gentle pressure and rub up and down over incision with your hands or a washcloth. Rinse off and pat incision(s) dry with clean towel.  Keep the incision open to air. No creams or lotions on your incision(s). No baths.  If there is any increased redness or swelling, separation of the incision line, or thick drainage from any of your incisions, call the Cardiac Surgeons ([phone]).      General Instructions:    You have been referred to Cardiac Rehab.  You can start Cardiac Rehab 30 days after surgery.  If you do  not have an appointment at the time of discharge call [phone] to schedule an appointment.  Your Primary Care Doctor typically handles home oxygen. Oxygen may be stopped when your oxygen level is consistently greater than 90.  Check with your Primary Care Doctor if you are unsure.  Take all of your medications (including pain medications) as prescribed.  Taking medications other than prescribed can result in serious injury.    For Patients Discharged with Narcotic Pain Medication:     If a refill is needed, understand that only 1 refill will be provided and you must come to the Cardiac Surgeons’ office for an appointment (72 hours’ notice is required to schedule and there are no weekend appointments).  If the pain medications you are discharged on are not working, you will need to bring your remaining prescription into the office in order to receive a new prescription.  If you were taking narcotics prior to your heart surgery, the Cardiac Surgeons will provide you with one prescription and additional medications will need to be provided by your pain management doctor.  Do not drink alcohol while taking narcotics.  Lost or stolen medications will not be refilled.  If medications are stolen, report to law enforcement.    Contact Cardiac Surgery at [phone] if you have any questions.

## 2024-03-03 NOTE — PROGRESS NOTES
Assumed care of patient. Received report from previous shift RN. Pt is A+O4 and currently sitting up in the recliner. POC discussed, pt has no questions at this time. Hourly rounding in place.

## 2024-03-04 ENCOUNTER — TELEPHONE (OUTPATIENT)
Dept: MEDICAL GROUP | Facility: MEDICAL CENTER | Age: 34
End: 2024-03-04
Payer: COMMERCIAL

## 2024-03-04 NOTE — TELEPHONE ENCOUNTER
[facility]     Received anticoagulation referral from Dr David on 2/29/24.     Called patient to schedule an appt to establish care. No answer. LVM.  MyChart message sent.    1st call     Insurance: Workers comp  PCP: External  Locations to be seen: [place]     If no response by 4/1/24 OR 2 no shows/cancellations, will remove from referral list     [facility] Antico[facility]/Pharmacotherapy [facility] at [phone], fax [phone]    Ariella AriasD

## 2024-03-05 ENCOUNTER — TELEPHONE (OUTPATIENT)
Dept: CARDIOLOGY | Facility: MEDICAL CENTER | Age: 34
End: 2024-03-05
Payer: COMMERCIAL

## 2024-03-05 ENCOUNTER — DOCUMENTATION (OUTPATIENT)
Dept: VASCULAR LAB | Facility: MEDICAL CENTER | Age: 34
End: 2024-03-05
Payer: COMMERCIAL

## 2024-03-05 ENCOUNTER — ANTICOAGULATION MONITORING (OUTPATIENT)
Dept: VASCULAR LAB | Facility: MEDICAL CENTER | Age: 34
End: 2024-03-05
Payer: COMMERCIAL

## 2024-03-05 ENCOUNTER — TELEPHONE (OUTPATIENT)
Dept: VASCULAR LAB | Facility: MEDICAL CENTER | Age: 34
End: 2024-03-05
Payer: COMMERCIAL

## 2024-03-05 DIAGNOSIS — Z95.2 S/P AVR (AORTIC VALVE REPLACEMENT): ICD-10-CM

## 2024-03-05 DIAGNOSIS — Z86.718 HISTORY OF DVT IN ADULTHOOD: ICD-10-CM

## 2024-03-05 LAB — INR PPP: 2.2 (ref 2–3.5)

## 2024-03-05 NOTE — TELEPHONE ENCOUNTER
[facility] and Pharmacotherapy [facility]     Received anticoagulation referral from Dr David on 2/29/24.     Per discharge planning note, pt was accepted on service by [facility].   Called [facility] Clinical Manager - left VM that I will send INR order to them to test at the initial visit.     Insurance: Workers comp  PCP: External  Locations to be seen: [facility]     If no response by 4/1/24 OR 2 no shows/cancellations, will remove from referral list     [facility] Anticoagulation/Pharmacotherapy Clinic at [phone], fax [phone]     Emma Bonds, PharmD

## 2024-03-05 NOTE — TELEPHONE ENCOUNTER
INR Reporting    PT Name: Umesh Chang    PT Reports INR Value: 2.2    Concerns/Questions Reported: or N/A: Patient would like to know if he needs to take his lovenox shots with his warfarin. Patient would like a call back to discuss.    Callback Number: [phone]    Thank you,  Lizeth BENJAMIN

## 2024-03-06 ENCOUNTER — ANTICOAGULATION MONITORING (OUTPATIENT)
Dept: VASCULAR LAB | Facility: MEDICAL CENTER | Age: 34
End: 2024-03-06
Payer: COMMERCIAL

## 2024-03-06 DIAGNOSIS — Z95.2 S/P AVR (AORTIC VALVE REPLACEMENT): ICD-10-CM

## 2024-03-06 NOTE — PROGRESS NOTES
Initial anticoagulation clinic note and discharge summary reviewed    Patient started on anticoagulation status post ox-x mechanical aVR    Pending further recommendations from cardiology, we will continue with indefinite oral anti-coagulation with warfarin and indefinite low-dose aspirin.    Will defer all other cardiovascular care, aside from anticoagulation, to cardiology    Michael J. Bloch, MD  Anticoagulation Center

## 2024-03-06 NOTE — TELEPHONE ENCOUNTER
Caller: Umesh Chang     Topic/issue: Patient is concerned about his medication directions for the evening, please advise     Callback Number: [phone]    Thank You   Dorina MALONE

## 2024-03-06 NOTE — PROGRESS NOTES
Anticoagulation Summary  As of 3/5/2024      INR goal:  2.0-3.0   TTR:  --   INR used for dosin.20 (3/5/2024)   Warfarin maintenance plan:  5 mg (5 mg x 1) every day   Weekly warfarin total:  35 mg   Plan last modified:  Emma Bonds, PharmD (3/5/2024)   Next INR check:  3/7/2024   Target end date:  Indefinite    Indications    S/P AVR (aortic valve replacement) [Z95.2]                 Anticoagulation Episode Summary       INR check location:      Preferred lab:      Send INR reminders to:      Comments:  On-X Aortic Valve Replacement - lower goal to 1.5 to 2.0 around 24  [facility]  [phone]          Anticoagulation Care Providers       Provider Role Specialty Phone number    [facility]   [phone]            Refer to Anticoagulation Patient Findings for HPI      PCP Mohsen Tamasaby, M.D.  Cardiologist Wilmer Giraldo APPEACE    Pt hx - pt is s/p On-X AVR 24, therefore started on warfarin + aspirin    CHADSVASC = n/a  HAS-BLED = 1 (on ASA)  DOAC = not indicated  Target end date = indefinite    Pt is new to warfarin and new to [facility]. Discussed:   Indication for warfarin therapy and INR goal range.   Importance of monitoring and compliance.   Monitoring parameters, signs and symptoms of bleeding or clotting.  Warfarin therapy, side effects, potential DDIs, OTC medications  Hormonal therapy none  Pregnancy n/a  Antiplatelet aspirin per cards  DDI - amiodarone and Depakote  Importance of diet consistency, ie vitamin K intake, supplements  Lifestyle safety, ie smoking, ETOH, hobby safety, fall safety/prevention  Procedures for missed doses or suspected missed doses, surgeries/procedures, travel, dental work, any medication changes    INR is therapeutic today.   Will continue to titrate pt's warfarin dose. Pt has been taking 5mg daily   Warfarin Plan: Continue regimen as listed above. STOP Lovenox    Recheck INR in 2 days via [facility].    Emma Bonds, PharmD    Added [facility] Services to care team   Send to Bloch

## 2024-03-07 ENCOUNTER — ANTICOAGULATION MONITORING (OUTPATIENT)
Dept: CARDIOLOGY | Facility: MEDICAL CENTER | Age: 34
End: 2024-03-07
Payer: COMMERCIAL

## 2024-03-07 ENCOUNTER — TELEPHONE (OUTPATIENT)
Dept: CARDIOTHORACIC SURGERY | Facility: MEDICAL CENTER | Age: 34
End: 2024-03-07
Payer: COMMERCIAL

## 2024-03-07 ENCOUNTER — TELEPHONE (OUTPATIENT)
Dept: SCHEDULING | Facility: IMAGING CENTER | Age: 34
End: 2024-03-07
Payer: COMMERCIAL

## 2024-03-07 DIAGNOSIS — Z95.2 S/P AVR (AORTIC VALVE REPLACEMENT): ICD-10-CM

## 2024-03-07 LAB — INR PPP: 1.8 (ref 2–3.5)

## 2024-03-07 NOTE — ED TRIAGE NOTES
Pt brought in by ems in four point restraints. Pt was found in vehicle on side of road. Concerned for seizure, wife on phone sated pt had epilepsy. Pt was gcs 3 on fire, then confused and combatitive for ems. Since arrival, pt is extremely uncooperative, spitting, and trying to roll in bed. Pt actively fighting restraints and multiple staff members needed to assist. Security assisted with restraints and ERP nelson at bedside. EMS gave 5mg IM versed. Pt is profusely diaphoretics and unable to get vital signs at this time.    1.63

## 2024-03-07 NOTE — TELEPHONE ENCOUNTER
Hospital follow up call    he is showering everyday or every other day.    he states that all incisions are healing well and approximated with no s/s of infection (redness, puss, fever, purulent drainage, or tenderness).    he states that the post op pain is well controlled. Narcotics are being utilized. Tylenol is not being utilized.    he is using the IS; volume is slightly improved.    he is walking everyday, distance is increased from the hospital.    he is obtaining daily weights. Current weight is 215 lbs which is more than the first home weight of 213 lbs. He is has been wearing the compression/PRABHU hose. he denies LE edema.    He is taking all prescribed meds as directed.  he had no medication questions.    he is taking vitals (HR and BP).    BP's: 136/92  118/93  121/72  120/76 143/78 (once last night)  HR's: high 70's to low 80's    He was told to call if SBP is > 140 for > 2 days.    he has had a bowel movement since discharge.    he has no additional questions at this time. Follow up education was not needed on anything. Follow up appointments were reviewed and I ensured they have my number if issues or concerns arise.      Follow up call time was 13 minutes.

## 2024-03-07 NOTE — TELEPHONE ENCOUNTER
Caller: Heri RN with [facility]    Topic/issue: Reporting patient's INR.    INR: 1.8  Date: 03/07/24    Callback Number: [phone] ([place])     Thank you,  Catherine MCLEOD

## 2024-03-07 NOTE — PROGRESS NOTES
Anticoagulation Summary  As of 3/7/2024      INR goal:  2.0-3.0   TTR:  --   INR used for dosin.80 (3/7/2024)   Warfarin maintenance plan:  5 mg (5 mg x 1) every day   Weekly warfarin total:  35 mg   Plan last modified:  Emma Bonds PharmD (3/5/2024)   Next INR check:  3/11/2024   Target end date:  Indefinite    Indications    S/P AVR (aortic valve replacement) [Z95.2]                 Anticoagulation Episode Summary       INR check location:      Preferred lab:      Send INR reminders to:      Comments:  On-X Aortic Valve Replacement - lower goal to 1.5 to 2.0 around 24  [facility]  [phone]          Anticoagulation Care Providers       Provider Role Specialty Phone number    [facility]   [phone]          Called and left VM for patient. Requested patient to contact the clinic for any s/sx of unusual bleeding, bruising, clotting or any changes to diet or medications.    INR slightly subtherapeutic. Anticipate warfarin requirements to increase as health status improves s/p On-X AVR and as he tapers his amiodarone dose.    Unless patient reports any changes that would warrant an adjustment to the plan:  Warfarin Plan:  7.5 mg Thu/Sat, 5 mg Fri/Sun    Next INR in 4 day(s) via [facility] Hernandes PharmD, BCACP          ADDENDUM 3/7/2024 3:49 PM:    Patient returned call. He reports improving appetite. No changes to plan. Advised of warfarin dosing as noted above. Patient verbalized understanding of instructions.    Kishore Hernandes PharmD, BCACP

## 2024-03-11 ENCOUNTER — TELEPHONE (OUTPATIENT)
Dept: VASCULAR LAB | Facility: MEDICAL CENTER | Age: 34
End: 2024-03-11
Payer: COMMERCIAL

## 2024-03-11 ENCOUNTER — ANTICOAGULATION MONITORING (OUTPATIENT)
Dept: VASCULAR LAB | Facility: MEDICAL CENTER | Age: 34
End: 2024-03-11
Payer: COMMERCIAL

## 2024-03-11 DIAGNOSIS — Z95.2 S/P AVR (AORTIC VALVE REPLACEMENT): ICD-10-CM

## 2024-03-11 LAB — INR PPP: 1.8 (ref 2–3.5)

## 2024-03-11 NOTE — PROGRESS NOTES
Anticoagulation Summary  As of 3/11/2024      INR goal:  2.0-3.0   TTR:  --   INR used for dosin.80 (3/11/2024)   Warfarin maintenance plan:  5 mg (5 mg x 1) every day   Weekly warfarin total:  35 mg   Plan last modified:  Emma Bonds, AriellaD (3/5/2024)   Next INR check:  3/13/2024   Target end date:  Indefinite    Indications    S/P AVR (aortic valve replacement) [Z95.2]                 Anticoagulation Episode Summary       INR check location:      Preferred lab:      Send INR reminders to:      Comments:  On-X Aortic Valve Replacement - lower goal to 1.5 to 2.0 around 24  [facility]  [phone]          Anticoagulation Care Providers       Provider Role Specialty Phone number    [facility]   [phone]            Refer to Anticoagulation Patient Findings for HPI  Patient Findings       Negatives:  Signs/symptoms of thrombosis, Signs/symptoms of bleeding, Laboratory test error suspected, Change in health, Change in alcohol use, Change in activity, Upcoming invasive procedure, Emergency department visit, Upcoming dental procedure, Missed doses, Extra doses, Change in medications, Change in diet/appetite, Hospital admission, Bruising, Other complaints            Spoke with patient.  INR is SUB-therapeutic.    Pt verifies warfarin weekly dosing.       Will have pt resume lovenox (100 mg) injections BID and increase warfarin to 10 mg today and 7.5 mg tomorrow.     Repeat INR in 2 day(s).     Kristi Marks, AriellaD

## 2024-03-11 NOTE — TELEPHONE ENCOUNTER
INR Reporting     [facility]      PT Reports INR Value: 1.8     Concerns/Questions Reported: or N/A: N/A     Callback Number: Pt's phone [phone]     Thank you,    Colby RAMIREZ

## 2024-03-13 ENCOUNTER — ANTICOAGULATION MONITORING (OUTPATIENT)
Dept: VASCULAR LAB | Facility: MEDICAL CENTER | Age: 34
End: 2024-03-13
Payer: COMMERCIAL

## 2024-03-13 ENCOUNTER — TELEPHONE (OUTPATIENT)
Dept: VASCULAR LAB | Facility: MEDICAL CENTER | Age: 34
End: 2024-03-13
Payer: COMMERCIAL

## 2024-03-13 DIAGNOSIS — Z95.2 S/P AVR (AORTIC VALVE REPLACEMENT): ICD-10-CM

## 2024-03-13 LAB — INR PPP: 1.9 (ref 2–3.5)

## 2024-03-13 NOTE — PROGRESS NOTES
Anticoagulation Summary  As of 3/13/2024      INR goal:  2.0-3.0   TTR:  --   INR used for dosin.90 (3/13/2024)   Warfarin maintenance plan:  5 mg (5 mg x 1) every day   Weekly warfarin total:  35 mg   Plan last modified:  Emma Bonds, PharmD (3/5/2024)   Next INR check:  3/15/2024   Target end date:  Indefinite    Indications    S/P AVR (aortic valve replacement) [Z95.2]                 Anticoagulation Episode Summary       INR check location:      Preferred lab:      Send INR reminders to:      Comments:  On-X Aortic Valve Replacement - lower goal to 1.5 to 2.0 around 24  [facility]  [phone]          Anticoagulation Care Providers       Provider Role Specialty Phone number    [facility]   [phone]          Anticoagulation Patient Findings  Patient Findings       Negatives:  Signs/symptoms of thrombosis, Signs/symptoms of bleeding, Laboratory test error suspected, Change in health, Change in alcohol use, Change in activity, Upcoming invasive procedure, Emergency department visit, Upcoming dental procedure, Missed doses, Extra doses, Change in medications, Change in diet/appetite, Hospital admission, Bruising, Other complaints            INR is slightly subtherapeutic    Called and spoke to patient.    Pt is on antiplatelet therapy with ASA for AVR.    Warfarin Plan:  Bolus with 10mg x2 days - pt is out of Lovenox, since INR is 0.1 out of range, will forgo bridging for now.    Next INR in 2 day(s) via [facility].    Emma Bonds, PharmD

## 2024-03-13 NOTE — TELEPHONE ENCOUNTER
INR Reading    Called in by: Christine - Home Health Nurse    3/13/24  INR; 1.9    [facility] - [facility]  - [phone]    Thank you,   Nancy HAND

## 2024-03-13 NOTE — PROGRESS NOTES
CHIEF COMPLAINT: Post-op visit     PROCEDURE: Dr. David 2/27/2024  ROOT ENLARGEMENT AORTA,  REPLACEMENT, AORTIC VALVE WITH A 23 MM ON-X VALVE-   ECHOCARDIOGRAM, TRANSESOPHAGEAL, INTRAOPERATIVE    HPI: ***    There were no vitals taken for this visit.    PHYSICAL EXAM:  Physical Exam   Cardiac: S1S2  Neuro:  AAO x 3  Resp:  CTA  Wounds:  CDI  Sternum:  Stable    PLAN: ***  Continue coumadin for 3 months or per your Cardiologist's recommendations.  We reviewed post operative sternal precautions, weight limits and driving precautions moving forward.  We reviewed SBE prophylaxis.      Overall, we are very pleased with the patient’s progress and we have instructed the patient to follow-up with us in the future should they have any concerns related to their surgery. Otherwise, we will see the patient on a PRN basis. The patient will continue to follow-up with their Cardiologist and PCP.  The patient has been informed that any further prescription refills should be done through their primary care physician and/or cardiologist.  They acknowledged understanding.  Thank you for allowing us to participate in the care of this very pleasant patient and please let us know if there is any way we may be of further assistance.

## 2024-03-15 ENCOUNTER — ANTICOAGULATION MONITORING (OUTPATIENT)
Dept: VASCULAR LAB | Facility: MEDICAL CENTER | Age: 34
End: 2024-03-15
Payer: COMMERCIAL

## 2024-03-15 ENCOUNTER — TELEPHONE (OUTPATIENT)
Dept: VASCULAR LAB | Facility: MEDICAL CENTER | Age: 34
End: 2024-03-15
Payer: COMMERCIAL

## 2024-03-15 DIAGNOSIS — Z95.2 S/P AVR (AORTIC VALVE REPLACEMENT): ICD-10-CM

## 2024-03-15 LAB — INR PPP: 2.5 (ref 2–3.5)

## 2024-03-15 NOTE — TELEPHONE ENCOUNTER
INR Reading    Called in by [facility]    3/15/24  INR :  2.5    [facility]  - [phone]    Thank you,   Nancy HAND

## 2024-03-15 NOTE — PROGRESS NOTES
Anticoagulation Summary  As of 3/15/2024      INR goal:  2.0-3.0   TTR:  --   INR used for dosin.50 (3/15/2024)   Warfarin maintenance plan:  5 mg (5 mg x 1) every day   Weekly warfarin total:  35 mg   Plan last modified:  Emma Bonds, Haseeb (3/5/2024)   Next INR check:  3/18/2024   Target end date:  Indefinite    Indications    S/P AVR (aortic valve replacement) [Z95.2]                 Anticoagulation Episode Summary       INR check location:      Preferred lab:      Send INR reminders to:      Comments:  On-X Aortic Valve Replacement - lower goal to 1.5 to 2.0 around 24  [facility]  [phone]          Anticoagulation Care Providers       Provider Role Specialty Phone number    [facility]   [phone]            Refer to Anticoagulation Patient Findings for HPI  Patient Findings       Negatives:  Signs/symptoms of thrombosis, Signs/symptoms of bleeding, Laboratory test error suspected, Change in health, Change in alcohol use, Change in activity, Upcoming invasive procedure, Emergency department visit, Upcoming dental procedure, Missed doses, Extra doses, Change in medications, Change in diet/appetite, Hospital admission, Bruising, Other complaints            Spoke with patient to report a therapeutic INR.        Pt instructed to decrease warfarin to 7.5 mg daily until next INR.      Will follow up in 3 day(s).     Ariella AriasD

## 2024-03-18 ENCOUNTER — TELEPHONE (OUTPATIENT)
Dept: VASCULAR LAB | Facility: MEDICAL CENTER | Age: 34
End: 2024-03-18
Payer: COMMERCIAL

## 2024-03-18 ENCOUNTER — TELEPHONE (OUTPATIENT)
Dept: SCHEDULING | Facility: IMAGING CENTER | Age: 34
End: 2024-03-18
Payer: COMMERCIAL

## 2024-03-18 ENCOUNTER — ANTICOAGULATION MONITORING (OUTPATIENT)
Dept: VASCULAR LAB | Facility: MEDICAL CENTER | Age: 34
End: 2024-03-18
Payer: COMMERCIAL

## 2024-03-18 DIAGNOSIS — Z95.2 S/P AVR (AORTIC VALVE REPLACEMENT): ICD-10-CM

## 2024-03-18 LAB — INR PPP: 2.4 (ref 2–3.5)

## 2024-03-18 NOTE — TELEPHONE ENCOUNTER
Caller: Umesh Chang    Topic/issue: Patient is calling asking if there is a way to have an in home monitor ordered for his INR checks. His insurance ([employer]) told him he would need a code provided by the clinic to have one ordered. Is this something we can provide? Please advise.     Callback Number: [phone]     Thank you,   Jelly OJEDA    I called the pt and responded to the inquiry.  Once pt has been on warfarin for 90 days we can apply for a home monitor.     Jensen Kaur, PharmD

## 2024-03-18 NOTE — PROGRESS NOTES
Anticoagulation Summary  As of 3/18/2024      INR goal:  2.0-3.0   TTR:  100.0% (3 d)   INR used for dosin.40 (3/18/2024)   Warfarin maintenance plan:  7.5 mg (5 mg x 1.5) every day   Weekly warfarin total:  52.5 mg   Plan last modified:  Kristi Marks PharmD (3/18/2024)   Next INR check:  3/21/2024   Target end date:  Indefinite    Indications    S/P AVR (aortic valve replacement) [Z95.2]                 Anticoagulation Episode Summary       INR check location:      Preferred lab:      Send INR reminders to:      Comments:  On-X Aortic Valve Replacement - lower goal to 1.5 to 2.0 around 24  [facility]  [phone]          Anticoagulation Care Providers       Provider Role Specialty Phone number    [facility]   [phone]            Refer to Anticoagulation Patient Findings for HPI  Patient Findings       Negatives:  Signs/symptoms of thrombosis, Signs/symptoms of bleeding, Laboratory test error suspected, Change in health, Change in alcohol use, Change in activity, Upcoming invasive procedure, Emergency department visit, Upcoming dental procedure, Missed doses, Extra doses, Change in medications, Change in diet/appetite, Hospital admission, Bruising, Other complaints            Spoke with patient to report a therapeutic INR.      Pt is NOT on antiplatelet therapy    Pt instructed to continue with current warfarin dosing regimen, confirms dosing.   Will follow up in 4 day(s) via home health.     Kristi Marks PharmD

## 2024-03-18 NOTE — TELEPHONE ENCOUNTER
PT Name: U[facility] Chang     PT Reports INR Value: 2.4    Date of INR Results: 03/18/24    Callback Number: [phone]    Thank You   Dorina MALONE

## 2024-03-21 ENCOUNTER — ANTICOAGULATION MONITORING (OUTPATIENT)
Dept: VASCULAR LAB | Facility: MEDICAL CENTER | Age: 34
End: 2024-03-21
Payer: COMMERCIAL

## 2024-03-21 DIAGNOSIS — Z95.2 S/P AVR (AORTIC VALVE REPLACEMENT): ICD-10-CM

## 2024-03-21 LAB — INR PPP: 2.5 (ref 2–3.5)

## 2024-03-21 NOTE — PROGRESS NOTES
Anticoagulation Summary  As of 3/21/2024      INR goal:  2.0-3.0   TTR:  100.0% (6 d)   INR used for dosin.50 (3/21/2024)   Warfarin maintenance plan:  7.5 mg (5 mg x 1.5) every day   Weekly warfarin total:  52.5 mg   Plan last modified:  Kristi Marks PharmD (3/18/2024)   Next INR check:  3/26/2024   Target end date:  Indefinite    Indications    S/P AVR (aortic valve replacement) [Z95.2]                 Anticoagulation Episode Summary       INR check location:      Preferred lab:      Send INR reminders to:      Comments:  On-X Aortic Valve Replacement - lower goal to 1.5 to 2.0 around 24  [facility] [phone]          Anticoagulation Care Providers       Provider Role Specialty Phone number    [facility]   [phone]          Anticoagulation Patient Findings          HPI:  Umesh Frazieriz, on anticoagulation therapy with warfarin for valve replacement.  Changes to current medical/health status since last appt: none  Denies signs/symptoms of bleeding and/or thrombosis since the last appt.    Denies any interval changes to diet  Denies any interval changes to medications since last appt.   Denies any complications or cost restrictions with current therapy.     A/P   INR  therapeutic.   Pt is to continue with current warfarin dosing regimen.     Next INR in 5 days.     Jensen Kaur, PharmD    [facility]

## 2024-03-26 ENCOUNTER — ANTICOAGULATION MONITORING (OUTPATIENT)
Dept: VASCULAR LAB | Facility: MEDICAL CENTER | Age: 34
End: 2024-03-26

## 2024-03-26 ENCOUNTER — OFFICE VISIT (OUTPATIENT)
Dept: CARDIOLOGY | Facility: MEDICAL CENTER | Age: 34
End: 2024-03-26
Attending: NURSE PRACTITIONER
Payer: COMMERCIAL

## 2024-03-26 VITALS
BODY MASS INDEX: 30.94 KG/M2 | DIASTOLIC BLOOD PRESSURE: 66 MMHG | OXYGEN SATURATION: 98 % | WEIGHT: 221 LBS | RESPIRATION RATE: 16 BRPM | SYSTOLIC BLOOD PRESSURE: 120 MMHG | HEART RATE: 69 BPM | HEIGHT: 71 IN

## 2024-03-26 DIAGNOSIS — Z79.01 WARFARIN ANTICOAGULATION: ICD-10-CM

## 2024-03-26 DIAGNOSIS — Z95.2 S/P AVR (AORTIC VALVE REPLACEMENT): ICD-10-CM

## 2024-03-26 DIAGNOSIS — I10 PRIMARY HYPERTENSION: ICD-10-CM

## 2024-03-26 DIAGNOSIS — Z51.81 ENCOUNTER FOR MONITORING AMIODARONE THERAPY: ICD-10-CM

## 2024-03-26 DIAGNOSIS — Z79.899 ENCOUNTER FOR MONITORING AMIODARONE THERAPY: ICD-10-CM

## 2024-03-26 LAB
EKG IMPRESSION: NORMAL
INR PPP: 2.4 (ref 2–3.5)

## 2024-03-26 PROCEDURE — 3074F SYST BP LT 130 MM HG: CPT | Performed by: NURSE PRACTITIONER

## 2024-03-26 PROCEDURE — 99212 OFFICE O/P EST SF 10 MIN: CPT | Performed by: NURSE PRACTITIONER

## 2024-03-26 PROCEDURE — 99211 OFF/OP EST MAY X REQ PHY/QHP: CPT | Performed by: NURSE PRACTITIONER

## 2024-03-26 PROCEDURE — 93005 ELECTROCARDIOGRAM TRACING: CPT | Performed by: NURSE PRACTITIONER

## 2024-03-26 PROCEDURE — 99203 OFFICE O/P NEW LOW 30 MIN: CPT | Performed by: NURSE PRACTITIONER

## 2024-03-26 PROCEDURE — 93010 ELECTROCARDIOGRAM REPORT: CPT | Performed by: INTERNAL MEDICINE

## 2024-03-26 PROCEDURE — 3078F DIAST BP <80 MM HG: CPT | Performed by: NURSE PRACTITIONER

## 2024-03-26 ASSESSMENT — ENCOUNTER SYMPTOMS
ORTHOPNEA: 0
NERVOUS/ANXIOUS: 0
CLAUDICATION: 0
BRUISES/BLEEDS EASILY: 0
PND: 0
NEUROLOGICAL NEGATIVE: 1
CONSTITUTIONAL NEGATIVE: 1
WHEEZING: 0
SHORTNESS OF BREATH: 0
EYES NEGATIVE: 1
GASTROINTESTINAL NEGATIVE: 1
SENSORY CHANGE: 0
HALLUCINATIONS: 0
DEPRESSION: 0
RESPIRATORY NEGATIVE: 1
MUSCULOSKELETAL NEGATIVE: 1
PALPITATIONS: 0
DIZZINESS: 0
NAUSEA: 0

## 2024-03-26 ASSESSMENT — FIBROSIS 4 INDEX: FIB4 SCORE: 0.33

## 2024-03-26 NOTE — ASSESSMENT & PLAN NOTE
- long term follow up with INR clinic   - importance of therapeutic levels of INR explained in detail

## 2024-03-26 NOTE — ASSESSMENT & PLAN NOTE
- finish loading dose and then stop   - will reassess long term need for antiarrhythmic post washout   -

## 2024-03-26 NOTE — ASSESSMENT & PLAN NOTE
- continue aspirin and metoprolol at this time    - goal less than 130/80   - return in 6 months

## 2024-03-26 NOTE — ASSESSMENT & PLAN NOTE
- healing well    - slowly exercising   - encouraged cardiac rehab   - see CT surgery in one week

## 2024-03-26 NOTE — PROGRESS NOTES
OP Anticoagulation Service Note    Date: 3/26/2024    Anticoagulation Summary  As of 3/26/2024      INR goal:  2.0-3.0   TTR:  100.0% (1.6 wk)   INR used for dosin.40 (3/26/2024)   Warfarin maintenance plan:  7.5 mg (5 mg x 1.5) every day   Weekly warfarin total:  52.5 mg   Plan last modified:  Kristi Marks PharmD (3/18/2024)   Next INR check:  2024   Target end date:  Indefinite    Indications    S/P AVR (aortic valve replacement) [Z95.2]                 Anticoagulation Episode Summary       INR check location:      Preferred lab:      Send INR reminders to:      Comments:  On-X Aortic Valve Replacement - lower goal to 1.5 to 2.0 around 24  [facility]  [phone]          Anticoagulation Care Providers       Provider Role Specialty Phone number    [facility]   [phone]          Anticoagulation Patient Findings        Patient's preferred phone number:  [phone]        HPI:   The reason for today's call is to prevent morbidity and mortality from a blood clot and/or stroke and to reduce the risk of bleeding while on a anticoagulant.     PCP:  Mohsen Tamasaby, M.D.  [address] [place] [phone]    Assessment:     INR  therapeutic.     Lab Results   Component Value Date/Time    BUN 13 2024 01:50 AM    CREATININE 0.62 2024 01:50 AM    CREATININE 0.9 10/12/2006 08:58 AM     Lab Results   Component Value Date/Time    HEMOGLOBIN 11.3 (L) 2024 01:50 AM    HEMATOCRIT 34.6 (L) 2024 01:50 AM    PLATELETCT 277 2024 01:50 AM    ALKPHOSPHAT 44 2024 04:00 AM    ASTSGOT 12 2024 04:00 AM    ALTSGPT 19 2024 04:00 AM          Current Outpatient Medications:     aspirin, 81 mg, Oral, DAILY    metoprolol tartrate, 25 mg, Oral, BID    omeprazole, 20 mg, Oral, DAILY    warfarin, Take 1.5 Tablets by mouth every day for 2 days, THEN 1 Tablet every day at 6 PM for 30 days.    divalproex ER, 500 mg, Oral, BID      Plan:     Continue  the same warfarin dose, as noted above.       Follow-up:     As seen above      Additional information discussed with patient:     Asked patient to please call the anticoagulation clinic if they have any signs/symptoms of bleeding and/or thrombosis or any changes to diet or medications.      National recommendations regarding anticoagulation therapy:     The CHEST guidelines recommends frequent INR monitoring at regular intervals (a few days up to a max of 12 weeks) to ensure patients are on the proper dose of warfarin, and patients are not having any complications from therapy.  INRs can dramatically change over a short time period due to diet, medications, and medical conditions.         [facility]  Phone: [phone]  Fax: [phone]  On call: [phone]  General scheduling/information [phone]  For emergencies please dial [phone]  Please do not use "[employer] for urgent matters, call the phone numbers listed above.    This note was created using voice recognition software (Dragon). The accuracy of the dictation is limited by the abilities of the software. I have reviewed the note prior to signing, however some errors in grammar and context are still possible. If you have any questions related to this note please do not hesitate to contact our office.

## 2024-03-26 NOTE — PROGRESS NOTES
Cardiology Follow up Note    DOS: 3/26/2024  [id number]  [facility] Chang    Chief complaint/Reason for consult: post bicuspid aortic valve replacement     HPI: Pt is a 33 y.o. male who presents to the clinic today in follow up for aortic valve replacement. Patient has a past medical history significant for but not limited to: seizure disorder, h/o bicuspid aortic valve S/P replacement, chronic warfarin therapy, hypertension. Patient underwent replacement of his bicuspid aortic valve approximately one month ago. Valve replaced with a  23 mm ON-X valve. Patient sternal incision looks good and is not overly sensitive to palpitation or deep breathing. Referral yo cardiac rehab placed and patient encouraged to go. Patient works as a [employer]  and should still avoid strenuous work loads at this time as sternum can take up to 6 months or longer to fully heal. Discussed importance of warfarin and that this will be lifelong. Patient had some post operative AF which is not uncommon in sternotomy valve replacements. Finish amiodarone dosing and then we will reassess for long term needs for antiarrhythmics. At 33 long term amiodarone usage would almost certainly result in medication toxicity. If necessary will look for alternative antiarrhythmic medications for him. Continue metoprolol at this time. Continue baby aspirin at this time with long term goal of discontinuance to reduce risk of bleeding due to chronic warfarin use. Return to clinic in 6 months.       Past Medical History:   Diagnosis Date    Benign heart murmur     Dr. Raven Reynaga    Breath shortness 12/2020    occasional since intubation 9/2020 no O2    History of COVID-19 09/17/2021    Pneumonia 09/2020    intubated    Seizure disorder (HCC)     Dr. Evans unknown reason last 9/18/20    Snoring     Subglottic stenosis     Anesthesia unable to pass 8-0 ETT during AVR.  7-0 ETT placed without difficulty       Past Surgical History:   Procedure Laterality  Date    AORTIC VALVE REPLACEMENT  2/27/2024    Procedure: REPLACEMENT, AORTIC VALVE;  Surgeon: Kevin David D.O.;  Location: SURGERY [facility];  Service: Cardiothoracic    ECHOCARDIOGRAM, TRANSESOPHAGEAL, INTRAOPERATIVE  2/27/2024    Procedure: ECHOCARDIOGRAM, TRANSESOPHAGEAL, INTRAOPERATIVE;  Surgeon: Kevin David D.O.;  Location: SURGERY [facility];  Service: Cardiothoracic    AORTIC ASCENDING DISSECTION  2/27/2024    Procedure: ROOT ENLARGEMENT AORTA, ASCENDING;  Surgeon: Kevin David D.O.;  Location: SURGERY [facility];  Service: Cardiothoracic    KS LARYNGOSCOPY,DIRCT,OP,BIOPSY N/A 12/14/2021    Procedure: LARYNGOSCOPY,DIRECT,WITH BIOPSY IF INDICATED;  Surgeon: Johnny Martines M.D.;  Location: SURGERY SAME [facility];  Service: Ent    KS BRONCHOSCOPY,TRACH/BRONCH DILATN N/A 12/14/2021    Procedure: TRACHEAL DILATION,TRACHEOPLASTY,USING BALLOON;  Surgeon: Johnny Martines M.D.;  Location: SURGERY SAME DAY [facility];  Service: Ent    LARYNGOSCOPY  2/23/2021    Procedure: LARYNGOSCOPY - DIRECT W/TRACHEAL DILATION;  Surgeon: Johnny Martines M.D.;  Location: SURGERY SAME DAY [facility];  Service: Ent    KS BRONCHOSCOPY,DIAGNOSTIC N/A 12/31/2020    Procedure: BRONCHOSCOPY - WITH DILATION;  Surgeon: Johnny Martines M.D.;  Location: SURGERY SAME DAY [facility];  Service: Ent    LARYNGOSCOPY N/A 12/31/2020    Procedure: LARYNGOSCOPY - DIRECT;  Surgeon: Johnny Martines M.D.;  Location: SURGERY SAME DAY [facility];  Service: Ent    LARYNGOSCOPY N/A 11/20/2020    Procedure: LARYNGOSCOPY - DIRECT W/BALLOON DILATION;  Surgeon: Johnny Martines M.D.;  Location: SURGERY SAME DAY [facility];  Service: Ent    OTHER      tubes in ears     TONSILLECTOMY         Social History     Socioeconomic History    Marital status: Single     Spouse name: Not on file    Number of children: Not on file    Years of education: Not on file    Highest education level: Not on file   Occupational History     Not on file   Tobacco Use    Smoking status: Never    Smokeless tobacco: Never   Vaping Use    Vaping Use: Never used   Substance and Sexual Activity    Alcohol use: Yes     Comment: one beer every 3 weeks or so    Drug use: No    Sexual activity: Not Currently   Other Topics Concern    Not on file   Social History Narrative    Not on file     Social Determinants of Health     Financial Resource Strain: Not on file   Food Insecurity: Not on file   Transportation Needs: Not on file   Physical Activity: Not on file   Stress: Not on file   Social Connections: Not on file   Intimate Partner Violence: Not on file   Housing Stability: Not on file       Family History   Problem Relation Age of Onset    Hypertension Mother        No Known Allergies    Current Outpatient Medications   Medication Sig Dispense Refill    aspirin 81 MG EC tablet Take 1 Tablet by mouth every day.      metoprolol tartrate (LOPRESSOR) 25 MG Tab Take 1 Tablet by mouth 2 times a day. 60 Tablet 2    omeprazole (PRILOSEC) 20 MG delayed-release capsule Take 1 Capsule by mouth every day. 30 Capsule 2    warfarin (COUMADIN) 5 MG Tab Take 1.5 Tablets by mouth every day for 2 days, THEN 1 Tablet every day at 6 PM for 30 days. 45 Tablet 3    divalproex ER (DEPAKOTE ER) 500 MG TABLET SR 24 HR Take 500 mg by mouth 2 (two) times a day.       No current facility-administered medications for this visit.       Vitals:    03/26/24 1041   BP: 120/66   Pulse: 69   Resp: 16   SpO2: 98%         Review of Systems   Constitutional: Negative.  Negative for malaise/fatigue.   HENT: Negative.     Eyes: Negative.    Respiratory: Negative.  Negative for shortness of breath and wheezing.    Cardiovascular:  Negative for chest pain, palpitations, orthopnea, claudication, leg swelling and PND.   Gastrointestinal: Negative.  Negative for nausea.   Genitourinary: Negative.    Musculoskeletal: Negative.    Skin: Negative.    Neurological: Negative.  Negative for dizziness and  "sensory change.   Endo/Heme/Allergies: Negative.  Does not bruise/bleed easily.   Psychiatric/Behavioral:  Negative for depression and hallucinations. The patient is not nervous/anxious.           EKG interpreted by me: Sinus    Physical Exam  Constitutional:       Appearance: Normal appearance.   HENT:      Head: Normocephalic.   Eyes:      Pupils: Pupils are equal, round, and reactive to light.   Neck:      Vascular: No JVD.   Cardiovascular:      Rate and Rhythm: Normal rate and regular rhythm.      Pulses: Normal pulses.      Heart sounds: Normal heart sounds.   Pulmonary:      Effort: Pulmonary effort is normal.      Breath sounds: Normal breath sounds.   Abdominal:      General: Abdomen is flat.      Palpations: Abdomen is soft.   Musculoskeletal:      Cervical back: Normal range of motion.      Right lower leg: No edema.      Left lower leg: No edema.   Skin:     General: Skin is warm and dry.      Comments: Sternal incision healing well    Neurological:      Mental Status: He is alert and oriented to person, place, and time.   Psychiatric:         Mood and Affect: Mood normal.         Behavior: Behavior normal.          Data:  Lipids:   Lab Results   Component Value Date/Time    CHOLSTRLTOT 173 02/23/2024 08:18 PM    TRIGLYCERIDE 98 02/23/2024 08:18 PM    HDL 50 02/23/2024 08:18 PM     (H) 02/23/2024 08:18 PM        BMP:  Lab Results   Component Value Date/Time    SODIUM 136 03/03/2024 0150    POTASSIUM 3.3 (L) 03/03/2024 0150    CHLORIDE 92 (L) 03/03/2024 0150    CO2 33 03/03/2024 0150    GLUCOSE 104 (H) 03/03/2024 0150    BUN 13 03/03/2024 0150    CREATININE 0.62 03/03/2024 0150    CALCIUM 9.0 03/03/2024 0150    ANION 11.0 03/03/2024 0150       GFR:  Lab Results   Component Value Date/Time    IFAFRICA >60 10/22/2020 2045    IFNOTAFR >60 10/22/2020 2045        TSH:   No results found for: \"TSHULTRASEN\"    MAGNESIUM:  Lab Results   Component Value Date/Time    MAGNESIUM 1.8 03/01/2024 1416    " "MAGNESIUM 2.9 (H) 02/27/2024 1150    MAGNESIUM 2.0 02/23/2024 2018        THYROXINE (T4):   No results found for: \"FREEDIR\"     CBC:   Lab Results   Component Value Date/Time    WBC 7.3 03/03/2024 01:50 AM    RBC 4.82 03/03/2024 01:50 AM    HEMOGLOBIN 11.3 (L) 03/03/2024 01:50 AM    HEMATOCRIT 34.6 (L) 03/03/2024 01:50 AM    MCV 71.8 (L) 03/03/2024 01:50 AM    MCH 23.4 (L) 03/03/2024 01:50 AM    MCHC 32.7 03/03/2024 01:50 AM    RDW 34.7 (L) 03/03/2024 01:50 AM    PLATELETCT 277 03/03/2024 01:50 AM    MPV 10.5 03/03/2024 01:50 AM    NEUTSPOLYS 55.00 02/23/2024 08:18 PM    LYMPHOCYTES 36.40 02/23/2024 08:18 PM    MONOCYTES 7.20 02/23/2024 08:18 PM    EOSINOPHILS 0.40 02/23/2024 08:18 PM    BASOPHILS 0.70 02/23/2024 08:18 PM    IMMGRAN 0.30 02/23/2024 08:18 PM    NRBC 0.00 02/23/2024 08:18 PM    NEUTS 4.22 02/23/2024 08:18 PM    LYMPHS 2.79 02/23/2024 08:18 PM    MONOS 0.55 02/23/2024 08:18 PM    EOS 0.03 02/23/2024 08:18 PM    BASO 0.05 02/23/2024 08:18 PM    IMMGRANAB 0.02 02/23/2024 08:18 PM    NRBCAB 0.00 02/23/2024 08:18 PM        CBC w/o DIFF  Lab Results   Component Value Date/Time    WBC 7.3 03/03/2024 01:50 AM    RBC 4.82 03/03/2024 01:50 AM    HEMOGLOBIN 11.3 (L) 03/03/2024 01:50 AM    MCV 71.8 (L) 03/03/2024 01:50 AM    MCH 23.4 (L) 03/03/2024 01:50 AM    MCHC 32.7 03/03/2024 01:50 AM    RDW 34.7 (L) 03/03/2024 01:50 AM    MPV 10.5 03/03/2024 01:50 AM       LIVER:  Lab Results   Component Value Date/Time    ALKPHOSPHAT 44 02/27/2024 04:00 AM    ASTSGOT 12 02/27/2024 04:00 AM    ALTSGPT 19 02/27/2024 04:00 AM    TBILIRUBIN 0.3 02/27/2024 04:00 AM       BNP:  No results found for: \"BNPBTYPENAT\"    PT/INR:  Lab Results   Component Value Date/Time    PROTHROMBTM 17.3 (H) 03/03/2024 01:50 AM    PROTHROMBTM 15.0 (H) 03/02/2024 06:15 AM    PROTHROMBTM 15.3 (H) 03/01/2024 05:50 AM    INR 2.50 03/21/2024 12:00 AM    INR 2.40 03/18/2024 12:00 AM    INR 2.50 03/15/2024 12:00 AM             Impression/Plan:  Warfarin " anticoagulation   - long term follow up with INR clinic   - importance of therapeutic levels of INR explained in detail    S/P AVR (aortic valve replacement)   - healing well    - slowly exercising   - encouraged cardiac rehab   - see CT surgery in one week     HTN (hypertension)   - continue aspirin and metoprolol at this time    - goal less than 130/80   - return in 6 months     Encounter for monitoring amiodarone therapy   - finish loading dose and then stop   - will reassess long term need for antiarrhythmic post washout   -              A total of 38 minutes of time was spent on day of encounter reviewing medical record, performing history and examination, counseling, ordering medication/test/consults, collaborating with referring service, and documentation.    Wilmer Giraldo AGACNP-EP  Cardiac Electrophysiology

## 2024-04-01 ENCOUNTER — APPOINTMENT (OUTPATIENT)
Dept: CARDIOTHORACIC SURGERY | Facility: MEDICAL CENTER | Age: 34
End: 2024-04-01
Payer: COMMERCIAL

## 2024-04-01 VITALS
TEMPERATURE: 98 F | OXYGEN SATURATION: 98 % | HEART RATE: 75 BPM | WEIGHT: 231.4 LBS | DIASTOLIC BLOOD PRESSURE: 68 MMHG | SYSTOLIC BLOOD PRESSURE: 122 MMHG | BODY MASS INDEX: 32.4 KG/M2 | HEIGHT: 71 IN

## 2024-04-01 DIAGNOSIS — Z98.890 POST-OPERATIVE STATE: ICD-10-CM

## 2024-04-01 PROCEDURE — 3078F DIAST BP <80 MM HG: CPT | Performed by: NURSE PRACTITIONER

## 2024-04-01 PROCEDURE — 3074F SYST BP LT 130 MM HG: CPT | Performed by: NURSE PRACTITIONER

## 2024-04-01 PROCEDURE — 99024 POSTOP FOLLOW-UP VISIT: CPT | Performed by: NURSE PRACTITIONER

## 2024-04-01 ASSESSMENT — FIBROSIS 4 INDEX: FIB4 SCORE: 0.33

## 2024-04-02 ENCOUNTER — TELEPHONE (OUTPATIENT)
Dept: VASCULAR LAB | Facility: MEDICAL CENTER | Age: 34
End: 2024-04-02
Payer: COMMERCIAL

## 2024-04-02 NOTE — TELEPHONE ENCOUNTER
S/w pt - scheduled POCT INR for 4/4.  Pt to continue with warfarin 7.5mg daily.    Emma Bonds, PharmD

## 2024-04-02 NOTE — TELEPHONE ENCOUNTER
Caller: Umesh Chang     Topic/issue: His home health nurse tried to take INR today and her machine was not working can he complete INR in office this week?     Callback Number: [phone]    Thank You   Dorina MALONE

## 2024-04-04 ENCOUNTER — ANTICOAGULATION VISIT (OUTPATIENT)
Dept: VASCULAR LAB | Facility: MEDICAL CENTER | Age: 34
End: 2024-04-04
Attending: INTERNAL MEDICINE
Payer: COMMERCIAL

## 2024-04-04 DIAGNOSIS — Z95.2 S/P AVR (AORTIC VALVE REPLACEMENT): ICD-10-CM

## 2024-04-04 LAB — INR PPP: 1.7 (ref 2–3.5)

## 2024-04-04 PROCEDURE — 99212 OFFICE O/P EST SF 10 MIN: CPT | Performed by: NURSE PRACTITIONER

## 2024-04-04 PROCEDURE — 85610 PROTHROMBIN TIME: CPT

## 2024-04-04 NOTE — PROGRESS NOTES
Anticoagulation Summary  As of 2024      INR goal:  2.0-3.0   TTR:  80.7% (2.9 wk)   INR used for dosin.70 (2024)   Warfarin maintenance plan:  10 mg (5 mg x 2) every Thu; 7.5 mg (5 mg x 1.5) all other days   Weekly warfarin total:  55 mg   Plan last modified:  KERON RendonPKATIE (2024)   Next INR check:  2024   Target end date:  Indefinite    Indications    S/P AVR (aortic valve replacement) [Z95.2]                 Anticoagulation Episode Summary       INR check location:      Preferred lab:      Send INR reminders to:      Comments:  On-X Aortic Valve Replacement - lower goal to 1.5 to 2.0 around 24  [facility]  [phone]          Anticoagulation Care Providers       Provider Role Specialty Phone number    [facility]   [phone]                  Refer to Patient Findings for HPI:  Patient Findings       Positives:  Change in medications    Negatives:  Signs/symptoms of thrombosis, Signs/symptoms of bleeding, Laboratory test error suspected, Change in health, Change in alcohol use, Change in activity, Upcoming invasive procedure, Emergency department visit, Upcoming dental procedure, Missed doses, Extra doses, Change in diet/appetite, Hospital admission, Bruising, Other complaints    Comments:  He is no longer taking amiodarone and omeprazole.            Verified current warfarin dosing schedule.    Medications reconciled: Yes  Pt is on antiplatelet therapy with ASA 81 mg for mech AVR.      A/P   INR is subtherapeutic. He is no longer taking amio or PPI. Will increase his regimen.    Warfarin dosing recommendation: Began taking 10 mg on , 7.5 mg AODs.    Pt educated to contact our clinic with any changes in medications or s/s of bleeding or thrombosis. Pt is aware to seek immediate medical attention for falls, head injury or deep cuts.    Please check INR on Monday, .    Note faxed to Kailey at [phone]    ALE Rendon

## 2024-04-09 ENCOUNTER — ANTICOAGULATION MONITORING (OUTPATIENT)
Dept: VASCULAR LAB | Facility: MEDICAL CENTER | Age: 34
End: 2024-04-09
Payer: COMMERCIAL

## 2024-04-09 ENCOUNTER — TELEPHONE (OUTPATIENT)
Dept: VASCULAR LAB | Facility: MEDICAL CENTER | Age: 34
End: 2024-04-09
Payer: COMMERCIAL

## 2024-04-09 DIAGNOSIS — Z95.2 S/P AVR (AORTIC VALVE REPLACEMENT): ICD-10-CM

## 2024-04-09 LAB — INR PPP: 1.9 (ref 2–3.5)

## 2024-04-09 NOTE — TELEPHONE ENCOUNTER
Caller: Christine baeza [facility]    INR: 1.9    Date Taken: 4/9/24    Callback Number: [phone]    Thank you,  Jelly OJEDA

## 2024-04-09 NOTE — PROGRESS NOTES
Anticoagulation Summary  As of 2024      INR goal:  2.0-3.0   TTR:  64.6% (3.6 wk)   INR used for dosin.90 (2024)   Warfarin maintenance plan:  10 mg (5 mg x 2) every Mon, Wed, Fri; 7.5 mg (5 mg x 1.5) all other days   Weekly warfarin total:  60 mg   Plan last modified:  Kishore Hernandes PharmD (2024)   Next INR check:  2024   Target end date:  Indefinite    Indications    S/P AVR (aortic valve replacement) [Z95.2]                 Anticoagulation Episode Summary       INR check location:      Preferred lab:      Send INR reminders to:      Comments:  On-X Aortic Valve Replacement - lower goal to 1.5 to 2.0 around 24  [facility] JACKSON [phone]          Anticoagulation Care Providers       Provider Role Specialty Phone number    [facility]   [phone]          Anticoagulation Patient Findings  Patient Findings       Positives:  Extra doses (took 10 mg on Fri)    Negatives:  Signs/symptoms of thrombosis, Signs/symptoms of bleeding, Laboratory test error suspected, Change in health, Change in alcohol use, Change in activity, Upcoming invasive procedure, Emergency department visit, Upcoming dental procedure, Missed doses, Change in medications, Change in diet/appetite, Hospital admission, Bruising, Other complaints            Called and spoke to patient .    INR slightly subtherapeutic following dose increase and extra dose. Anticipate warfarin requirements will continue to increase given loss of DDI with amiodarone. Will increase regimen further.    Warfarin Plan: Increase to 10 mg Mon/Wed/Fri, 7.5 mg all other days    Next INR in 1 week(s) via [facility] Hernandes, AriellaD, BCACP

## 2024-04-09 NOTE — TELEPHONE ENCOUNTER
INR was expected to be drawn 4/8  We have not received result  S/w pt - Kailey JACKSON is checking INR today - will await INR results    Emma Bonds, PharmD

## 2024-04-16 ENCOUNTER — TELEPHONE (OUTPATIENT)
Dept: VASCULAR LAB | Facility: MEDICAL CENTER | Age: 34
End: 2024-04-16
Payer: COMMERCIAL

## 2024-04-16 ENCOUNTER — ANTICOAGULATION MONITORING (OUTPATIENT)
Dept: VASCULAR LAB | Facility: MEDICAL CENTER | Age: 34
End: 2024-04-16
Payer: COMMERCIAL

## 2024-04-16 DIAGNOSIS — Z95.2 S/P AVR (AORTIC VALVE REPLACEMENT): ICD-10-CM

## 2024-04-16 LAB — INR PPP: 1.7 (ref 2–3.5)

## 2024-04-16 NOTE — TELEPHONE ENCOUNTER
INR READING    PT Name: Umesh Chang    PT Reports INR Value: 1.7    Date of INR Results: 4/16/2024    Concerns/Questions Reported: or N/A: N/A    Callback Number: [phone] ([place])

## 2024-04-16 NOTE — PROGRESS NOTES
Anticoagulation Summary  As of 2024      INR goal:  2.0-3.0   TTR:  50.4% (1.1 mo)   INR used for dosin.70 (2024)   Warfarin maintenance plan:  10 mg (5 mg x 2) every day   Weekly warfarin total:  70 mg   Plan last modified:  Emma Bonds, Haseeb (2024)   Next INR check:  2024   Target end date:  Indefinite    Indications    S/P AVR (aortic valve replacement) [Z95.2]                 Anticoagulation Episode Summary       INR check location:      Preferred lab:      Send INR reminders to:      Comments:  On-X Aortic Valve Replacement - lower goal to 1.5 to 2.0 around 24  [facility]  [phone]          Anticoagulation Care Providers       Provider Role Specialty Phone number    [facility]   [phone]          Anticoagulation Patient Findings  Patient Findings       Negatives:  Signs/symptoms of thrombosis, Signs/symptoms of bleeding, Laboratory test error suspected, Change in health, Change in alcohol use, Change in activity, Upcoming invasive procedure, Emergency department visit, Upcoming dental procedure, Missed doses, Extra doses, Change in medications, Change in diet/appetite, Hospital admission, Bruising, Other complaints            INR is subtherapeutic    Called and spoke to patient.    Warfarin Plan: Increase to 10mg daily ; pt stopped amiodarone on 24 which is why his INR has been subtherapeutic    Next INR in 3 day(s) via Kailey .     Emma Bonds, PharmD

## 2024-04-19 ENCOUNTER — ANTICOAGULATION MONITORING (OUTPATIENT)
Dept: VASCULAR LAB | Facility: MEDICAL CENTER | Age: 34
End: 2024-04-19
Payer: COMMERCIAL

## 2024-04-19 DIAGNOSIS — Z79.01 WARFARIN ANTICOAGULATION: ICD-10-CM

## 2024-04-19 DIAGNOSIS — Z95.2 S/P AVR (AORTIC VALVE REPLACEMENT): ICD-10-CM

## 2024-04-19 LAB — INR PPP: 2.3 (ref 2–3.5)

## 2024-04-19 NOTE — PROGRESS NOTES
Pt c/b - confirmed INR & warfarin dosing w/ him. We will plan to obtain repeat INR on 4/23.    Jordy Silver, AriellaD, BCACP

## 2024-04-19 NOTE — PROGRESS NOTES
OP Anticoagulation Service Note    Date: 2024    Anticoagulation Summary  As of 2024      INR goal:  2.0-3.0   TTR:  50.4% (1.2 mo)   INR used for dosin.30 (2024)   Warfarin maintenance plan:  10 mg (5 mg x 2) every day   Weekly warfarin total:  70 mg   Plan last modified:  Emma Bonds, PharmD (2024)   Next INR check:  2024   Target end date:  Indefinite    Indications    S/P AVR (aortic valve replacement) [Z95.2]                 Anticoagulation Episode Summary       INR check location:      Preferred lab:      Send INR reminders to:      Comments:  On-X Aortic Valve Replacement - lower goal to 1.5 to 2.0 around 24  [facility]  [phone]          Anticoagulation Care Providers       Provider Role Specialty Phone number    [facility]   [phone]          Anticoagulation Patient Findings        Patient's preferred phone number:  [phone]        HPI:   The reason for today's call is to prevent morbidity and mortality from a blood clot and/or stroke and to reduce the risk of bleeding while on a anticoagulant.     PCP:  Mohsen Tamasaby, M.D.  [facility] [address]    Assessment:     INR  therapeutic.     Lab Results   Component Value Date/Time    BUN 13 2024 01:50 AM    CREATININE 0.62 2024 01:50 AM    CREATININE 0.9 10/12/2006 08:58 AM     Lab Results   Component Value Date/Time    HEMOGLOBIN 11.3 (L) 2024 01:50 AM    HEMATOCRIT 34.6 (L) 2024 01:50 AM    PLATELETCT 277 2024 01:50 AM    ALKPHOSPHAT 44 2024 04:00 AM    ASTSGOT 12 2024 04:00 AM    ALTSGPT 19 2024 04:00 AM          Current Outpatient Medications:     aspirin, 81 mg, Oral, DAILY    metoprolol tartrate, 25 mg, Oral, BID    divalproex ER, 500 mg, Oral, BID      Plan:     Continue the same warfarin dose, as noted above.       Follow-up:     As seen above      Additional information discussed with patient:     Asked patient  to please call the anticoagulation clinic if they have any signs/symptoms of bleeding and/or thrombosis or any changes to diet or medications.      National recommendations regarding anticoagulation therapy:     The CHEST guidelines recommends frequent INR monitoring at regular intervals (a few days up to a max of 12 weeks) to ensure patients are on the proper dose of warfarin, and patients are not having any complications from therapy.  INRs can dramatically change over a short time period due to diet, medications, and medical conditions.         [facility]  Phone: [phone]  Fax: [phone]  On call: [phone]  General scheduling/information [phone]  For emergencies please dial 911  Please do not use [employer] for urgent matters, call the phone numbers listed above.    This note was created using voice recognition software (Dragon). The accuracy of the dictation is limited by the abilities of the software. I have reviewed the note prior to signing, however some errors in grammar and context are still possible. If you have any questions related to this note please do not hesitate to contact our office.

## 2024-04-22 ENCOUNTER — TELEPHONE (OUTPATIENT)
Dept: VASCULAR LAB | Facility: MEDICAL CENTER | Age: 34
End: 2024-04-22
Payer: COMMERCIAL

## 2024-04-22 NOTE — TELEPHONE ENCOUNTER
Caller: Umesh Chang    Topic/issue: Patient is confused about his next INR date. His notes say 4/23, but then in the summary part the next INR date is listed as 4/26. Please advise.     Callback Number: [phone]    Thank you,  Jelly OJEDA

## 2024-04-22 NOTE — TELEPHONE ENCOUNTER
S/w pt - we will keep INR check for 04/23/24. Updated last AC encounter to reflect the accurate next INR date.    Emma Bonds, PharmD

## 2024-04-23 ENCOUNTER — TELEPHONE (OUTPATIENT)
Dept: VASCULAR LAB | Facility: MEDICAL CENTER | Age: 34
End: 2024-04-23
Payer: COMMERCIAL

## 2024-04-23 ENCOUNTER — ANTICOAGULATION MONITORING (OUTPATIENT)
Dept: VASCULAR LAB | Facility: MEDICAL CENTER | Age: 34
End: 2024-04-23
Payer: COMMERCIAL

## 2024-04-23 DIAGNOSIS — Z95.2 S/P AVR (AORTIC VALVE REPLACEMENT): ICD-10-CM

## 2024-04-23 LAB — INR PPP: 1.9 (ref 2–3.5)

## 2024-04-23 RX ORDER — WARFARIN SODIUM 10 MG/1
10 TABLET ORAL DAILY
Qty: 90 TABLET | Refills: 1 | Status: SHIPPED | OUTPATIENT
Start: 2024-04-23

## 2024-04-23 NOTE — TELEPHONE ENCOUNTER
INR Reading    Called in by:  [facility]    4/23/24  INR: 1.9    Call Back - Umesh - [phone]    Thank you,   Nancy HAND    Thank you   Nancy HAND

## 2024-04-23 NOTE — PROGRESS NOTES
Anticoagulation Summary  As of 2024      INR goal:  2.0-3.0   TTR:  52.9% (1.3 mo)   INR used for dosin.90 (2024)   Warfarin maintenance plan:  10 mg (5 mg x 2) every day   Weekly warfarin total:  70 mg   Plan last modified:  Emma Bonds, PharmD (2024)   Next INR check:  2024   Target end date:  Indefinite    Indications    S/P AVR (aortic valve replacement) [Z95.2]                 Anticoagulation Episode Summary       INR check location:      Preferred lab:      Send INR reminders to:      Comments:  On-X Aortic Valve Replacement - lower goal to 1.5 to 2.0 around 24  [facility]  [phone]          Anticoagulation Care Providers       Provider Role Specialty Phone number    [facility]   [phone]          Anticoagulation Patient Findings  Patient Findings       Positives:  Change in activity (walking more)    Negatives:  Signs/symptoms of thrombosis, Signs/symptoms of bleeding, Laboratory test error suspected, Change in health, Change in alcohol use, Upcoming invasive procedure, Emergency department visit, Upcoming dental procedure, Missed doses, Extra doses, Change in medications, Change in diet/appetite, Hospital admission, Bruising, Other complaints            INR is slightly subtherapeutic    Called and spoke to patient.    Warfarin Plan: Bolus with 15mg tonight, then Continue regimen as listed above.    Next INR in 3 day(s) via Ka[facility] .    Emma Bonds, PharmD

## 2024-04-23 NOTE — TELEPHONE ENCOUNTER
Caller: Umesh Chang    Topic/issue: warfarin (Coumadin) tablet 5 mg     Patient started taking 2 pills a day instead of the 1 pill and will be out in 4 days, the pharmacy will not let him refill for a few weeks. This medication states discontinued on his chart.     Callback Number: [phone]    Thank you,   Lizeth SALEH

## 2024-04-24 ENCOUNTER — NON-PROVIDER VISIT (OUTPATIENT)
Dept: CARDIOLOGY | Facility: MEDICAL CENTER | Age: 34
End: 2024-04-24
Attending: NURSE PRACTITIONER
Payer: COMMERCIAL

## 2024-04-24 DIAGNOSIS — R00.2 PALPITATIONS: ICD-10-CM

## 2024-04-24 LAB — EKG IMPRESSION: NORMAL

## 2024-04-24 PROCEDURE — 93010 ELECTROCARDIOGRAM REPORT: CPT | Performed by: INTERNAL MEDICINE

## 2024-04-24 PROCEDURE — 93005 ELECTROCARDIOGRAM TRACING: CPT

## 2024-04-24 NOTE — PROGRESS NOTES
Patient was here today for EKG. EKG performed and transferred into patients chart. Paper copy of EKG given to Temitope BERMUDEZ. RN will contact patient to follow up unless patient is symptomatic.   Is patient reporting any symptoms? No   If yes, RN to visit exam room

## 2024-04-26 ENCOUNTER — ANTICOAGULATION MONITORING (OUTPATIENT)
Dept: VASCULAR LAB | Facility: MEDICAL CENTER | Age: 34
End: 2024-04-26
Payer: COMMERCIAL

## 2024-04-26 DIAGNOSIS — Z95.2 S/P AVR (AORTIC VALVE REPLACEMENT): ICD-10-CM

## 2024-04-26 LAB — INR PPP: 1.7 (ref 2–3.5)

## 2024-04-26 NOTE — PROGRESS NOTES
Anticoagulation Summary  As of 2024      INR goal:  2.0-3.0   TTR:  49.1% (1.4 mo)   INR used for dosin.70 (2024)   Warfarin maintenance plan:  15 mg (5 mg x 3) every Mon, Fri; 10 mg (5 mg x 2) all other days   Weekly warfarin total:  80 mg   Plan last modified:  Jordy Silver PharmD (2024)   Next INR check:  2024   Target end date:  Indefinite    Indications    S/P AVR (aortic valve replacement) [Z95.2]                 Anticoagulation Episode Summary       INR check location:      Preferred lab:      Send INR reminders to:      Comments:  On-X Aortic Valve Replacement - lower goal to 1.5 to 2.0 around 24  [facility] JACKSON [phone]          Anticoagulation Care Providers       Provider Role Specialty Phone number    [facility]   [phone]            Refer to Anticoagulation Patient Findings for HPI  Patient Findings       Positives:  Change in medications (DC'd amiodarone a couple of weeks ago)    Negatives:  Signs/symptoms of thrombosis, Signs/symptoms of bleeding, Laboratory test error suspected, Change in health, Change in alcohol use, Change in activity, Upcoming invasive procedure, Emergency department visit, Upcoming dental procedure, Missed doses, Extra doses, Change in diet/appetite, Hospital admission, Bruising, Other complaints            Spoke with pt.  INR is SUB therapeutic.     Pt verifies warfarin weekly dosing.     Will have pt begin newly increased regimen of 15 mg Mon/Fri and 10 mg ROW.    Repeat INR in 3 days via Kailey JACKSON.     Jordy Silver, PharmD, BCACP

## 2024-04-29 ENCOUNTER — OFFICE VISIT (OUTPATIENT)
Dept: VASCULAR LAB | Facility: MEDICAL CENTER | Age: 34
End: 2024-04-29
Attending: INTERNAL MEDICINE
Payer: COMMERCIAL

## 2024-04-29 VITALS
HEIGHT: 71 IN | DIASTOLIC BLOOD PRESSURE: 91 MMHG | BODY MASS INDEX: 32.2 KG/M2 | WEIGHT: 230 LBS | SYSTOLIC BLOOD PRESSURE: 144 MMHG | HEART RATE: 69 BPM

## 2024-04-29 DIAGNOSIS — Q23.1 BICUSPID AORTIC VALVE: ICD-10-CM

## 2024-04-29 DIAGNOSIS — Z95.2 S/P AVR (AORTIC VALVE REPLACEMENT): ICD-10-CM

## 2024-04-29 DIAGNOSIS — I77.819 AORTIC ECTASIA (HCC): ICD-10-CM

## 2024-04-29 PROCEDURE — 99212 OFFICE O/P EST SF 10 MIN: CPT

## 2024-04-29 ASSESSMENT — ENCOUNTER SYMPTOMS
FOCAL WEAKNESS: 0
PALPITATIONS: 0
SHORTNESS OF BREATH: 0
SPEECH CHANGE: 0

## 2024-04-29 ASSESSMENT — FIBROSIS 4 INDEX: FIB4 SCORE: 0.33

## 2024-04-29 NOTE — PROGRESS NOTES
VASCULAR MEDICINE CLINIC - INITIAL VISIT  04/29/24     Umesh Chang is a 33 y.o. male  who has been referred for vascular medicine evaluation and management   Referring provider: CT surgery    Subjective      HPI:   Referred for e/m of bicuspid AV and thoracic aortic ectasia   Had SAVR in Feb 2024  No ascending repair  BAV discovered on routine exam due to heart murmur  Patient thought he was asymptomatic prior to surgery but feels even better now than he did before surgery  He is doing some walking  He has been referred to [facility] but has not started  He has no cardiovascular complaints  He states that his mother has valvular heart disease and may need a replacement but is not sure if she actually has BAV  No other family members affected  Denies any history of dyslipidemia or high blood pressure  No use of stimulants or smoking  No unusual infections or CTD  On aspirin and metoprolol    Patient Active Problem List    Diagnosis Date Noted    Aortic ectasia (HCC) 04/29/2024    Warfarin anticoagulation 03/26/2024    Encounter for monitoring amiodarone therapy 03/26/2024    S/P AVR (aortic valve replacement) 02/27/2024    On mechanically assisted ventilation (HCC) 02/27/2024    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    Dilated aortic root (HCC) 02/26/2024    Obesity (BMI 30-39.9) 02/24/2024    HTN (hypertension) 02/24/2024    Aortic stenosis 02/23/2024    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    Aortic insufficiency 12/14/2012    Bicuspid aortic valve 12/14/2012    Heart murmur 12/14/2012    Seizure disorder (HCC)       Past Surgical History:   Procedure Laterality Date    AORTIC VALVE REPLACEMENT  2/27/2024    Procedure: REPLACEMENT, AORTIC VALVE;  Surgeon: Kevin David D.O.;  Location: [facility];  Service: Cardiothoracic    ECHOCARDIOGRAM, TRANSESOPHAGEAL, INTRAOPERATIVE  2/27/2024    Procedure: ECHOCARDIOGRAM, TRANSESOPHAGEAL, INTRAOPERATIVE;  Surgeon: Kevin David D.O.;  Location: [facility];   Service: Cardiothoracic    AORTIC ASCENDING DISSECTION  2/27/2024    Procedure: ROOT ENLARGEMENT AORTA, ASCENDING;  Surgeon: Kevin David D.O.;  Location: SURGERY [facility];  Service: Cardiothoracic    MA LARYNGOSCOPY,DIRCT,OP,BIOPSY N/A 12/14/2021    Procedure: LARYNGOSCOPY,DIRECT,WITH BIOPSY IF INDICATED;  Surgeon: Johnny Martines M.D.;  Location: SURGERY SAME DAY [facility];  Service: Ent    MA BRONCHOSCOPY,TRACH/BRONCH DILATN N/A 12/14/2021    Procedure: TRACHEAL DILATION,TRACHEOPLASTY,USING BALLOON;  Surgeon: Johnny Martines M.D.;  Location: SURGERY SAME DAY [facility];  Service: Ent    LARYNGOSCOPY  2/23/2021    Procedure: LARYNGOSCOPY - DIRECT W/TRACHEAL DILATION;  Surgeon: Johnny Martines M.D.;  Location: SURGERY SAME DAY [facility];  Service: Ent    MA BRONCHOSCOPY,DIAGNOSTIC N/A 12/31/2020    Procedure: BRONCHOSCOPY - WITH DILATION;  Surgeon: Johnny Martines M.D.;  Location: SURGERY SAME DAY [facility];  Service: Ent    LARYNGOSCOPY N/A 12/31/2020    Procedure: LARYNGOSCOPY - DIRECT;  Surgeon: Johnny Martines M.D.;  Location: SURGERY SAME DAY [facility];  Service: Ent    LARYNGOSCOPY N/A 11/20/2020    Procedure: LARYNGOSCOPY - DIRECT W/BALLOON DILATION;  Surgeon: Johnny Martines M.D.;  Location: SURGERY SAME DAY [facility];  Service: Ent    OTHER      tubes in ears     TONSILLECTOMY        Family History   Problem Relation Age of Onset    Hypertension Mother       Current Outpatient Medications on File Prior to Visit   Medication Sig Dispense Refill    warfarin (COUMADIN) 10 MG Tab Take 1 Tablet by mouth every day. As directed by [facility] 90 Tablet 1    aspirin 81 MG EC tablet Take 1 Tablet by mouth every day.      metoprolol tartrate (LOPRESSOR) 25 MG Tab Take 1 Tablet by mouth 2 times a day. 60 Tablet 2    divalproex ER (DEPAKOTE ER) 500 MG TABLET SR 24 HR Take 500 mg by mouth 2 (two) times a day.       No current facility-administered medications on file prior  "to visit.      ALLERGIES  Patient has no known allergies.    Social History     Tobacco Use    Smoking status: Never    Smokeless tobacco: Never   Vaping Use    Vaping Use: Never used   Substance Use Topics    Alcohol use: Yes     Comment: one beer every 3 weeks or so    Drug use: No     DIET AND EXERCISE:  Weight Change: lost weight after surgery; still a bit down from baselie  Diet: relatively HH; low in green leafy veg  Exercise: moderate regular exercise program- walking every day    Review of Systems   Constitutional:  Negative for malaise/fatigue.   Respiratory:  Negative for shortness of breath.    Cardiovascular:  Negative for chest pain, palpitations and leg swelling.   Neurological:  Negative for speech change and focal weakness.          Objective    Vitals:    04/29/24 1450 04/29/24 1454   BP: (!) 144/85 (!) 144/91   BP Location: Left arm Left arm   Patient Position: Sitting Sitting   BP Cuff Size: Adult Adult   Pulse: 69 69   Weight: 104 kg (230 lb)    Height: 1.803 m (5' 11\")       BP Readings from Last 4 Encounters:   04/29/24 (!) 144/91   04/01/24 122/68   03/26/24 120/66   03/03/24 118/73      Body mass index is 32.08 kg/m².   Wt Readings from Last 4 Encounters:   04/29/24 104 kg (230 lb)   04/01/24 105 kg (231 lb 6.4 oz)   03/26/24 100 kg (221 lb)   03/03/24 97.8 kg (215 lb 9.8 oz)      Physical Exam  Vitals reviewed.   Constitutional:       General: He is not in acute distress.     Appearance: He is not diaphoretic.   HENT:      Head: Normocephalic and atraumatic.   Eyes:      General: No scleral icterus.     Conjunctiva/sclera: Conjunctivae normal.   Neck:      Vascular: No carotid bruit.   Cardiovascular:      Rate and Rhythm: Normal rate and regular rhythm.      Heart sounds: Murmur heard.   Pulmonary:      Effort: Pulmonary effort is normal. No respiratory distress.      Breath sounds: Normal breath sounds. No wheezing or rales.   Musculoskeletal:      Right lower leg: No edema.      Left " "lower leg: No edema.   Skin:     Coloration: Skin is not pale.   Neurological:      General: No focal deficit present.      Mental Status: He is alert and oriented to person, place, and time.      Cranial Nerves: No cranial nerve deficit.      Coordination: Coordination normal.      Gait: Gait is intact. Gait normal.   Psychiatric:         Mood and Affect: Mood and affect normal.         Behavior: Behavior normal.          DATA REVIEW    Lab Results   Component Value Date/Time    CHOLSTRLTOT 173 02/23/2024 08:18 PM     (H) 02/23/2024 08:18 PM    HDL 50 02/23/2024 08:18 PM    TRIGLYCERIDE 98 02/23/2024 08:18 PM       Lab Results   Component Value Date/Time    SODIUM 136 03/03/2024 01:50 AM    POTASSIUM 3.3 (L) 03/03/2024 01:50 AM    CHLORIDE 92 (L) 03/03/2024 01:50 AM    CO2 33 03/03/2024 01:50 AM    GLUCOSE 104 (H) 03/03/2024 01:50 AM    BUN 13 03/03/2024 01:50 AM    CREATININE 0.62 03/03/2024 01:50 AM    CREATININE 0.9 10/12/2006 08:58 AM     Lab Results   Component Value Date/Time    ALKPHOSPHAT 44 02/27/2024 04:00 AM    ASTSGOT 12 02/27/2024 04:00 AM    ALTSGPT 19 02/27/2024 04:00 AM    TBILIRUBIN 0.3 02/27/2024 04:00 AM       Lab Results   Component Value Date/Time    HBA1C 5.3 02/23/2024 08:18 PM       No results found for: \"MICROALBCALC\", \"MALBCRT\", \"MALBEXCR\", \"PZPCPR34\", \"MICROALBUR\", \"MICRALB\", \"UMICROALBUM\", \"MICROALBTIM\"      Cardiovascular Imaging:    Multiple imaging studies available in EMR and reviewed with patient at todays visit         Medical Decision Making:  Today's Assessment / Status / Plan:     1. S/P AVR (aortic valve replacement)  Referral to Cardiac Rehab      2. Bicuspid aortic valve        3. Aortic ectasia (HCC)           Etiology of Established CVD if Present:     1) BAV status post On-x mechanical valve February 2024  Patient doing well postoperatively  As we discussed, he will need lifelong surveillance to rule out associated aortic aneurysm  -Echocardiogram February 2025 " unless ordered sooner by cardiology  -Medical management lifestyle modification as per below  -Recommended screening echocardiogram for first-degree relatives    LIPID MANAGEMENT  Qualifies for Statin Therapy Based on 2018 ACC/AHA Guidelines: no  No evidence of atherosclerosis on preoperative evaluation  Lipid panel seems reasonable  Plan:   -Continue lifestyle modification  -Repeat lipid panel per PCP    BLOOD PRESSURE MANAGEMENT  BP Goal ACC/AHA (2017) goal <130/80  Home BP at goal:  yes  Office BP at goal:  no, but has been at previous vis Plan:   Monitoring:   - start/continue home BP monitoring, reviewed correct technique, provide BP log and instructions  -Consider ABPM if continued evidence of possible whitecoat hypertension  Medications:  -Continue metoprolol 25 mg twice daily pending further recommendations from cardiology    GLYCEMIC MANAGEMENT Normal  -Continue lifestyle modification    ANTITHROMBOTIC THERAPY   -Continue indefinite low-dose aspirin pending further recommendations from cardiology  -Continue indefinite warfarin but can decrease INR goal given presence of On-X valve in the future    LIFESTYLE INTERVENTIONS:    Tobacco Use: Never user  - continued complete avoidance of all tobacco products     Physical Activity: Continue his current walking.  Referred to [facility]    Weight Management and/or Nutrition: Continue overall healthy eating plan and maintenance of weight    OTHER:     -Seizure disorder -currently on Depakote.  He states his levels were checked recently and were in range.  Defer further management to neurology    Studies to Be Obtained: Echocardiogram February 2025 if not ordered sooner by cardiology  Labs to Be Obtained: None currently    Follow up in: 6 months    Michael J Bloch, M.D.   [facility]  [facility]  [phone]

## 2024-04-30 ENCOUNTER — TELEPHONE (OUTPATIENT)
Dept: VASCULAR LAB | Facility: MEDICAL CENTER | Age: 34
End: 2024-04-30
Payer: COMMERCIAL

## 2024-04-30 ENCOUNTER — ANTICOAGULATION MONITORING (OUTPATIENT)
Dept: VASCULAR LAB | Facility: MEDICAL CENTER | Age: 34
End: 2024-04-30
Payer: COMMERCIAL

## 2024-04-30 DIAGNOSIS — Z95.2 S/P AVR (AORTIC VALVE REPLACEMENT): ICD-10-CM

## 2024-04-30 LAB — INR PPP: 2.4 (ref 2–3.5)

## 2024-04-30 NOTE — PROGRESS NOTES
Anticoagulation Summary  As of 2024      INR goal:  2.0-3.0   TTR:  49.8% (1.5 mo)   INR used for dosin.40 (2024)   Warfarin maintenance plan:  15 mg (5 mg x 3) every Mon, Fri; 10 mg (5 mg x 2) all other days   Weekly warfarin total:  80 mg   Plan last modified:  Ariella EscobedoD (2024)   Next INR check:  2024   Priority:  Routine   Target end date:  Indefinite    Indications    S/P AVR (aortic valve replacement) [Z95.2]                 Anticoagulation Episode Summary       INR check location:      Preferred lab:      Send INR reminders to:      Comments:  On-X Aortic Valve Replacement - lower goal to 1.5 to 2.0 around 24          Anticoagulation Care Providers       Provider Role Specialty Phone number    [facility]   [phone]          Anticoagulation Patient Findings  Patient Findings       Negatives:  Signs/symptoms of thrombosis, Signs/symptoms of bleeding, Laboratory test error suspected, Change in health, Change in alcohol use, Change in activity, Upcoming invasive procedure, Emergency department visit, Upcoming dental procedure, Missed doses, Extra doses, Change in medications, Change in diet/appetite, Hospital admission, Bruising, Other complaints            INR is therapeutic    Called and spoke to patient.    Pt is on antiplatelet therapy with ASA for AVR.    Warfarin Plan: Continue regimen as listed above.    Next INR in 1 week via [facility]    Emma Bonds, PharmD

## 2024-04-30 NOTE — TELEPHONE ENCOUNTER
PT Name: Umesh Chang    PT Reports INR Value: 2.4    Date of INR Results: 04.30.24    Concerns/Questions Reported: or N/A: N/A     Callback Number: [phone]    Thank you,     Hilda CLEMONS

## 2024-05-07 ENCOUNTER — ANTICOAGULATION VISIT (OUTPATIENT)
Dept: VASCULAR LAB | Facility: MEDICAL CENTER | Age: 34
End: 2024-05-07
Attending: INTERNAL MEDICINE
Payer: COMMERCIAL

## 2024-05-07 VITALS — DIASTOLIC BLOOD PRESSURE: 85 MMHG | HEART RATE: 62 BPM | SYSTOLIC BLOOD PRESSURE: 145 MMHG

## 2024-05-07 DIAGNOSIS — Z95.2 S/P AVR (AORTIC VALVE REPLACEMENT): ICD-10-CM

## 2024-05-07 LAB — INR PPP: 2.2 (ref 2–3.5)

## 2024-05-07 NOTE — PROGRESS NOTES
Anticoagulation Summary  As of 2024      INR goal:  2.0-3.0   TTR:  56.5% (1.8 mo)   INR used for dosin.20 (2024)   Warfarin maintenance plan:  15 mg (10 mg x 1.5) every Mon, Fri; 10 mg (10 mg x 1) all other days   Weekly warfarin total:  80 mg   Plan last modified:  Cheri Webster (2024)   Next INR check:  2024   Priority:  Routine   Target end date:  Indefinite    Indications    S/P AVR (aortic valve replacement) [Z95.2]                 Anticoagulation Episode Summary       INR check location:      Preferred lab:      Send INR reminders to:      Comments:  On-X Aortic Valve Replacement - lower goal to 1.5 to 2.0 around 24          Anticoagulation Care Providers       Provider Role Specialty Phone number    [facility]   [phone]          Refer to Patient Findings for HPI:  Patient Findings       Negatives:  Signs/symptoms of thrombosis, Signs/symptoms of bleeding, Laboratory test error suspected, Change in health, Change in alcohol use, Change in activity, Upcoming invasive procedure, Emergency department visit, Upcoming dental procedure, Missed doses, Extra doses, Change in medications, Change in diet/appetite, Hospital admission, Bruising, Other complaints          Vitals:    24 1025   BP: (!) 145/85   Pulse: 62       Patient seen in clinic today for follow up on anticoagulation therapy with warfarin (a high risk medication) for hx of AVR  Verified current warfarin dosing schedule.  Patient denies any missed doses of warfarin.    Medications reconciled   Pt is on antiplatelet therapy with ASA for AVR.       A/P   INR is therapeutic today at 2.2.     Warfarin dosing recommendation: Continue with the current dosing regimen.   Patient will follow up again in 2 weeks.     Pt educated to contact our clinic with any changes in medications or s/s of bleeding or thrombosis. Pt is aware to seek immediate medical attention for falls, head injury or deep  cuts.    Follow up appointment in 2 week(s).    Next appt: Tues, May 21 @ 9:45am     Ishan Webster PharmD

## 2024-05-21 ENCOUNTER — TELEPHONE (OUTPATIENT)
Dept: CARDIOLOGY | Facility: MEDICAL CENTER | Age: 34
End: 2024-05-21

## 2024-05-21 ENCOUNTER — ANTICOAGULATION VISIT (OUTPATIENT)
Dept: VASCULAR LAB | Facility: MEDICAL CENTER | Age: 34
End: 2024-05-21
Attending: INTERNAL MEDICINE
Payer: COMMERCIAL

## 2024-05-21 DIAGNOSIS — Z95.2 S/P AVR (AORTIC VALVE REPLACEMENT): ICD-10-CM

## 2024-05-21 DIAGNOSIS — R00.2 PALPITATIONS: ICD-10-CM

## 2024-05-21 LAB — INR PPP: 2.7 (ref 2–3.5)

## 2024-05-21 NOTE — TELEPHONE ENCOUNTER
----- Message from CHAKA Umanzor sent at 5/21/2024  1:45 PM PDT -----  Please get two week monitor   ----- Message -----  From: Emma Bonds PharmD  Sent: 5/21/2024   1:36 PM PDT  To: CHAKA Umanzor    Ok, sounds good!  Let us know when it will be ok to reduce his INR goal.  We will keep him at 2.0-3.0 for now.    ----- Message -----  From: CHAKA Umanzor  Sent: 5/21/2024   1:21 PM PDT  To: Emma Bonds PharmD          ----- Message -----  From: Emma Bonds PharmD  Sent: 5/21/2024  11:37 AM PDT  To: CHAKA Umanzor    Hi Wilmer!  Pt is approaching the 3 month tammy post On X AVR (after 05/27/2024). Are you ok with reducing his INR goal to 1.5-2.0 at that time?  Thanks!  Emma Bonds PharmD

## 2024-05-21 NOTE — PROGRESS NOTES
Id like to get a 2 week monitor due to post operative AF first, but am not against reducing it if monitor is good

## 2024-05-21 NOTE — PROGRESS NOTES
Anticoagulation Summary  As of 2024      INR goal:  2.0-3.0   TTR:  65.6% (2.2 mo)   INR used for dosin.70 (2024)   Warfarin maintenance plan:  15 mg (10 mg x 1.5) every Mon, Fri; 10 mg (10 mg x 1) all other days   Weekly warfarin total:  80 mg   No change documented:  Emma Bonds, PharmD   Plan last modified:  Cheri Webster (2024)   Next INR check:  2024   Priority:  Routine   Target end date:  Indefinite    Indications    S/P AVR (aortic valve replacement) [Z95.2]                 Anticoagulation Episode Summary       INR check location:      Preferred lab:      Send INR reminders to:      Comments:  On-X Aortic Valve Replacement - lower goal to 1.5 to 2.0 around 24          Anticoagulation Care Providers       Provider Role Specialty Phone number    [facility]   [phone]                  Refer to Patient Findings for HPI:  Patient Findings       Negatives:  Signs/symptoms of thrombosis, Signs/symptoms of bleeding, Laboratory test error suspected, Change in health, Change in alcohol use, Change in activity, Upcoming invasive procedure, Emergency department visit, Upcoming dental procedure, Missed doses, Extra doses, Change in medications, Change in diet/appetite, Hospital admission, Bruising, Other complaints            There were no vitals filed for this visit.    Verified current warfarin dosing schedule.    Medications reconciled: No  Pt is on antiplatelet therapy with Aspirin for On X AVR.      A/P   INR is therapeutic    Warfarin dosing recommendation: Continue regimen as listed above.    Pt educated to contact our clinic with any changes in medications or s/s of bleeding or thrombosis. Pt is aware to seek immediate medical attention for falls, head injury or deep cuts.    Request pt to return in 2 week(s). Pt agrees.    Sent message to [facility] cardiology MICHEL URIARTE if they are ok with reducing INR goal to 1.5-2.0 after 2024.    Karyna  Titi, Pharmacy Student     Emma Bonds, PharmD    [facility] MICHEL Giraldo APRN    ADDENDUM 5/21/24 6828: Cardiology would like to monitor for AF prior to reducing INR goal. Will keep pt at INR goal 2.0-3.0 for now. Emma Bonds, PharmD

## 2024-05-21 NOTE — Clinical Note
Charles Guillen! Pt is approaching the 3 month tammy post On X AVR (after 05/27/2024). Are you ok with reducing his INR goal to 1.5-2.0 at that time? Thanks! Emma Bonds, PharmD

## 2024-05-22 ENCOUNTER — NON-PROVIDER VISIT (OUTPATIENT)
Dept: CARDIOLOGY | Facility: MEDICAL CENTER | Age: 34
End: 2024-05-22
Attending: FAMILY MEDICINE
Payer: COMMERCIAL

## 2024-05-22 DIAGNOSIS — R00.2 PALPITATIONS: ICD-10-CM

## 2024-05-22 NOTE — PROGRESS NOTES
Patient enrolled in the 14 day o Holter monitoring program per CHAPITO Roman.  >Office hook-up, serial # [id number].  >Currently pending EOS.

## 2024-05-25 DIAGNOSIS — Z95.2 S/P AVR (AORTIC VALVE REPLACEMENT): ICD-10-CM

## 2024-05-29 DIAGNOSIS — Z95.2 S/P AVR (AORTIC VALVE REPLACEMENT): ICD-10-CM

## 2024-05-29 NOTE — TELEPHONE ENCOUNTER
MT    Received request via: Patient    Was the patient seen in the last year in this department? Yes    Does the patient have an active prescription (recently filled or refills available) for medication(s) requested? Yes.     Pharmacy Name: [id number] - [place] - [address] [facility] [[id number]]     Does the patient have USP Plus and need 100 day supply (blood pressure, diabetes and cholesterol meds only)? Patient does not have SCP    Amount Remainin days     Thank you,  Jelly OJEDA

## 2024-06-04 ENCOUNTER — ANTICOAGULATION VISIT (OUTPATIENT)
Dept: VASCULAR LAB | Facility: MEDICAL CENTER | Age: 34
End: 2024-06-04
Attending: INTERNAL MEDICINE
Payer: COMMERCIAL

## 2024-06-04 DIAGNOSIS — Z95.2 S/P AVR (AORTIC VALVE REPLACEMENT): ICD-10-CM

## 2024-06-04 LAB — INR PPP: 3.7 (ref 2–3.5)

## 2024-06-04 PROCEDURE — 99212 OFFICE O/P EST SF 10 MIN: CPT

## 2024-06-04 PROCEDURE — 85610 PROTHROMBIN TIME: CPT

## 2024-06-04 NOTE — PROGRESS NOTES
Anticoagulation Summary  As of 6/4/2024      INR goal:  2.0-3.0   TTR:  59.4% (2.7 mo)   INR used for dosing:  3.70 (6/4/2024)   Warfarin maintenance plan:  15 mg (10 mg x 1.5) every Mon; 10 mg (10 mg x 1) all other days   Weekly warfarin total:  75 mg   Plan last modified:  Cheri Webster (6/4/2024)   Next INR check:  6/11/2024   Priority:  Routine   Target end date:  Indefinite    Indications    S/P AVR (aortic valve replacement) [Z95.2]                 Anticoagulation Episode Summary       INR check location:      Preferred lab:      Send INR reminders to:      Comments:  On-X Aortic Valve Replacement - lower goal to 1.5 to 2.0 around 5/27/24          Anticoagulation Care Providers       Provider Role Specialty Phone number    [facility]   [phone]          Refer to Patient Findings for HPI:    There were no vitals filed for this visit.  The pt declined vitals today     Patient seen in clinic today for follow up on anticoagulation therapy with warfarin (a high risk medication) for hx of AVR  Verified current warfarin dosing schedule.  Patient denies any missed doses of warfarin.    Medications reconciled   Pt is on antiplatelet therapy with Aspirin for On X AVR.         A/P   INR is SUPRA-therapeutic today at 3.7.     Warfarin dosing recommendation: instructed to HOLD warfarin dose TONIGHT ONLY, then to begin reduced weekly regimen to 15mg on Mon and 10mg ROW.     Patient has reached the 3 mo tammy date to have goal INR range lowered down. Message sent to cards last visit. Patient to complete 2 week Zio patch first and they will inform us when it is ok to reduce goal range down to 1.5-2.0. For now continue with goal range of 2-3.    Pt educated to contact our clinic with any changes in medications or s/s of bleeding or thrombosis. Pt is aware to seek immediate medical attention for falls, head injury or deep cuts.    Follow up appointment in 1 week(s).    Next appt: Tues, June 11  @ 7:30am     Ishan Webster PharmD

## 2024-06-09 ENCOUNTER — PATIENT MESSAGE (OUTPATIENT)
Dept: VASCULAR LAB | Facility: MEDICAL CENTER | Age: 34
End: 2024-06-09
Payer: COMMERCIAL

## 2024-06-10 ENCOUNTER — TELEPHONE (OUTPATIENT)
Dept: CARDIOLOGY | Facility: MEDICAL CENTER | Age: 34
End: 2024-06-10
Payer: COMMERCIAL

## 2024-06-10 NOTE — TELEPHONE ENCOUNTER
ARABELLA EOS to MT's nurse, Susu, on 6/10/2024  Preliminary findings:  Sinus rhythm,  with an avg rate of 72 bpm  No patient events recorded

## 2024-06-11 ENCOUNTER — ANTICOAGULATION VISIT (OUTPATIENT)
Dept: VASCULAR LAB | Facility: MEDICAL CENTER | Age: 34
End: 2024-06-11
Attending: INTERNAL MEDICINE
Payer: COMMERCIAL

## 2024-06-11 VITALS — SYSTOLIC BLOOD PRESSURE: 133 MMHG | HEART RATE: 69 BPM | DIASTOLIC BLOOD PRESSURE: 87 MMHG

## 2024-06-11 DIAGNOSIS — Z95.2 S/P AVR (AORTIC VALVE REPLACEMENT): ICD-10-CM

## 2024-06-11 LAB — INR PPP: 3 (ref 2–3.5)

## 2024-06-11 PROCEDURE — 85610 PROTHROMBIN TIME: CPT

## 2024-06-11 PROCEDURE — 99212 OFFICE O/P EST SF 10 MIN: CPT

## 2024-06-11 NOTE — PROGRESS NOTES
Anticoagulation Summary  As of 6/11/2024      INR goal:  2.0-3.0   TTR:  54.7% (2.9 mo)   INR used for dosing:  3.00 (6/11/2024)   Warfarin maintenance plan:  10 mg (10 mg x 1) every day   Weekly warfarin total:  70 mg   Plan last modified:  Jensen Kaur, PharmD (6/11/2024)   Next INR check:  6/25/2024   Priority:  Routine   Target end date:  Indefinite    Indications    S/P AVR (aortic valve replacement) [Z95.2]                 Anticoagulation Episode Summary       INR check location:      Preferred lab:      Send INR reminders to:      Comments:  On-X Aortic Valve Replacement - lower goal to 1.5 to 2.0 around 5/27/24          Anticoagulation Care Providers       Provider Role Specialty Phone number    [facility]   [phone]                  Refer to Patient Findings for HPI:      Vitals:    06/11/24 0740   BP: 133/87   Pulse: 69       Verified current warfarin dosing schedule.    Medications reconciled: Yes  Pt on 81 mg ASA for mech valve.       A/P   INR is therapeutic    Warfarin dosing recommendation: INR is trending high, will begin reduced regtimen.     Awaiting results from cardiology (zio patch) before lowering goal INR.     Pt educated to contact our clinic with any changes in medications or s/s of bleeding or thrombosis. Pt is aware to seek immediate medical attention for falls, head injury or deep cuts.    Request pt to return in 2 week(s). Pt agrees.    Jensen Kaur, PharmD

## 2024-06-25 ENCOUNTER — ANTICOAGULATION VISIT (OUTPATIENT)
Dept: VASCULAR LAB | Facility: MEDICAL CENTER | Age: 34
End: 2024-06-25
Attending: INTERNAL MEDICINE
Payer: COMMERCIAL

## 2024-06-25 VITALS — DIASTOLIC BLOOD PRESSURE: 85 MMHG | SYSTOLIC BLOOD PRESSURE: 153 MMHG | HEART RATE: 67 BPM

## 2024-06-25 DIAGNOSIS — Z95.2 S/P AVR (AORTIC VALVE REPLACEMENT): ICD-10-CM

## 2024-06-25 LAB — INR PPP: 2.3 (ref 2–3.5)

## 2024-06-25 PROCEDURE — 99211 OFF/OP EST MAY X REQ PHY/QHP: CPT

## 2024-06-25 PROCEDURE — 85610 PROTHROMBIN TIME: CPT

## 2024-06-25 NOTE — PROGRESS NOTES
Anticoagulation Summary  As of 2024      INR goal:  2.0-3.0   TTR:  60.9% (3.4 mo)   INR used for dosin.30 (2024)   Warfarin maintenance plan:  10 mg (10 mg x 1) every day   Weekly warfarin total:  70 mg   Plan last modified:  Jensen Kaur, PharmD (2024)   Next INR check:  7/15/2024   Priority:  Routine   Target end date:  Indefinite    Indications    S/P AVR (aortic valve replacement) [Z95.2]                 Anticoagulation Episode Summary       INR check location:      Preferred lab:      Send INR reminders to:      Comments:  On-X Aortic Valve Replacement - lower goal to 1.5 to 2.0 around 24          Anticoagulation Care Providers       Provider Role Specialty Phone number    [facility]   [phone]          Refer to Patient Findings for HPI:  Patient Findings       Negatives:  Signs/symptoms of thrombosis, Signs/symptoms of bleeding, Laboratory test error suspected, Change in health, Change in alcohol use, Change in activity, Upcoming invasive procedure, Emergency department visit, Upcoming dental procedure, Missed doses, Extra doses, Change in medications, Change in diet/appetite, Hospital admission, Bruising, Other complaints          Vitals:    24 0753   BP: (!) 153/85   Pulse: 67     Patient reports normal readings at home. Will CTM     Patient seen in clinic today for follow up on anticoagulation therapy with warfarin (a high risk medication) for hx of AVR  Verified current warfarin dosing schedule.  Patient denies any missed doses of warfarin.    Medications reconciled   Pt on 81 mg ASA for mech valve       A/P   INR is therapeutic today at 2.3.     Warfarin dosing recommendation: Continue with the current dosing regimen.   Patient will follow up again in 3 weeks.     Awaiting results from cardiology (zio patch) before lowering goal INR.     Pt educated to contact our clinic with any changes in medications or s/s of bleeding or thrombosis. Pt is aware  to seek immediate medical attention for falls, head injury or deep cuts.    Follow up appointment in 3 week(s).    Next appt: Mon July 15 @ 7:30am     Ishan Webster PharmD

## 2024-06-25 NOTE — Clinical Note
Desire Guillen,  Patient reports he has completed the ZIO patch and was wondering about the results.  Please let us know when it is appropriate to lower his INR range. Thank you so much. [facility] [place] [facility]  [phone]

## 2024-07-15 ENCOUNTER — ANTICOAGULATION VISIT (OUTPATIENT)
Dept: VASCULAR LAB | Facility: MEDICAL CENTER | Age: 34
End: 2024-07-15
Attending: INTERNAL MEDICINE
Payer: COMMERCIAL

## 2024-07-15 VITALS — DIASTOLIC BLOOD PRESSURE: 87 MMHG | SYSTOLIC BLOOD PRESSURE: 158 MMHG | HEART RATE: 59 BPM

## 2024-07-15 DIAGNOSIS — Z95.2 S/P AVR (AORTIC VALVE REPLACEMENT): ICD-10-CM

## 2024-07-15 LAB — INR PPP: 1.9 (ref 2–3.5)

## 2024-07-15 PROCEDURE — 85610 PROTHROMBIN TIME: CPT

## 2024-07-15 PROCEDURE — 99211 OFF/OP EST MAY X REQ PHY/QHP: CPT

## 2024-08-12 ENCOUNTER — ANTICOAGULATION VISIT (OUTPATIENT)
Dept: VASCULAR LAB | Facility: MEDICAL CENTER | Age: 34
End: 2024-08-12
Attending: INTERNAL MEDICINE
Payer: COMMERCIAL

## 2024-08-12 VITALS — SYSTOLIC BLOOD PRESSURE: 150 MMHG | HEART RATE: 70 BPM | DIASTOLIC BLOOD PRESSURE: 90 MMHG

## 2024-08-12 DIAGNOSIS — Z95.2 S/P AVR (AORTIC VALVE REPLACEMENT): ICD-10-CM

## 2024-08-12 LAB — INR PPP: 2.1 (ref 2–3.5)

## 2024-08-12 PROCEDURE — 99212 OFFICE O/P EST SF 10 MIN: CPT

## 2024-08-12 PROCEDURE — 85610 PROTHROMBIN TIME: CPT

## 2024-08-12 NOTE — PROGRESS NOTES
Anticoagulation Summary  As of 2024      INR goal:  1.5-2.0   TTR:  60.8% (5 mo)   INR used for dosin.10 (2024)   Warfarin maintenance plan:  10 mg (10 mg x 1) every day   Weekly warfarin total:  70 mg   Plan last modified:  Jensen Kaur, PharmD (2024)   Next INR check:  2024   Priority:  Routine   Target end date:  Indefinite    Indications    S/P AVR (aortic valve replacement) [Z95.2]                 Anticoagulation Episode Summary       INR check location:      Preferred lab:      Send INR reminders to:      Comments:  On-X Aortic Valve Replacement - lower goal to 1.5 to 2.0 around 24 (MD authorized lower INR range see note 24)          Anticoagulation Care Providers       Provider Role Specialty Phone number    [facility]   [phone]                  Refer to Patient Findings for HPI:  Patient Findings       Negatives:  Signs/symptoms of thrombosis, Signs/symptoms of bleeding, Laboratory test error suspected, Change in health, Change in alcohol use, Change in activity, Upcoming invasive procedure, Emergency department visit, Upcoming dental procedure, Missed doses, Extra doses, Change in medications, Change in diet/appetite, Hospital admission, Bruising, Other complaints            Vitals:    24 0752   BP: (!) 150/90   Pulse: 70     Reports normal ranges SBP ~120 at home. Will continue monitoring.     Verified current warfarin dosing schedule.    Medications reconciled: No  Pt is on antiplatelet therapy with aspirin for On-X valve.    A/P   INR is slightly supratherapeutic    Warfarin dosing recommendation: Continue regimen as listed above. Barely supratherapeutic. Recommend picking up vegetables with vitamin K.     Pt educated to contact our clinic with any changes in medications or s/s of bleeding or thrombosis. Pt is aware to seek immediate medical attention for falls, head injury or deep cuts.    Request pt to return in 3 week(s). Pt  agrees.    Visit completed with Emma Bonds, PharmD.  Lidia Argueta, AriellaD

## 2024-09-03 ENCOUNTER — ANTICOAGULATION VISIT (OUTPATIENT)
Dept: VASCULAR LAB | Facility: MEDICAL CENTER | Age: 34
End: 2024-09-03
Attending: INTERNAL MEDICINE
Payer: COMMERCIAL

## 2024-09-03 VITALS — SYSTOLIC BLOOD PRESSURE: 140 MMHG | DIASTOLIC BLOOD PRESSURE: 96 MMHG | HEART RATE: 80 BPM

## 2024-09-03 DIAGNOSIS — Z95.2 S/P AVR (AORTIC VALVE REPLACEMENT): ICD-10-CM

## 2024-09-03 LAB — INR PPP: 1.2 (ref 2–3.5)

## 2024-09-03 PROCEDURE — 85610 PROTHROMBIN TIME: CPT

## 2024-09-03 PROCEDURE — 99212 OFFICE O/P EST SF 10 MIN: CPT

## 2024-09-03 NOTE — PROGRESS NOTES
Anticoagulation Summary  As of 9/3/2024      INR goal:  1.5-2.0   TTR:  60.1% (5.7 mo)   INR used for dosin.20 (9/3/2024)   Warfarin maintenance plan:  10 mg (10 mg x 1) every day   Weekly warfarin total:  70 mg   Plan last modified:  Jensen Kaur, PharmD (2024)   Next INR check:  2024   Priority:  Routine   Target end date:  Indefinite    Indications    S/P AVR (aortic valve replacement) [Z95.2]                 Anticoagulation Episode Summary       INR check location:  --    Preferred lab:  --    Send INR reminders to:  --    Comments:  On-X Aortic Valve Replacement - lower goal to 1.5 to 2.0 around 24 (MD authorized lower INR range see note 24)          Anticoagulation Care Providers       Provider Role Specialty Phone number    [facility]   [phone]                  Refer to Patient Findings for HPI:  Patient Findings       Positives:  Missed doses    Negatives:  Signs/symptoms of thrombosis, Signs/symptoms of bleeding, Laboratory test error suspected, Change in health, Change in alcohol use, Change in activity, Upcoming invasive procedure, Emergency department visit, Upcoming dental procedure, Extra doses, Change in medications, Change in diet/appetite, Hospital admission, Bruising, Other complaints            Vitals:    24 0750   BP: (!) 140/96   Pulse: 80   BP elevated in office - pt states his BP at home is normally at goal     Verified current warfarin dosing schedule.    Medications reconciled.  Pt is on antiplatelet therapy with ASA for AVR.      A/P   INR is subtherapeutic  Reason(s) for out of range INR today: Missed dose(s)      Warfarin dosing recommendation: Bolus with 15mg x1 day, then Continue regimen as listed above.    Pt educated to contact our clinic with any changes in medications or s/s of bleeding or thrombosis. Pt is aware to seek immediate medical attention for falls, head injury or deep cuts.    Request pt to return in 2 week(s). Pt  agrees.    Emma Bonds, PharmD

## 2024-09-17 ENCOUNTER — ANTICOAGULATION VISIT (OUTPATIENT)
Dept: VASCULAR LAB | Facility: MEDICAL CENTER | Age: 34
End: 2024-09-17
Attending: INTERNAL MEDICINE
Payer: COMMERCIAL

## 2024-09-17 DIAGNOSIS — Z95.2 S/P AVR (AORTIC VALVE REPLACEMENT): ICD-10-CM

## 2024-09-17 LAB — INR PPP: 3.2 (ref 2–3.5)

## 2024-09-17 PROCEDURE — 85610 PROTHROMBIN TIME: CPT

## 2024-09-17 PROCEDURE — 99212 OFFICE O/P EST SF 10 MIN: CPT

## 2024-09-17 NOTE — PROGRESS NOTES
Anticoagulation Summary  As of 9/17/2024      INR goal:  1.5-2.0   TTR:  57.4% (6.2 mo)   INR used for dosing:  3.20 (9/17/2024)   Warfarin maintenance plan:  10 mg (10 mg x 1) every day   Weekly warfarin total:  70 mg   Plan last modified:  Jensen Kaur, PharmD (6/11/2024)   Next INR check:  9/26/2024   Priority:  Routine   Target end date:  Indefinite    Indications    S/P AVR (aortic valve replacement) [Z95.2]                 Anticoagulation Episode Summary       INR check location:  --    Preferred lab:  --    Send INR reminders to:  --    Comments:  On-X Aortic Valve Replacement - lower goal to 1.5 to 2.0 around 5/27/24 (MD authorized lower INR range see note 6/25/24)          Anticoagulation Care Providers       Provider Role Specialty Phone number    [facility]   [phone]                  Refer to Patient Findings for HPI:  Patient Findings       Positives:  Change in health (had COVID infection 2 weeks ago. Recovered last week.)    Negatives:  Signs/symptoms of thrombosis, Signs/symptoms of bleeding, Laboratory test error suspected, Change in alcohol use, Change in activity, Upcoming invasive procedure, Emergency department visit, Upcoming dental procedure, Missed doses, Extra doses, Change in medications, Change in diet/appetite, Hospital admission, Bruising, Other complaints            There were no vitals filed for this visit.  Verified current warfarin dosing schedule.    Medications reconciled.  Pt is on antiplatelet therapy with ASA for AVR.      A/P   INR is supratherapeutic  Reason(s) for out of range INR today:  Possibly COVID infection       Warfarin dosing recommendation: Take 5mg today, then Continue regimen as listed above.    Pt educated to contact our clinic with any changes in medications or s/s of bleeding or thrombosis. Pt is aware to seek immediate medical attention for falls, head injury or deep cuts.    Request pt to return in 1 week(s). Pt agrees.    Emma LAYNE  Vamshi, PharmD

## 2024-09-26 ENCOUNTER — ANTICOAGULATION VISIT (OUTPATIENT)
Dept: VASCULAR LAB | Facility: MEDICAL CENTER | Age: 34
End: 2024-09-26
Attending: INTERNAL MEDICINE
Payer: COMMERCIAL

## 2024-09-26 ENCOUNTER — APPOINTMENT (OUTPATIENT)
Dept: CARDIOLOGY | Facility: MEDICAL CENTER | Age: 34
End: 2024-09-26
Attending: NURSE PRACTITIONER
Payer: COMMERCIAL

## 2024-09-26 DIAGNOSIS — Z95.2 S/P AVR (AORTIC VALVE REPLACEMENT): ICD-10-CM

## 2024-09-26 LAB — INR PPP: 2.7 (ref 2–3.5)

## 2024-09-26 PROCEDURE — 85610 PROTHROMBIN TIME: CPT

## 2024-09-26 PROCEDURE — 99212 OFFICE O/P EST SF 10 MIN: CPT

## 2024-09-26 NOTE — PROGRESS NOTES
Anticoagulation Summary  As of 2024      INR goal:  1.5-2.0   TTR:  54.8% (6.5 mo)   INR used for dosin.70 (2024)   Warfarin maintenance plan:  10 mg (10 mg x 1) every day   Weekly warfarin total:  70 mg   Plan last modified:  Jensen Kaur, PharmD (2024)   Next INR check:  10/1/2024   Priority:  Routine   Target end date:  Indefinite    Indications    S/P AVR (aortic valve replacement) [Z95.2]                 Anticoagulation Episode Summary       INR check location:  --    Preferred lab:  --    Send INR reminders to:  --    Comments:  On-X Aortic Valve Replacement - lower goal to 1.5 to 2.0 around 24 (MD authorized lower INR range see note 24)          Anticoagulation Care Providers       Provider Role Specialty Phone number    [facility]   [phone]                  Refer to Patient Findings for HPI:  Patient Findings       Negatives:  Signs/symptoms of thrombosis, Signs/symptoms of bleeding, Laboratory test error suspected, Change in health, Change in alcohol use, Change in activity, Upcoming invasive procedure, Emergency department visit, Upcoming dental procedure, Missed doses, Extra doses, Change in medications, Change in diet/appetite, Hospital admission, Bruising, Other complaints            There were no vitals filed for this visit.  Pt declined vitals    Verified current warfarin dosing schedule.    Medications reconciled.  Pt is on antiplatelet therapy with ASA for AVR.      A/P   INR is supratherapeutic  Reason(s) for out of range INR today: No obvious causes  - pt recovering from COVID infection as outlined in previous anticoag note.    Warfarin dosing recommendation: Take REDUCED dose of 5mg today AND tomorrow, then resume current warfarin dosing regimen.     Pt educated to contact our clinic with any changes in medications or s/s of bleeding or thrombosis. Pt is aware to seek immediate medical attention for falls, head injury or deep  cuts.    Request pt to return in 1 week(s). Pt agrees.    Lucas Gaines, PharmD

## 2024-10-01 ENCOUNTER — ANTICOAGULATION VISIT (OUTPATIENT)
Dept: VASCULAR LAB | Facility: MEDICAL CENTER | Age: 34
End: 2024-10-01
Attending: INTERNAL MEDICINE
Payer: COMMERCIAL

## 2024-10-01 DIAGNOSIS — Z95.2 S/P AVR (AORTIC VALVE REPLACEMENT): ICD-10-CM

## 2024-10-01 LAB — INR PPP: 1.8 (ref 2–3.5)

## 2024-10-01 PROCEDURE — 99211 OFF/OP EST MAY X REQ PHY/QHP: CPT

## 2024-10-01 PROCEDURE — 85610 PROTHROMBIN TIME: CPT

## 2024-10-01 RX ORDER — METOPROLOL TARTRATE 25 MG/1
25 TABLET, FILM COATED ORAL 2 TIMES DAILY
Qty: 200 TABLET | Refills: 1 | Status: SHIPPED | OUTPATIENT
Start: 2024-10-01

## 2024-10-09 DIAGNOSIS — Z95.2 S/P AVR (AORTIC VALVE REPLACEMENT): ICD-10-CM

## 2024-10-09 RX ORDER — WARFARIN SODIUM 10 MG/1
10 TABLET ORAL DAILY
Qty: 90 TABLET | Refills: 1 | Status: SHIPPED | OUTPATIENT
Start: 2024-10-09

## 2024-10-16 ENCOUNTER — ANTICOAGULATION VISIT (OUTPATIENT)
Dept: VASCULAR LAB | Facility: MEDICAL CENTER | Age: 34
End: 2024-10-16
Attending: INTERNAL MEDICINE
Payer: COMMERCIAL

## 2024-10-16 VITALS — HEART RATE: 65 BPM | DIASTOLIC BLOOD PRESSURE: 76 MMHG | SYSTOLIC BLOOD PRESSURE: 125 MMHG

## 2024-10-16 DIAGNOSIS — Z95.2 S/P AVR (AORTIC VALVE REPLACEMENT): ICD-10-CM

## 2024-10-16 LAB — INR PPP: 2.5 (ref 2–3.5)

## 2024-10-16 PROCEDURE — 99212 OFFICE O/P EST SF 10 MIN: CPT

## 2024-10-16 PROCEDURE — 85610 PROTHROMBIN TIME: CPT

## 2024-10-21 ENCOUNTER — APPOINTMENT (OUTPATIENT)
Dept: VASCULAR LAB | Facility: MEDICAL CENTER | Age: 34
End: 2024-10-21
Payer: COMMERCIAL

## 2024-10-21 ENCOUNTER — OFFICE VISIT (OUTPATIENT)
Dept: VASCULAR LAB | Facility: MEDICAL CENTER | Age: 34
End: 2024-10-21
Attending: NURSE PRACTITIONER
Payer: COMMERCIAL

## 2024-10-21 ENCOUNTER — OFFICE VISIT (OUTPATIENT)
Dept: CARDIOLOGY | Facility: MEDICAL CENTER | Age: 34
End: 2024-10-21
Attending: NURSE PRACTITIONER
Payer: COMMERCIAL

## 2024-10-21 VITALS
WEIGHT: 210 LBS | SYSTOLIC BLOOD PRESSURE: 122 MMHG | HEIGHT: 71 IN | HEART RATE: 55 BPM | BODY MASS INDEX: 29.4 KG/M2 | DIASTOLIC BLOOD PRESSURE: 78 MMHG

## 2024-10-21 VITALS
OXYGEN SATURATION: 97 % | BODY MASS INDEX: 30.06 KG/M2 | DIASTOLIC BLOOD PRESSURE: 82 MMHG | WEIGHT: 210 LBS | HEIGHT: 70 IN | RESPIRATION RATE: 16 BRPM | HEART RATE: 69 BPM | SYSTOLIC BLOOD PRESSURE: 130 MMHG

## 2024-10-21 DIAGNOSIS — Z95.2 S/P AVR (AORTIC VALVE REPLACEMENT): ICD-10-CM

## 2024-10-21 DIAGNOSIS — Z95.2 ENCOUNTER FOR FOLLOW-UP FOR AORTIC VALVE REPLACEMENT: ICD-10-CM

## 2024-10-21 DIAGNOSIS — Z09 ENCOUNTER FOR FOLLOW-UP FOR AORTIC VALVE REPLACEMENT: ICD-10-CM

## 2024-10-21 DIAGNOSIS — I10 PRIMARY HYPERTENSION: ICD-10-CM

## 2024-10-21 DIAGNOSIS — D68.59 HYPERCOAGULABLE STATE (HCC): ICD-10-CM

## 2024-10-21 DIAGNOSIS — I77.810 DILATED AORTIC ROOT (HCC): ICD-10-CM

## 2024-10-21 PROBLEM — Z79.899 ENCOUNTER FOR MONITORING AMIODARONE THERAPY: Status: RESOLVED | Noted: 2024-03-26 | Resolved: 2024-10-21

## 2024-10-21 PROBLEM — Z51.81 ENCOUNTER FOR MONITORING AMIODARONE THERAPY: Status: RESOLVED | Noted: 2024-03-26 | Resolved: 2024-10-21

## 2024-10-21 LAB — EKG IMPRESSION: NORMAL

## 2024-10-21 PROCEDURE — 3078F DIAST BP <80 MM HG: CPT | Performed by: NURSE PRACTITIONER

## 2024-10-21 PROCEDURE — 99213 OFFICE O/P EST LOW 20 MIN: CPT | Performed by: NURSE PRACTITIONER

## 2024-10-21 PROCEDURE — 3074F SYST BP LT 130 MM HG: CPT | Performed by: NURSE PRACTITIONER

## 2024-10-21 PROCEDURE — 93005 ELECTROCARDIOGRAM TRACING: CPT | Performed by: NURSE PRACTITIONER

## 2024-10-21 PROCEDURE — 93010 ELECTROCARDIOGRAM REPORT: CPT | Performed by: INTERNAL MEDICINE

## 2024-10-21 PROCEDURE — 99211 OFF/OP EST MAY X REQ PHY/QHP: CPT | Performed by: NURSE PRACTITIONER

## 2024-10-21 PROCEDURE — 3075F SYST BP GE 130 - 139MM HG: CPT | Performed by: NURSE PRACTITIONER

## 2024-10-21 PROCEDURE — 99214 OFFICE O/P EST MOD 30 MIN: CPT | Performed by: NURSE PRACTITIONER

## 2024-10-21 PROCEDURE — 3079F DIAST BP 80-89 MM HG: CPT | Performed by: NURSE PRACTITIONER

## 2024-10-21 PROCEDURE — 99212 OFFICE O/P EST SF 10 MIN: CPT

## 2024-10-21 ASSESSMENT — ENCOUNTER SYMPTOMS
WHEEZING: 0
FOCAL WEAKNESS: 0
NAUSEA: 0
CONSTITUTIONAL NEGATIVE: 1
SHORTNESS OF BREATH: 0
DEPRESSION: 0
ORTHOPNEA: 0
RESPIRATORY NEGATIVE: 1
SPEECH CHANGE: 0
NEUROLOGICAL NEGATIVE: 1
MUSCULOSKELETAL NEGATIVE: 1
CLAUDICATION: 0
GASTROINTESTINAL NEGATIVE: 1
NERVOUS/ANXIOUS: 0
DIZZINESS: 0
BLOOD IN STOOL: 0
SENSORY CHANGE: 0
HALLUCINATIONS: 0
BRUISES/BLEEDS EASILY: 0
SHORTNESS OF BREATH: 0
PND: 0
PALPITATIONS: 0
EYES NEGATIVE: 1
PALPITATIONS: 0

## 2024-10-21 ASSESSMENT — FIBROSIS 4 INDEX
FIB4 SCORE: 0.34
FIB4 SCORE: 0.34

## 2024-10-24 ENCOUNTER — ANTICOAGULATION VISIT (OUTPATIENT)
Dept: VASCULAR LAB | Facility: MEDICAL CENTER | Age: 34
End: 2024-10-24
Attending: INTERNAL MEDICINE
Payer: COMMERCIAL

## 2024-10-24 DIAGNOSIS — Z95.2 S/P AVR (AORTIC VALVE REPLACEMENT): ICD-10-CM

## 2024-10-24 LAB — INR PPP: 1.7 (ref 2–3.5)

## 2024-10-24 PROCEDURE — 85610 PROTHROMBIN TIME: CPT

## 2024-10-24 PROCEDURE — 99211 OFF/OP EST MAY X REQ PHY/QHP: CPT

## 2024-11-01 DIAGNOSIS — I77.819 AORTIC ECTASIA (HCC): ICD-10-CM

## 2024-11-01 DIAGNOSIS — Z95.2 S/P AVR (AORTIC VALVE REPLACEMENT): ICD-10-CM

## 2024-11-01 NOTE — PROGRESS NOTES
Studies to Be Obtained: Echocardiogram February 2025     Orders placed for vascular surveillance imaging.    Healthcare IT message sent to pt to remind that they are due for their surveillance vascular imaging.       Ирина Gordon R.N.   [facility]

## 2024-11-07 ENCOUNTER — ANTICOAGULATION VISIT (OUTPATIENT)
Dept: VASCULAR LAB | Facility: MEDICAL CENTER | Age: 34
End: 2024-11-07
Attending: INTERNAL MEDICINE
Payer: COMMERCIAL

## 2024-11-07 DIAGNOSIS — Z95.2 S/P AVR (AORTIC VALVE REPLACEMENT): ICD-10-CM

## 2024-11-07 LAB — INR PPP: 1.4 (ref 2–3.5)

## 2024-11-07 PROCEDURE — 85610 PROTHROMBIN TIME: CPT

## 2024-11-07 PROCEDURE — 99212 OFFICE O/P EST SF 10 MIN: CPT | Performed by: NURSE PRACTITIONER

## 2024-11-07 NOTE — PROGRESS NOTES
Anticoagulation Summary  As of 2024      INR goal:  1.5-2.0   TTR:  52.6% (7.9 mo)   INR used for dosin.40 (2024)   Warfarin maintenance plan:  5 mg (10 mg x 0.5) every Tue, Thu, Sat; 10 mg (10 mg x 1) all other days   Weekly warfarin total:  55 mg   Plan last modified:  Tricia Walter A.P.NAlysha (2024)   Next INR check:  2024   Priority:  Routine   Target end date:  Indefinite    Indications    S/P AVR (aortic valve replacement) [Z95.2]                 Anticoagulation Episode Summary       INR check location:  --    Preferred lab:  --    Send INR reminders to:  --    Comments:  On-X Aortic Valve Replacement - lower goal to 1.5 to 2.0 around 24 (MD authorized lower INR range see note 24)          Anticoagulation Care Providers       Provider Role Specialty Phone number    [facility]   [phone]                  Refer to Patient Findings for HPI:  Patient Findings       Negatives:  Signs/symptoms of thrombosis, Signs/symptoms of bleeding, Laboratory test error suspected, Change in health, Change in alcohol use, Change in activity, Upcoming invasive procedure, Emergency department visit, Upcoming dental procedure, Missed doses, Extra doses, Change in medications, Change in diet/appetite, Hospital admission, Bruising, Other complaints    Comments:  He has been losing a little weight but is staying consistent with his vit k intake.            There were no vitals filed for this visit.  Pt declined vitals today.    Verified current warfarin dosing schedule.    Medications reconciled.  Pt is on antiplatelet therapy with ASA 81 mg for [facility] AVR.    Pt only uses 10 mg warfarin tablets (not 5 mg tablets also).    A/P   INR is slightly subtherapeutic today at 1.5. INR last visit was 1.7. Will shared decision making will have pt take a one time dose increase.  Reason(s) for out of range INR today: No obvious causes      Warfarin dosing recommendation: Take 10 mg tonight,  then resume your previous regimen as outlined above.    Pt educated to contact our clinic with any changes in medications or s/s of bleeding or thrombosis. Pt is aware to seek immediate medical attention for falls, head injury or deep cuts.    Request pt to return in 2 week(s). Pt agrees.    FLAKITO Rendon.

## 2024-11-21 ENCOUNTER — ANTICOAGULATION VISIT (OUTPATIENT)
Dept: VASCULAR LAB | Facility: MEDICAL CENTER | Age: 34
End: 2024-11-21
Attending: INTERNAL MEDICINE
Payer: COMMERCIAL

## 2024-11-21 VITALS — SYSTOLIC BLOOD PRESSURE: 120 MMHG | HEART RATE: 53 BPM | DIASTOLIC BLOOD PRESSURE: 73 MMHG

## 2024-11-21 DIAGNOSIS — Z95.2 S/P AVR (AORTIC VALVE REPLACEMENT): ICD-10-CM

## 2024-11-21 LAB — INR PPP: 1.3 (ref 2–3.5)

## 2024-11-21 PROCEDURE — 99212 OFFICE O/P EST SF 10 MIN: CPT | Performed by: NURSE PRACTITIONER

## 2024-11-21 PROCEDURE — 85610 PROTHROMBIN TIME: CPT

## 2024-11-21 NOTE — PROGRESS NOTES
Anticoagulation Summary  As of 2024      INR goal:  1.5-2.0   TTR:  49.6% (8.4 mo)   INR used for dosin.30 (2024)   Warfarin maintenance plan:  5 mg (10 mg x 0.5) every Tue, Sat; 10 mg (10 mg x 1) all other days   Weekly warfarin total:  60 mg   Plan last modified:  Tricia Walter A.P.NAlysha (2024)   Next INR check:  2024   Priority:  Routine   Target end date:  Indefinite    Indications    S/P AVR (aortic valve replacement) [Z95.2]                 Anticoagulation Episode Summary       INR check location:  --    Preferred lab:  --    Send INR reminders to:  --    Comments:  On-X Aortic Valve Replacement - lower goal to 1.5 to 2.0 around 24 (MD authorized lower INR range see note 24)          Anticoagulation Care Providers       Provider Role Specialty Phone number    [facility]   [phone]                  Refer to Patient Findings for HPI:  Patient Findings       Negatives:  Signs/symptoms of thrombosis, Signs/symptoms of bleeding, Laboratory test error suspected, Change in health, Change in alcohol use, Change in activity, Upcoming invasive procedure, Emergency department visit, Upcoming dental procedure, Missed doses, Extra doses, Change in medications, Change in diet/appetite, Hospital admission, Bruising, Other complaints    Comments:  He has been fasting some, exercising and losing weight. No changes with his diet.            Vitals:    24 0742   BP: 120/73   Pulse: (!) 53       Verified current warfarin dosing schedule.    Medications reconciled.  Pt is on [facility]platelet therapy with [facility]SA [facility].      A/P   INR is subtherapeutic today at 1.3. INR low last visit at 1.4 for which he had a one time dose increase. With shared decision-making we will increase his weekly regimen.  Reason(s) for out of range INR today: No obvious causes      Warfarin dosing recommendation: Take 5 mg on /, 10 mg AODs.    Pt educated to contact  our clinic with any changes in medications or s/s of bleeding or thrombosis. Pt is aware to seek immediate medical attention for falls, head injury or deep cuts.    Request pt to return in 2 week(s) as opposed to 1 week given holiday next week and pt's work schedule. Pt agrees.    FLAKITO Rendon.

## 2024-12-05 ENCOUNTER — ANTICOAGULATION VISIT (OUTPATIENT)
Dept: VASCULAR LAB | Facility: MEDICAL CENTER | Age: 34
End: 2024-12-05
Attending: INTERNAL MEDICINE
Payer: COMMERCIAL

## 2024-12-05 DIAGNOSIS — Z95.2 S/P AVR (AORTIC VALVE REPLACEMENT): ICD-10-CM

## 2024-12-05 LAB — INR PPP: 1.4 (ref 2–3.5)

## 2024-12-05 PROCEDURE — 99212 OFFICE O/P EST SF 10 MIN: CPT | Performed by: NURSE PRACTITIONER

## 2024-12-05 PROCEDURE — 85610 PROTHROMBIN TIME: CPT

## 2024-12-05 NOTE — PROGRESS NOTES
Anticoagulation Summary  As of 2024      INR goal:  1.5-2.0   TTR:  47.0% (8.8 mo)   INR used for dosin.40 (2024)   Warfarin maintenance plan:  5 mg (10 mg x 0.5) every Tue; 10 mg (10 mg x 1) all other days   Weekly warfarin total:  65 mg   Plan last modified:  Tricia Walter AAlyshaPAlyshaNAlysha (2024)   Next INR check:  2024   Priority:  Routine   Target end date:  Indefinite    Indications    S/P AVR (aortic valve replacement) [Z95.2]                 Anticoagulation Episode Summary       INR check location:  --    Preferred lab:  --    Send INR reminders to:  --    Comments:  On-X Aortic Valve Replacement - lower goal to 1.5 to 2.0 around 24 (MD authorized lower INR range see note 24)          Anticoagulation Care Providers       Provider Role Specialty Phone number    [facility]   [phone]                  Refer to Patient Findings for HPI:  Patient Findings       Positives:  Change in diet/appetite    Negatives:  Signs/symptoms of thrombosis, Signs/symptoms of bleeding, Laboratory test error suspected, Change in health, Change in alcohol use, Change in activity, Upcoming invasive procedure, Emergency department visit, Upcoming dental procedure, Missed doses, Extra doses, Change in medications, Hospital admission, Bruising, Other complaints    Comments:  He is doing a 12 hour intermittent fast, exercising and reports some weight loss. Denies changing the vit k amount in his diet.             There were no vitals filed for this visit.  Pt declined vitals although I offered.    Verified current warfarin dosing schedule.    Medications reconciled.  Pt is on antiplatelet therapy with [facility]SA [facility].      A/P   INR is slightly subtherapeutic today at 1.4. INR only increased slightly from his last INR of 1.3 despite a dose decrease. Will increase his regimen further.  Reason(s) for out of range INR today: No obvious causes      Warfarin dosing recommendation:  Began taking 5 mg on Tuesdays, 10 mg AODs.    Pt educated to contact our clinic with any changes in medications or s/s of bleeding or thrombosis. Pt is aware to seek immediate medical attention for falls, head injury or deep cuts.    Request pt to return in 1-2 week(s). Pt prefers to return in 2 weeks.    FLKAITO Rendon.

## 2024-12-19 ENCOUNTER — ANTICOAGULATION VISIT (OUTPATIENT)
Dept: VASCULAR LAB | Facility: MEDICAL CENTER | Age: 34
End: 2024-12-19
Attending: INTERNAL MEDICINE
Payer: COMMERCIAL

## 2024-12-19 DIAGNOSIS — Z95.2 S/P AVR (AORTIC VALVE REPLACEMENT): ICD-10-CM

## 2024-12-19 LAB — INR PPP: 1.3 (ref 2–3.5)

## 2024-12-19 PROCEDURE — 99212 OFFICE O/P EST SF 10 MIN: CPT | Performed by: NURSE PRACTITIONER

## 2024-12-19 PROCEDURE — 85610 PROTHROMBIN TIME: CPT

## 2024-12-19 NOTE — PROGRESS NOTES
Anticoagulation Summary  As of 2024      INR goal:  1.5-2.0   TTR:  44.7% (9.3 mo)   INR used for dosin.30 (2024)   Warfarin maintenance plan:  10 mg (10 mg x 1) every day   Weekly warfarin total:  70 mg   Plan last modified:  KERON RendonPKATIE (2024)   Next INR check:  2025   Priority:  Routine   Target end date:  Indefinite    Indications    S/P AVR (aortic valve replacement) [Z95.2]                 Anticoagulation Episode Summary       INR check location:  --    Preferred lab:  --    Send INR reminders to:  --    Comments:  On-X Aortic Valve Replacement - lower goal to 1.5 to 2.0 around 24 (MD authorized lower INR range see note 24)          Anticoagulation Care Providers       Provider Role Specialty Phone number    [facility]   [phone]                  Refer to Patient Findings for HPI:  Patient Findings       Negatives:  Signs/symptoms of thrombosis, Signs/symptoms of bleeding, Laboratory test error suspected, Change in health, Change in alcohol use, Change in activity, Upcoming invasive procedure, Emergency department visit, Upcoming dental procedure, Missed doses, Extra doses, Change in medications, Change in diet/appetite, Hospital admission, Bruising, Other complaints            There were no vitals filed for this visit.  Pt declined vitals although I offered.    Verified current warfarin dosing schedule.    Medications reconciled.  Pt is on antiplatelet therapy with ASA 81 mg for [facility] AVR.      A/P   INR is subtherapeutic today at 1.3. INR continues to trend low despite dose increases. He is sure his vit k intake has not changed nor has he missed doses. Will increase his regimen further.  Reason(s) for out of range INR today: No obvious causes      Warfarin dosing recommendation: Began taking 10 mg daily.    Pt educated to contact our clinic with any changes in medications or s/s of bleeding or thrombosis. Pt is aware to seek immediate  medical attention for falls, head injury or deep cuts.    Request pt to return in 1 week(s). Pt prefers to return in 2 weeks.    FLAKITO Rendon.

## 2025-01-02 ENCOUNTER — ANTICOAGULATION VISIT (OUTPATIENT)
Dept: VASCULAR LAB | Facility: MEDICAL CENTER | Age: 35
End: 2025-01-02
Attending: INTERNAL MEDICINE
Payer: COMMERCIAL

## 2025-01-02 DIAGNOSIS — Z95.2 S/P AVR (AORTIC VALVE REPLACEMENT): ICD-10-CM

## 2025-01-02 LAB — INR PPP: 1.8 (ref 2–3.5)

## 2025-01-02 PROCEDURE — 99211 OFF/OP EST MAY X REQ PHY/QHP: CPT

## 2025-01-02 PROCEDURE — 85610 PROTHROMBIN TIME: CPT

## 2025-01-02 NOTE — PROGRESS NOTES
Anticoagulation Summary  As of 2025      INR goal:  1.5-2.0   TTR:  45.4% (9.8 mo)   INR used for dosin.80 (2025)   Warfarin maintenance plan:  10 mg (10 mg x 1) every day   Weekly warfarin total:  70 mg   Plan last modified:  Tricia Walter A.P.NAlysha (2024)   Next INR check:  2025   Priority:  Routine   Target end date:  Indefinite    Indications    S/P AVR (aortic valve replacement) [Z95.2]                 Anticoagulation Episode Summary       INR check location:  --    Preferred lab:  --    Send INR reminders to:  --    Comments:  On-X Aortic Valve Replacement - lower goal to 1.5 to 2.0 around 24 (MD authorized lower INR range see note 24)          Anticoagulation Care Providers       Provider Role Specialty Phone number    [facility]   [phone]                  Refer to Patient Findings for HPI:  Patient Findings       Negatives:  Signs/symptoms of thrombosis, Signs/symptoms of bleeding, Laboratory test error suspected, Change in health, Change in alcohol use, Change in activity, Upcoming invasive procedure, Emergency department visit, Upcoming dental procedure, Missed doses, Extra doses, Change in medications, Change in diet/appetite, Hospital admission, Bruising, Other complaints            There were no vitals filed for this visit.  Pt declined vitals    Verified current warfarin dosing schedule.    Medications reconciled.  Pt is on antiplatelet therapy with ASA 81 mg for mechanical AVR.      A/P   INR is therapeutic  Reason(s) for out of range INR today: N/A      Warfarin dosing recommendation: Continue regimen as listed above.    Pt educated to contact our clinic with any changes in medications or s/s of bleeding or thrombosis. Pt is aware to seek immediate medical attention for falls, head injury or deep cuts.    Request pt to return in 3 week(s). Pt agrees.    Kishore Hernandes, AriellaD

## 2025-01-03 ENCOUNTER — HOSPITAL ENCOUNTER (OUTPATIENT)
Dept: LAB | Facility: MEDICAL CENTER | Age: 35
End: 2025-01-03
Attending: NURSE PRACTITIONER
Payer: COMMERCIAL

## 2025-01-03 DIAGNOSIS — I10 PRIMARY HYPERTENSION: ICD-10-CM

## 2025-01-03 DIAGNOSIS — D68.59 HYPERCOAGULABLE STATE (HCC): ICD-10-CM

## 2025-01-03 LAB
ALBUMIN SERPL BCP-MCNC: 4.3 G/DL (ref 3.2–4.9)
ALBUMIN/GLOB SERPL: 1.4 G/DL
ALP SERPL-CCNC: 51 U/L (ref 30–99)
ALT SERPL-CCNC: 29 U/L (ref 2–50)
ANION GAP SERPL CALC-SCNC: 8 MMOL/L (ref 7–16)
AST SERPL-CCNC: 25 U/L (ref 12–45)
BILIRUB SERPL-MCNC: 0.4 MG/DL (ref 0.1–1.5)
BUN SERPL-MCNC: 12 MG/DL (ref 8–22)
CALCIUM ALBUM COR SERPL-MCNC: 8.8 MG/DL (ref 8.5–10.5)
CALCIUM SERPL-MCNC: 9 MG/DL (ref 8.5–10.5)
CHLORIDE SERPL-SCNC: 102 MMOL/L (ref 96–112)
CHOLEST SERPL-MCNC: 186 MG/DL (ref 100–199)
CO2 SERPL-SCNC: 27 MMOL/L (ref 20–33)
CREAT SERPL-MCNC: 0.69 MG/DL (ref 0.5–1.4)
ERYTHROCYTE [DISTWIDTH] IN BLOOD BY AUTOMATED COUNT: 40.5 FL (ref 35.9–50)
GFR SERPLBLD CREATININE-BSD FMLA CKD-EPI: 124 ML/MIN/1.73 M 2
GLOBULIN SER CALC-MCNC: 3 G/DL (ref 1.9–3.5)
GLUCOSE SERPL-MCNC: 94 MG/DL (ref 65–99)
HCT VFR BLD AUTO: 48.7 % (ref 42–52)
HDLC SERPL-MCNC: 48 MG/DL
HGB BLD-MCNC: 15.2 G/DL (ref 14–18)
LDLC SERPL CALC-MCNC: 115 MG/DL
MCH RBC QN AUTO: 23.3 PG (ref 27–33)
MCHC RBC AUTO-ENTMCNC: 31.2 G/DL (ref 32.3–36.5)
MCV RBC AUTO: 74.8 FL (ref 81.4–97.8)
PLATELET # BLD AUTO: 259 K/UL (ref 164–446)
PMV BLD AUTO: 11.2 FL (ref 9–12.9)
POTASSIUM SERPL-SCNC: 4.6 MMOL/L (ref 3.6–5.5)
PROT SERPL-MCNC: 7.3 G/DL (ref 6–8.2)
RBC # BLD AUTO: 6.51 M/UL (ref 4.7–6.1)
SODIUM SERPL-SCNC: 137 MMOL/L (ref 135–145)
TRIGL SERPL-MCNC: 117 MG/DL (ref 0–149)
WBC # BLD AUTO: 5.5 K/UL (ref 4.8–10.8)

## 2025-01-03 PROCEDURE — 85027 COMPLETE CBC AUTOMATED: CPT

## 2025-01-03 PROCEDURE — 36415 COLL VENOUS BLD VENIPUNCTURE: CPT

## 2025-01-03 PROCEDURE — 80053 COMPREHEN METABOLIC PANEL: CPT

## 2025-01-03 PROCEDURE — 80061 LIPID PANEL: CPT

## 2025-01-24 ENCOUNTER — ANTICOAGULATION VISIT (OUTPATIENT)
Dept: VASCULAR LAB | Facility: MEDICAL CENTER | Age: 35
End: 2025-01-24
Attending: INTERNAL MEDICINE
Payer: COMMERCIAL

## 2025-01-24 DIAGNOSIS — Z95.2 S/P AVR (AORTIC VALVE REPLACEMENT): ICD-10-CM

## 2025-01-24 LAB — INR PPP: 1.5 (ref 2–3.5)

## 2025-01-24 PROCEDURE — 99211 OFF/OP EST MAY X REQ PHY/QHP: CPT | Performed by: PHARMACIST

## 2025-01-24 PROCEDURE — 85610 PROTHROMBIN TIME: CPT

## 2025-01-24 NOTE — PROGRESS NOTES
Anticoagulation Summary  As of 2025      INR goal:  1.5-2.0   TTR:  49.2% (10.5 mo)   INR used for dosin.50 (2025)   Warfarin maintenance plan:  10 mg (10 mg x 1) every day   Weekly warfarin total:  70 mg   Plan last modified:  Tricia Walter A.P.NAlysha (2024)   Next INR check:  2025   Priority:  Routine   Target end date:  Indefinite    Indications    S/P AVR (aortic valve replacement) [Z95.2]                 Anticoagulation Episode Summary       INR check location:  --    Preferred lab:  --    Send INR reminders to:  --    Comments:  On-X Aortic Valve Replacement - lower goal to 1.5 to 2.0 around 24 (MD authorized lower INR range see note 24)          Anticoagulation Care Providers       Provider Role Specialty Phone number    [facility]   [phone]                  Refer to Patient Findings for HPI:  Patient Findings       Negatives:  Signs/symptoms of thrombosis, Signs/symptoms of bleeding, Laboratory test error suspected, Change in health, Change in alcohol use, Change in activity, Upcoming invasive procedure, Emergency department visit, Upcoming dental procedure, Missed doses, Extra doses, Change in medications, Change in diet/appetite, Hospital admission, Bruising, Other complaints            There were no vitals filed for this visit.  (Taken if pt amenable)    Verified current warfarin dosing schedule.    Medications reconciled: Yes  Pt is on ASA 81 mg once daily as antiplatelet therapy for hx of AVR and must be reviewed again on cards f/u.      A/P   INR is therapeutic    Warfarin dosing recommendation: Instructed pt to continue on with current regimen.    Pt educated to contact our clinic with any changes in medications or s/s of bleeding or thrombosis. Pt is aware to seek immediate medical attention for falls, head injury or deep cuts.    Request pt to return in 4 week(s). Pt agrees.    Jordy Silver, PharmD, BCACP

## 2025-02-04 NOTE — ANESTHESIA PROCEDURE NOTES
Airway    Date/Time: 2/27/2024 7:32 AM    Performed by: Eric Lozano M.D.  Authorized by: Eric Lozano M.D.    Location:  OR  Urgency:  Elective  Indications for Airway Management:  Anesthesia      Spontaneous Ventilation: absent    Sedation Level:  Deep  Preoxygenated: Yes    Patient Position:  Sniffing  Mask Difficulty Assessment:  0 - not attempted  Final Airway Type:  Endotracheal airway  Final Endotracheal Airway:  ETT  Cuffed: Yes    Technique Used for Successful ETT Placement:  Video laryngoscopy  Devices/Methods Used in Placement:  Intubating stylet    Insertion Site:  Oral  Blade Type:  Glide  Laryngoscope Blade/Videolaryngoscope Blade Size:  4  ETT Size (mm):  7.0  Leak Pressue (cm H2O):  13  Measured from:  Teeth  ETT to Teeth (cm):  22  Placement Verified by: auscultation and capnometry    Cormack-Lehane Classification:  Grade I - full view of glottis  Number of Attempts at Approach:  1  Number of Other Approaches Attempted:  2  Unsuccessful Approach(es) for ETT:  Video laryngoscopy   8.0 ETT attempted. Trachea unable to accommodate. Changed to 7.0 ETT, passed easily. + leak <70cqE02. Low cuff pressure.         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 Patient was recently diagnosed with Diffuse large B cell Lymphoma on pathology of right groin mass and is following with [facility] oncology team. Patient was discharged on 01.17.2025 from hospital after inpatient chemotherapy (Completed cycle 1 of R-EPOCH (D1 on 1/11/2024). Tolerated well. Rituximab was given on D5).   Patient started to get a productive cough with yellow sputum production. He was admitted to SDU for  Acute Hypoxic Respiratory Failure 2/2 Multifocal Pneumonia, Volume Overload, Neutropenic fever, Influenza A infection, decompensated and was upgraded to ICU on 1/27,  started on BIPAP and weaned to HFNC. Received tamiflu course + course of azithromycin+cefepime. Currently being monitored off Abx by ID.    Pt clinically improved and was  downgrade back  to SDU. Here he was weaned off HFNC onto NC. ENT was consulted for black lesions on the base of tongue. They rec CT neck with IV contrast to investigate further. Follow up CT w/ IV contrast read, heme onc for any further management of large B celllymphoma with concern for rodriguez trnasformation and if ok to transition from heparin to xarelto or eliquis regarding his a-fib.    A/P   # Acute Hypoxic Respiratory Failure 2/2 Multifocal Pneumonia/ Volume Overload/ Acute on chronic diastolic CHF/  Influenza A infection/ RASHAD/ OHS   - clinically improving, on high low oxygen for now, will taper off as tolerated   - NIV PRN and QHS   - nebs Q 6 hours, supportive care, aspiration precautions   - CTA 1/20: Negative for pulmonary emboli. 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 Tolerated well. Rituximab was given on D5   - Uric acid 10.3 on 1/16/25, s/p 3 mg IV rasburicase  - sp Zarxio  - daily CBC with diff, active T&S  - c/w Allopurinol     # KRISTINE on CKD 3B / Chronic Lower Extremity Edema   - baseline ~high 1s, low 2s  - c/w diuretics   -nephrology is following, recommendations noted  - monitor BUN/cr and urine output  - c/w Lynette for now  - avoid nephrotoxic medications     # CAD s/p CABG/ Chronic Afib on Xarelto/ HTN  - c/w ASA, statin, BB  - Still holding lisinopril and aldactone from last admission  - holding Xarelto , on heparin drip  - telemetry monitoring, monitor and replete electrolytes      #HLD   - on Atorvastatin now   - resume Repatha on discharge    # DM  - CC diet   - FS - self monitored via dex com  - On insulin pump @2.85 at home   - Endo consult: keep off insulin pump  - lantus 10 lispro 3    # Oral pain due to ulcer with black discoloration   - c/w local cou care  - consult ENT to examine black discoloration -> rec CT neck w/ IV contrast  - oncology follow up ( pt likely developed mucositis)     # Agitation/ confusion Insomnia   - pt is calm now   - on Haldol PRN , Sertraline and melatonin   - supportive care     # GI  - on bowerl regimen and PPIs transfer to: floors  transfer from: CEU    HPI:67-year-old man with extensive medical history including CAD (status post CABG;), DM (on insulin pump), atrial fibrillation on Xarelto, 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 Patient was discharged on 01.17.2025 from hospital after inpatient chemotherapy (Completed cycle 1 of R-EPOCH (D1 on 1/11/2024). Tolerated well. Rituximab was given on D5).   Patient started to get a productive cough with yellow sputum production. He was admitted to SDU for  Acute Hypoxic Respiratory Failure 2/2 Multifocal Pneumonia, Volume Overload, Neutropenic fever, Influenza A infection, decompensated and was upgraded to ICU on 1/27,  started on BIPAP and weaned to HFNC. Received tamiflu course + course of azithromycin+cefepime. Currently being monitored off Abx by ID.    Pt clinically improved and was  downgrade back  to SDU. Here he was weaned off HFNC onto NC. ENT was consulted for black lesions on the base of tongue. They rec CT neck with IV contrast to investigate further. Follow up CT w/ IV contrast read, heme onc for any further management of large B celllymphoma with concern for rodriguez trnasformation and if ok to transition from heparin to xarelto or eliquis regarding his a-fib.    A/P   # Acute Hypoxic Respiratory Failure 2/2 Multifocal Pneumonia/ Volume Overload/ Acute on chronic diastolic CHF/  Influenza A infection/ RASHAD/ OHS   - clinically improving, on high low oxygen for now, will taper off as tolerated   - NIV PRN and QHS   - nebs Q 6 hours, supportive care, aspiration precautions   - CTA 1/20: Negative for pulmonary emboli. 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 Tolerated well. Rituximab was given on D5   - Uric acid 10.3 on 1/16/25, s/p 3 mg IV rasburicase  - sp Zarxio  - daily CBC with diff, active T&S  - c/w Allopurinol     # KRISTINE on CKD 3B / Chronic Lower Extremity Edema   - baseline ~high 1s, low 2s  - c/w diuretics   -nephrology is following, recommendations noted  - monitor BUN/cr and urine output  - c/w Lynette for now  - avoid nephrotoxic medications     # CAD s/p CABG/ Chronic Afib on Xarelto/ HTN  - c/w ASA, statin, BB  - Still holding lisinopril and aldactone from last admission  - holding Xarelto , on heparin drip  - telemetry monitoring, monitor and replete electrolytes      #HLD   - on Atorvastatin now   - resume Repatha on discharge    # DM  - CC diet   - FS - self monitored via dex com  - On insulin pump @2.85 at home   - Endo consult: keep off insulin pump  - lantus 10 lispro 3    # Oral pain due to ulcer with black discoloration   - c/w local couth care  - consult ENT to examine black discoloration -> rec CT neck w/ IV contrast, magic mouthwash  - oncology thinking this is mucositis     # Agitation/ confusion Insomnia   - pt is calm now   - on Haldol PRN , Sertraline and melatonin   - supportive care     # GI  - on bowerl regimen and PPIs

## 2025-02-21 ENCOUNTER — ANTICOAGULATION VISIT (OUTPATIENT)
Dept: VASCULAR LAB | Facility: MEDICAL CENTER | Age: 35
End: 2025-02-21
Attending: INTERNAL MEDICINE
Payer: COMMERCIAL

## 2025-02-21 VITALS — SYSTOLIC BLOOD PRESSURE: 136 MMHG | DIASTOLIC BLOOD PRESSURE: 78 MMHG | HEART RATE: 57 BPM

## 2025-02-21 DIAGNOSIS — Z95.2 S/P AVR (AORTIC VALVE REPLACEMENT): ICD-10-CM

## 2025-02-21 LAB — INR PPP: 1.4 (ref 2–3.5)

## 2025-02-21 PROCEDURE — 99999 PR NO CHARGE: CPT | Performed by: INTERNAL MEDICINE

## 2025-02-21 PROCEDURE — 85610 PROTHROMBIN TIME: CPT

## 2025-02-21 PROCEDURE — 99211 OFF/OP EST MAY X REQ PHY/QHP: CPT

## 2025-02-21 NOTE — PROGRESS NOTES
Anticoagulation Summary  As of 2025      INR goal:  1.5-2.0   TTR:  45.1% (11.4 mo)   INR used for dosin.40 (2025)   Warfarin maintenance plan:  10 mg (10 mg x 1) every day   Weekly warfarin total:  70 mg   Plan last modified:  KERNO RendonPAlyshaNAlysha (2024)   Next INR check:  3/21/2025   Priority:  Routine   Target end date:  Indefinite    Indications    S/P AVR (aortic valve replacement) [Z95.2]                 Anticoagulation Episode Summary       INR check location:  --    Preferred lab:  --    Send INR reminders to:  --    Comments:  On-X Aortic Valve Replacement - lower goal to 1.5 to 2.0 around 24 (MD authorized lower INR range see note 24)          Anticoagulation Care Providers       Provider Role Specialty Phone number    [facility]   [phone]                Refer to Patient Findings for HPI:  Patient Findings       Negatives:  Signs/symptoms of thrombosis, Signs/symptoms of bleeding, Laboratory test error suspected, Change in health, Change in alcohol use, Change in activity, Upcoming invasive procedure, Emergency department visit, Upcoming dental procedure, Missed doses, Extra doses, Change in medications, Change in diet/appetite, Hospital admission, Bruising, Other complaints            Date Referral Placed: 24      Vitals:  Vitals:    25 0740   BP: 136/78   Pulse: (!) 57       Verified current warfarin dosing schedule.    Medications reconciled.  Pt is on ASA 81 mg once daily as antiplatelet therapy for hx of AVR       A/P   INR is slightly subtherapeutic - INR has been trending down  Reason(s) for out of range INR today: No obvious causes      Warfarin dosing recommendation: Bolus warfarin 15mg today, then Continue regimen as listed above.    Pt educated to contact our clinic with any changes in medications or s/s of bleeding or thrombosis. Pt is aware to seek immediate medical attention for falls, head injury or deep cuts.    Request pt  to return in 4 week(s). Pt agrees.    Ariella AriasD

## 2025-03-10 DIAGNOSIS — Z95.2 S/P AVR (AORTIC VALVE REPLACEMENT): ICD-10-CM

## 2025-03-14 ENCOUNTER — HOSPITAL ENCOUNTER (OUTPATIENT)
Dept: CARDIOLOGY | Facility: MEDICAL CENTER | Age: 35
End: 2025-03-14
Attending: INTERNAL MEDICINE
Payer: COMMERCIAL

## 2025-03-14 DIAGNOSIS — I77.819 AORTIC ECTASIA (HCC): ICD-10-CM

## 2025-03-14 DIAGNOSIS — Z95.2 S/P AVR (AORTIC VALVE REPLACEMENT): ICD-10-CM

## 2025-03-14 LAB
LV EJECT FRACT  99904: 65
LV EJECT FRACT MOD 2C 99903: 66.6
LV EJECT FRACT MOD 4C 99902: 63.56
LV EJECT FRACT MOD BP 99901: 66.81

## 2025-03-14 PROCEDURE — 93306 TTE W/DOPPLER COMPLETE: CPT

## 2025-03-14 PROCEDURE — 93306 TTE W/DOPPLER COMPLETE: CPT | Mod: 26 | Performed by: INTERNAL MEDICINE

## 2025-03-17 ENCOUNTER — DOCUMENTATION (OUTPATIENT)
Dept: VASCULAR LAB | Facility: MEDICAL CENTER | Age: 35
End: 2025-03-17
Payer: COMMERCIAL

## 2025-03-18 NOTE — PROGRESS NOTES
Echocardiogram with normally functioning mechanical AV  Normal EF  No identified associated ascending aneurysm on current study  Repeat echocardiogram in 2 years -March 2027    Will discuss with patient at follow-up visit  RN to update surveillance counter    Michael J. Bloch, MD  Vascular Care

## 2025-03-21 ENCOUNTER — ANTICOAGULATION VISIT (OUTPATIENT)
Dept: VASCULAR LAB | Facility: MEDICAL CENTER | Age: 35
End: 2025-03-21
Attending: INTERNAL MEDICINE
Payer: COMMERCIAL

## 2025-03-21 DIAGNOSIS — Z95.2 S/P AVR (AORTIC VALVE REPLACEMENT): ICD-10-CM

## 2025-03-21 LAB — INR PPP: 1.7 (ref 2–3.5)

## 2025-03-21 PROCEDURE — 99211 OFF/OP EST MAY X REQ PHY/QHP: CPT

## 2025-03-21 PROCEDURE — 85610 PROTHROMBIN TIME: CPT

## 2025-03-21 NOTE — PROGRESS NOTES
Anticoagulation Summary  As of 3/21/2025      INR goal:  1.5-2.0   TTR:  46.8% (1 y)   INR used for dosin.70 (3/21/2025)   Warfarin maintenance plan:  10 mg (10 mg x 1) every day   Weekly warfarin total:  70 mg   Plan last modified:  ALE Rendon (2024)   Next INR check:  2025   Priority:  Routine   Target end date:  Indefinite    Indications    S/P AVR (aortic valve replacement) [Z95.2]                 Anticoagulation Episode Summary       INR check location:  --    Preferred lab:  --    Send INR reminders to:  --    Comments:  On-X Aortic Valve Replacement - lower goal to 1.5 to 2.0 around 24 (MD authorized lower INR range see note 24)          Anticoagulation Care Providers       Provider Role Specialty Phone number    [facility]   [phone]          Refer to Patient Findings for HPI:  Patient Findings       Negatives:  Signs/symptoms of thrombosis, Signs/symptoms of bleeding, Laboratory test error suspected, Change in health, Change in alcohol use, Change in activity, Upcoming invasive procedure, Emergency department visit, Upcoming dental procedure, Missed doses, Extra doses, Change in medications, Change in diet/appetite, Hospital admission, Bruising, Other complaints          Date Referral Placed: 03/10/25    Vitals:  There were no vitals filed for this visit.  Pt declined vitals    Verified current warfarin dosing schedule.    Medications reconciled.  Pt is on antiplatelet therapy with ASA 81mg  for AVR.      A/P   INR is therapeutic  Reason(s) for out of range INR today: N/A      Warfarin dosing recommendation: Continue regimen as listed above.    Pt educated to contact our clinic with any changes in medications or s/s of bleeding or thrombosis. Pt is aware to seek immediate medical attention for falls, head injury or deep cuts.    Request pt to return in 4 week(s). Pt agrees.    Ishan KrishnanD

## 2025-04-18 ENCOUNTER — OFFICE VISIT (OUTPATIENT)
Dept: VASCULAR LAB | Facility: MEDICAL CENTER | Age: 35
End: 2025-04-18
Payer: COMMERCIAL

## 2025-04-18 VITALS
BODY MASS INDEX: 33.04 KG/M2 | HEIGHT: 71 IN | WEIGHT: 236 LBS | SYSTOLIC BLOOD PRESSURE: 149 MMHG | DIASTOLIC BLOOD PRESSURE: 91 MMHG | HEART RATE: 74 BPM

## 2025-04-18 DIAGNOSIS — Z86.718 HISTORY OF DVT IN ADULTHOOD: ICD-10-CM

## 2025-04-18 DIAGNOSIS — E78.5 DYSLIPIDEMIA: ICD-10-CM

## 2025-04-18 DIAGNOSIS — Z95.2 S/P AVR (AORTIC VALVE REPLACEMENT): ICD-10-CM

## 2025-04-18 DIAGNOSIS — I10 PRIMARY HYPERTENSION: ICD-10-CM

## 2025-04-18 DIAGNOSIS — D68.59 HYPERCOAGULABLE STATE (HCC): ICD-10-CM

## 2025-04-18 DIAGNOSIS — Q23.81 BICUSPID AORTIC VALVE: ICD-10-CM

## 2025-04-18 DIAGNOSIS — Z79.01 WARFARIN ANTICOAGULATION: ICD-10-CM

## 2025-04-18 DIAGNOSIS — I77.810 DILATED AORTIC ROOT (HCC): ICD-10-CM

## 2025-04-18 DIAGNOSIS — I77.819 AORTIC ECTASIA (HCC): ICD-10-CM

## 2025-04-18 PROCEDURE — 3077F SYST BP >= 140 MM HG: CPT

## 2025-04-18 PROCEDURE — 99212 OFFICE O/P EST SF 10 MIN: CPT

## 2025-04-18 PROCEDURE — 99214 OFFICE O/P EST MOD 30 MIN: CPT

## 2025-04-18 PROCEDURE — 3080F DIAST BP >= 90 MM HG: CPT

## 2025-04-18 ASSESSMENT — ENCOUNTER SYMPTOMS
BLOOD IN STOOL: 0
SHORTNESS OF BREATH: 0
SPEECH CHANGE: 0
FOCAL WEAKNESS: 0
PALPITATIONS: 0

## 2025-04-18 ASSESSMENT — FIBROSIS 4 INDEX: FIB4 SCORE: 0.61

## 2025-04-18 NOTE — PROGRESS NOTES
"VASCULAR MEDICINE CLINIC - Follow Up VISIT  04/18/2025    Umesh Chang is a 34 y.o. male  who has been referred for vascular medicine evaluation and management   Referring provider: CT surgery    Subjective      HPI:   Referred for e/m of bicuspid AV and thoracic aortic ectasia; no ascending aorta repair    Had SAVR in Feb 2024  Last seen 10/2024  Doing well, no concerns  Has regained about 20-25lbs since last seen  Thinks related to drinking more protein drinks  Still exercising,  and active at work   Home BP <130/80   He has no cardiovascular complaints  On aspirin and warfarin- no bleeding  Remain on metoprolol  Did labs and imaging - reviewed       ALLERGIES  Patient has no known allergies.    Social History     Tobacco Use    Smoking status: Never    Smokeless tobacco: Never   Vaping Use    Vaping status: Never Used   Substance Use Topics    Alcohol use: Yes     Comment: one beer every 3 weeks or so    Drug use: No     DIET AND EXERCISE:  Weight Change: lost weight after surgery; has regained much of it back  Diet: relatively HH; low in green leafy veg  Exercise: moderate regular exercise program- walking every day    Review of Systems   Constitutional:  Negative for malaise/fatigue.   Respiratory:  Negative for shortness of breath.    Cardiovascular:  Negative for chest pain, palpitations and leg swelling.   Gastrointestinal:  Negative for blood in stool.   Neurological:  Negative for speech change and focal weakness.          Objective    Vitals:    04/18/25 0848 04/18/25 0852   BP: (!) 143/90 (!) 149/91   BP Location: Left arm Left arm   Patient Position: Sitting Sitting   BP Cuff Size: Large adult Large adult   Pulse: 65 74   Weight: 107 kg (236 lb)    Height: 1.803 m (5' 11\")       BP Readings from Last 4 Encounters:   04/18/25 (!) 149/91   02/21/25 136/78   11/21/24 120/73   10/21/24 130/82      Body mass index is 32.92 kg/m².   Wt Readings from Last 4 Encounters:   04/18/25 107 kg (236 lb)   10/21/24 95.3 " kg (210 lb)   10/21/24 95.3 kg (210 lb)   04/29/24 104 kg (230 lb)      Physical Exam  Vitals reviewed.   Constitutional:       General: He is not in acute distress.     Appearance: He is not diaphoretic.   HENT:      Head: Normocephalic and atraumatic.   Eyes:      General: No scleral icterus.     Conjunctiva/sclera: Conjunctivae normal.   Neck:      Vascular: No carotid bruit.   Cardiovascular:      Rate and Rhythm: Normal rate and regular rhythm.      Heart sounds: Murmur heard.   Pulmonary:      Effort: Pulmonary effort is normal. No respiratory distress.      Breath sounds: Normal breath sounds. No wheezing or rales.   Musculoskeletal:         General: No swelling.      Right lower leg: No edema.      Left lower leg: No edema.   Skin:     General: Skin is warm.      Coloration: Skin is not pale.   Neurological:      General: No focal deficit present.      Mental Status: He is alert and oriented to person, place, and time.      Cranial Nerves: No cranial nerve deficit.      Coordination: Coordination normal.      Gait: Gait is intact. Gait normal.   Psychiatric:         Mood and Affect: Mood and affect normal.         Behavior: Behavior normal.        DATA REVIEW    Lab Results   Component Value Date/Time    CHOLSTRLTOT 186 01/03/2025 08:50 AM     (H) 01/03/2025 08:50 AM    HDL 48 01/03/2025 08:50 AM    TRIGLYCERIDE 117 01/03/2025 08:50 AM       Lab Results   Component Value Date/Time    SODIUM 137 01/03/2025 08:50 AM    POTASSIUM 4.6 01/03/2025 08:50 AM    CHLORIDE 102 01/03/2025 08:50 AM    CO2 27 01/03/2025 08:50 AM    GLUCOSE 94 01/03/2025 08:50 AM    BUN 12 01/03/2025 08:50 AM    CREATININE 0.69 01/03/2025 08:50 AM    CREATININE 0.9 10/12/2006 08:58 AM     Lab Results   Component Value Date/Time    ALKPHOSPHAT 51 01/03/2025 08:50 AM    ASTSGOT 25 01/03/2025 08:50 AM    ALTSGPT 29 01/03/2025 08:50 AM    TBILIRUBIN 0.4 01/03/2025 08:50 AM       Lab Results   Component Value Date/Time    HBA1C 5.3  02/23/2024 08:18 PM       Cardiovascular Imaging:    Multiple imaging studies available in EMR and reviewed with patient at todays visit     Echo 3/2025  Compared to the prior study on 02/27/24, interval aortic valve   replacement.  The left ventricular ejection fraction is visually estimated to be 60-  65%.  Known mechanical aortic valve that is functioning normally with normal   transvalvular gradients.  Vmax is 2.33 m/s.        Medical Decision Making:  Today's Assessment / Status / Plan:     1. Primary hypertension  Basic Metabolic Panel      2. Warfarin anticoagulation  CBC WITHOUT DIFFERENTIAL      3. Hypercoagulable state (HCC)        4. Dilated aortic root (HCC)        5. Aortic ectasia (HCC)        6. S/P AVR (aortic valve replacement)        7. History of DVT in adulthood        8. Bicuspid aortic valve        9. Dyslipidemia  Lipid Profile    APOLIPOPROTEIN B    Lipoprotein (a)           Etiology of Established CVD if Present:     1) BAV status post On-x mechanical valve February 2024  Patient doing well postoperatively  As we discussed, he will need lifelong surveillance to rule out associated aortic aneurysm  Echo 3/2025 = stable, normal mechanical valve, no TAA seen on imaging   -Echocardiogram February 2027 unless ordered sooner by cardiology  -Medical management lifestyle modification as per below  -Recommended screening echocardiogram for first-degree relatives    LIPID MANAGEMENT  Qualifies for Statin Therapy Based on 2018 ACC/AHA Guidelines: no  No evidence of atherosclerosis on preoperative evaluation  Lipid panel seems reasonable  Plan:   -Continue lifestyle modification - provided LDL handout  -Repeat lipid panel annually or per PCP, check Lp(a)    BLOOD PRESSURE MANAGEMENT  BP Goal ACC/AHA (2017) goal <130/80  Home BP at goal:  yes  Office BP at goal:  no, but has been at previous vis   Plan:   Monitoring:   - start/continue home BP monitoring, reviewed correct technique, provide BP log and  instructions  -Consider ABPM if continued evidence of possible whitecoat hypertension  Medications:  -Continue metoprolol 25 mg once daily     GLYCEMIC MANAGEMENT Normal  -Continue lifestyle modification    ANTITHROMBOTIC THERAPY   -Continue indefinite low-dose aspirin pending further recommendations from cardiology  -Continue indefinite warfarin but can decrease INR goal given presence of On-X valve in the future    LIFESTYLE INTERVENTIONS:    Tobacco Use: Never user  - continued complete avoidance of all tobacco products     Physical Activity: Continue his current walking.     Weight Management and/or Nutrition: Continue overall healthy eating plan with intermittent fasting helping reduce and maintain his weight.     OTHER:     -Seizure disorder -currently on Depakote.  He states his levels were checked recently and were in range.  Defer further management to neurology.    Studies to Be Obtained: Echocardiogram February 2027 if not ordered sooner by cardiology  Labs to Be Obtained: lipid, CMP, cbc, apob, Lp(a)    Follow up in: 12 months; sooner prn    CHAKA Campoverde   [facility]  [facility]  [phone]

## 2025-04-21 ENCOUNTER — APPOINTMENT (OUTPATIENT)
Dept: VASCULAR LAB | Facility: MEDICAL CENTER | Age: 35
End: 2025-04-21
Payer: COMMERCIAL

## 2025-04-22 ENCOUNTER — TELEPHONE (OUTPATIENT)
Dept: VASCULAR LAB | Facility: MEDICAL CENTER | Age: 35
End: 2025-04-22
Payer: COMMERCIAL

## 2025-04-22 DIAGNOSIS — Z95.2 S/P AVR (AORTIC VALVE REPLACEMENT): ICD-10-CM

## 2025-04-22 RX ORDER — WARFARIN SODIUM 10 MG/1
10 TABLET ORAL DAILY
Qty: 90 TABLET | Refills: 1 | Status: SHIPPED | OUTPATIENT
Start: 2025-04-22

## 2025-04-22 NOTE — TELEPHONE ENCOUNTER
Caller:  Umesh Chang    Topic/issue:   Umesh is needing refill on this:   Warfarin    Callback Number: [phone]    Thank you,   Nancy HAND

## 2025-04-30 ENCOUNTER — ANTICOAGULATION VISIT (OUTPATIENT)
Dept: VASCULAR LAB | Facility: MEDICAL CENTER | Age: 35
End: 2025-04-30
Attending: INTERNAL MEDICINE
Payer: COMMERCIAL

## 2025-04-30 VITALS — DIASTOLIC BLOOD PRESSURE: 98 MMHG | HEART RATE: 78 BPM | SYSTOLIC BLOOD PRESSURE: 158 MMHG

## 2025-04-30 DIAGNOSIS — Z95.2 S/P AVR (AORTIC VALVE REPLACEMENT): ICD-10-CM

## 2025-04-30 LAB — INR PPP: 1.9 (ref 2–3.5)

## 2025-04-30 PROCEDURE — 99211 OFF/OP EST MAY X REQ PHY/QHP: CPT | Performed by: NURSE PRACTITIONER

## 2025-04-30 PROCEDURE — 85610 PROTHROMBIN TIME: CPT

## 2025-04-30 NOTE — PROGRESS NOTES
Anticoagulation Summary  As of 2025      INR goal:  1.5-2.0   TTR:  51.9% (1.1 y)   INR used for dosin.90 (2025)   Warfarin maintenance plan:  10 mg (10 mg x 1) every day   Weekly warfarin total:  70 mg   Plan last modified:  ALE Rendon (2024)   Next INR check:  2025   Priority:  Routine   Target end date:  Indefinite    Indications    S/P AVR (aortic valve replacement) [Z95.2]                 Anticoagulation Episode Summary       INR check location:  --    Preferred lab:  --    Send INR reminders to:  --    Comments:  On-X Aortic Valve Replacement - lower goal to 1.5 to 2.0 around 24 (MD authorized lower INR range see note 24)          Anticoagulation Care Providers       Provider Role Specialty Phone number    [facility]   [phone]                Refer to Patient Findings for HPI:  Patient Findings       Negatives:  Signs/symptoms of thrombosis, Signs/symptoms of bleeding, Laboratory test error suspected, Change in health, Change in alcohol use, Change in activity, Upcoming invasive procedure, Emergency department visit, Upcoming dental procedure, Missed doses, Extra doses, Change in medications, Change in diet/appetite, Hospital admission, Bruising, Other complaints          Clinical Outcomes       Negatives:  Major bleeding event, Thromboembolic event, Anticoagulation-related hospital admission, Anticoagulation-related ED visit, Anticoagulation-related fatality            Date Referral Placed: 3/10/25      Vitals:  Vitals:    25 0756   BP: (!) 158/98   Pulse: 78   BP elevated in clinic today. He is asymptomatic. He is checking BPs at home at least once a day and recording. He reports BPs at home that are occasionally high. Recommend he recheck his BP and if it continues to trend high, to discuss with his providers.     Verified current warfarin dosing schedule.    Medications reconciled.  Pt is on antiplatelet therapy with ASA 81 mg   for [facility] AVR.      A/P   INR is therapeutic  Reason(s) for out of range INR today: N/A      Warfarin dosing recommendation: Continue regimen as listed above.    Pt educated to contact our clinic with any changes in medications or s/s of bleeding or thrombosis. Pt is aware to seek immediate medical attention for falls, head injury or deep cuts.    Request pt to return in 5 week(s). Pt agrees.    FLAKITO Rendon.

## 2025-06-09 ENCOUNTER — ANTICOAGULATION VISIT (OUTPATIENT)
Dept: VASCULAR LAB | Facility: MEDICAL CENTER | Age: 35
End: 2025-06-09
Attending: INTERNAL MEDICINE
Payer: COMMERCIAL

## 2025-06-09 DIAGNOSIS — Z95.2 S/P AVR (AORTIC VALVE REPLACEMENT): Primary | ICD-10-CM

## 2025-06-09 LAB — INR PPP: 1.5 (ref 2–3.5)

## 2025-06-09 PROCEDURE — 85610 PROTHROMBIN TIME: CPT

## 2025-06-09 PROCEDURE — 99211 OFF/OP EST MAY X REQ PHY/QHP: CPT

## 2025-06-09 NOTE — PROGRESS NOTES
Anticoagulation Summary  As of 2025      INR goal:  1.5-2.0   TTR:  56.2% (1.2 y)   INR used for dosin.50 (2025)   Warfarin maintenance plan:  10 mg (10 mg x 1) every day   Weekly warfarin total:  70 mg   Plan last modified:  KERON RendonPKAITE (2024)   Next INR check:  7/15/2025   Priority:  Routine   Target end date:  Indefinite    Indications    S/P AVR (aortic valve replacement) [Z95.2]                 Anticoagulation Episode Summary       INR check location:  --    Preferred lab:  --    Send INR reminders to:  --    Comments:  On-X Aortic Valve Replacement - lower goal to 1.5 to 2.0 around 24 (MD authorized lower INR range see note 24)          Anticoagulation Care Providers       Provider Role Specialty Phone number    [facility]   [phone]        Refer to Patient Findings for HPI:  Patient Findings       Negatives:  Signs/symptoms of thrombosis, Signs/symptoms of bleeding, Laboratory test error suspected, Change in health, Change in alcohol use, Change in activity, Upcoming invasive procedure, Emergency department visit, Upcoming dental procedure, Missed doses, Extra doses, Change in medications, Change in diet/appetite, Hospital admission, Bruising, Other complaints          Clinical Outcomes       Negatives:  Major bleeding event, Thromboembolic event, Anticoagulation-related hospital admission, Anticoagulation-related ED visit, Anticoagulation-related fatality        Date Referral Placed: 3/10/25    Vitals:  There were no vitals filed for this visit.  Pt declined vitals    Verified current warfarin dosing schedule.    Medications reconciled.  Pt is on antiplatelet therapy with ASA 81mg QD for AVR.      A/P   INR is therapeutic  Reason(s) for out of range INR today: N/A        Warfarin dosing recommendation: Continue regimen as listed above.    Pt educated to contact our clinic with any changes in medications or s/s of bleeding or thrombosis. Pt is  aware to seek immediate medical attention for falls, head injury or deep cuts.    Request pt to return in 5 week(s). Pt agrees.    Ishan KrishnanD

## 2025-07-15 ENCOUNTER — ANTICOAGULATION VISIT (OUTPATIENT)
Dept: VASCULAR LAB | Facility: MEDICAL CENTER | Age: 35
End: 2025-07-15
Attending: INTERNAL MEDICINE
Payer: COMMERCIAL

## 2025-07-15 DIAGNOSIS — Z95.2 S/P AVR (AORTIC VALVE REPLACEMENT): Primary | ICD-10-CM

## 2025-07-15 LAB — INR PPP: 2.1 (ref 2–3.5)

## 2025-07-15 PROCEDURE — 99212 OFFICE O/P EST SF 10 MIN: CPT

## 2025-07-15 PROCEDURE — 85610 PROTHROMBIN TIME: CPT

## 2025-07-15 NOTE — PROGRESS NOTES
Anticoagulation Summary  As of 7/15/2025      INR goal:  1.5-2.0   TTR:  58.2% (1.3 y)   INR used for dosin.10 (7/15/2025)   Warfarin maintenance plan:  10 mg (10 mg x 1) every day   Weekly warfarin total:  70 mg   Plan last modified:  ALE Rendon (2024)   Next INR check:  2025   Priority:  Routine   Target end date:  Indefinite    Indications    S/P AVR (aortic valve replacement) [Z95.2]                 Anticoagulation Episode Summary       INR check location:  --    Preferred lab:  --    Send INR reminders to:  --    Comments:  On-X Aortic Valve Replacement - lower goal to 1.5 to 2.0 around 24 (MD authorized lower INR range see note 24)          Anticoagulation Care Providers       Provider Role Specialty Phone number    [facility]   [phone]                Refer to Patient Findings for HPI:  Patient Findings       Negatives:  Signs/symptoms of thrombosis, Signs/symptoms of bleeding, Laboratory test error suspected, Change in health, Change in alcohol use, Change in activity, Upcoming invasive procedure, Emergency department visit, Upcoming dental procedure, Missed doses, Extra doses, Change in medications, Change in diet/appetite, Hospital admission, Bruising, Other complaints          Clinical Outcomes       Negatives:  Major bleeding event, Thromboembolic event, Anticoagulation-related hospital admission, Anticoagulation-related ED visit, Anticoagulation-related fatality            Date Referral Placed: 03/10/25      Vitals:  There were no vitals filed for this visit.  Pt declined vitals    Verified current warfarin dosing schedule.    Medications reconciled.  Pt is on antiplatelet therapy with ASA 81mg QD for AVR.       A/P   INR is slightly supratherapeutic but within reasonable variance  Reason(s) for out of range INR today: No obvious causes      Warfarin dosing recommendation: Continue regimen as listed above.    Pt educated to contact our clinic  with any changes in medications or s/s of bleeding or thrombosis. Pt is aware to seek immediate medical attention for falls, head injury or deep cuts.    Request pt to return in 5 week(s). Pt agrees.    Kishore Hernandes, AriellaD

## 2025-08-19 ENCOUNTER — ANTICOAGULATION VISIT (OUTPATIENT)
Dept: VASCULAR LAB | Facility: MEDICAL CENTER | Age: 35
End: 2025-08-19
Attending: INTERNAL MEDICINE
Payer: COMMERCIAL

## 2025-08-19 DIAGNOSIS — Z95.2 S/P AVR (AORTIC VALVE REPLACEMENT): Primary | ICD-10-CM

## 2025-08-19 LAB — INR PPP: 1.5 (ref 2–3.5)

## 2025-08-19 PROCEDURE — 99213 OFFICE O/P EST LOW 20 MIN: CPT | Performed by: PHARMACIST

## 2025-08-19 PROCEDURE — 85610 PROTHROMBIN TIME: CPT

## (undated) DEVICE — SUTURE 4-0 30CM STRATAFIX SPIRAL PS-2 (12EA/BX)

## (undated) DEVICE — SUTURE 4-0 PROLENE RB-1 D/A 36 (36PK/BX)"

## (undated) DEVICE — SODIUM CHL IRRIGATION 0.9% 1000ML (12EA/CA)

## (undated) DEVICE — QUICKLOADS COR-KNOT TITANIUM - (12/BX)

## (undated) DEVICE — CATHETER BALLOON DILATOR 12-15MM 8CM F/G

## (undated) DEVICE — TUBING CLEARLINK DUO-VENT - C-FLO (48EA/CA)

## (undated) DEVICE — HEAD HOLDER JUNIOR/ADULT

## (undated) DEVICE — KIT RADIAL ARTERY 20GA W/MAX BARRIER AND BIOPATCH  (5EA/CA) #10740 IS FOR THE SET RADIAL ARTERIAL

## (undated) DEVICE — GLOVE SZ 7.5 BIOGEL PI MICRO - PF LF (50PR/BX)

## (undated) DEVICE — SYS DLV COST CLS RM TEMP - INJECTATE (CO-SET II) (10EA/CA)

## (undated) DEVICE — CATHETER IV 20 GA X 1-1/4 ---SURG.& SDS ONLY--- (50EA/BX)

## (undated) DEVICE — PROTECTOR ULNA NERVE - (36PR/CA)

## (undated) DEVICE — SET LEADWIRE 5 LEAD BEDSIDE DISPOSABLE ECG (1SET OF 5/EA)

## (undated) DEVICE — WIRE STEEL 5-0 B&S 20 OHS - 5/PK 12PK/BX ITEM. D5329 OR D6625 CAN BE USED AS A SUB

## (undated) DEVICE — KIT  I.V. START (100EA/CA)

## (undated) DEVICE — SENSOR OXIMETER ADULT SPO2 RD SET (20EA/BX)

## (undated) DEVICE — KIT ANESTHESIA W/CIRCUIT & 3/LT BAG W/FILTER (20EA/CA)

## (undated) DEVICE — CATHETER SUCTION 14 FR. WITH CONTROL PORT (100/CS)

## (undated) DEVICE — CANISTER SUCTION 3000ML MECHANICAL FILTER AUTO SHUTOFF MEDI-VAC NONSTERILE LF DISP  (40EA/CA)

## (undated) DEVICE — TUBE CONNECTING SUCTION - CLEAR PLASTIC STERILE 72 IN (50EA/CA)

## (undated) DEVICE — BALLOON DILATION FIXED WIRE 15-16.5-18 240CM (5/BX)

## (undated) DEVICE — TEETHGUARD ENT -2BX MIN ORDER- (6EA/BX)

## (undated) DEVICE — INSERT STEALTH #3 - (10/BX)

## (undated) DEVICE — SET EXTENSION WITH 2 PORTS (48EA/CA) ***PART #2C8610 IS A SUBSTITUTE*****

## (undated) DEVICE — CIRCUIT VENTILATOR PEDIATRIC WITH FILTER  (20EA/CS)

## (undated) DEVICE — SLEEVE VASO CALF MED - (10PR/CA)

## (undated) DEVICE — ELECTRODE DUAL RETURN W/ CORD - (50/PK)

## (undated) DEVICE — PATTIES SURG NEURO X-RAY 1/2X1/2 (10EA/PK 20PK/CS)

## (undated) DEVICE — POLY UMBILICAL TAPE 1/8X30 - (36/BX)

## (undated) DEVICE — SUTURE 5-0 PROLENE RB-1 D/A 36 (36PK/BX)"

## (undated) DEVICE — SYRINGE EAR/NOSE 3 OZ STERILE (50/CA

## (undated) DEVICE — PACK CV DRAPING/BASIN 2PART - (1/CA)

## (undated) DEVICE — SUCTION INSTRUMENT YANKAUER BULBOUS TIP W/O VENT (50EA/CA)

## (undated) DEVICE — CANISTER SUCTION RIGID RED 1500CC (40EA/CA)

## (undated) DEVICE — QUICKLOADS COR-KNOT TITANIUM - (6EA/PK 12PK/BX)

## (undated) DEVICE — ELECTRODE 850 FOAM ADHESIVE - HYDROGEL RADIOTRNSPRNT (50/PK)

## (undated) DEVICE — FIBRILLAR SURGICEL 4X4 - 10/CA

## (undated) DEVICE — BALLOON CRE WG 15-18MM 240CM 5.5 F G

## (undated) DEVICE — SUTURE 2-0 ETHIBOND V-5 30 (12EA/BX)"

## (undated) DEVICE — SENSOR CEREBRAL AND SOMATIC MONITORING (20/CA)

## (undated) DEVICE — NEPTUNE 4 PORT MANIFOLD - (20/PK)

## (undated) DEVICE — TRAY SURESTEP FOLEY TEMP SENSING 16FR (10EA/CA) ORDER  #18764 FOR TEMP FOLEY ONLY

## (undated) DEVICE — LACTATED RINGERS INJ 1000 ML - (14EA/CA 60CA/PF)

## (undated) DEVICE — GOWN WARMING STANDARD FLEX - (30/CA)

## (undated) DEVICE — SET BIFURCATED BLOOD - (48EA/CS)

## (undated) DEVICE — WATER IRRIGATION STERILE 1000ML (12EA/CA)

## (undated) DEVICE — BAG RESUSCITATION DISPOSABLE - WITH MASK (10 EA/CA)

## (undated) DEVICE — PAD LAP STERILE 18 X 18 - (5/PK 40PK/CA)

## (undated) DEVICE — MASK ANESTHESIA ADULT  - (100/CA)

## (undated) DEVICE — SUTURE OHS

## (undated) DEVICE — KIT INTROPERCUTANEOUS SHEATH - 8.5 FR W/MAX BARRIER AND BIOPATCH  (5/CA)

## (undated) DEVICE — TRAY MULTI-LUMEN 7FR PRESSURE W/MAX BARRIER AND BIOPATCH - (5/CA)

## (undated) DEVICE — STOPCOCK MALE 4-WAY - (50/CA)

## (undated) DEVICE — ORGANIZER SUTURE GABBAY-FRAT - ER STERILE (3/SET 4ST/BX)

## (undated) DEVICE — SODIUM CHL. INJ. 0.9% 500ML (24EA/CA 50CA/PF)

## (undated) DEVICE — PACK ENT OR - (2EA/CA)

## (undated) DEVICE — TUBE CHEST 32FR. STRAIGHT - (10EA/CA)

## (undated) DEVICE — SLEEVE, VASO, THIGH, MED

## (undated) DEVICE — COVER LIGHT HANDLE ALC PLUS DISP (18EA/BX)

## (undated) DEVICE — SENSOR SPO2 NEO LNCS ADHESIVE (20/BX) SEE USER NOTES

## (undated) DEVICE — BALLOON CRE WG 12-15MM 240CM 5.5 F G

## (undated) DEVICE — GLOVE BIOGEL SZ 7.5 SURGICAL PF LTX - (50PR/BX 4BX/CA)

## (undated) DEVICE — SYSTEM CHEST DRAIN ADULT/PEDS W/AUTO TRANSFUSION CAPABILITY SAHARA (6EA/CA)

## (undated) DEVICE — SET TUBING PNEUMOCLEAR HIGH FLOW SMOKE EVACUATION (10EA/BX)

## (undated) DEVICE — PROBE LARYNGEAL ADULT  W/.33MM SCALPET TIP

## (undated) DEVICE — STOPCOCK IV 400 PSI 3W ROT (50EA/BX)

## (undated) DEVICE — SET FLUID WARMING STANDARD FLOW - (10/CA)

## (undated) DEVICE — MICRODRIP PRIMARY VENTED 60 (48EA/CA) THIS WAS PART #2C8428 WHICH WAS DISCONTINUED

## (undated) DEVICE — SUTURE GENERAL

## (undated) DEVICE — GLOVE BIOGEL PI ORTHO SZ 7 PF LF (40PR/BX)

## (undated) DEVICE — BLADE SURGICAL #15 - (50/BX 3BX/CA)

## (undated) DEVICE — WAX BONE ALKYLENE OXIDE HEMOSTASIS OSTENE 3.5GM (10EA/CA)

## (undated) DEVICE — BLADE STERNUM SAW SURGICAL 32.0 X 6.4 MM STERILE (1/EA)

## (undated) DEVICE — TUBE E-T HI-LO CUFF 8.0MM (10EA/PK)

## (undated) DEVICE — LEAD PACING TEMP MYO - (12/BX)

## (undated) DEVICE — TUBE CHEST 32FR. RIGHT ANGLED (10EA/CA)

## (undated) DEVICE — D-5-W INJ. 500 ML - (24EA/CA)

## (undated) DEVICE — GVL 4 STAT DISPOSABLE - (10/BX)

## (undated) DEVICE — FILTER BLOOD TRANSFUSION - (40/CA) (PALL)

## (undated) DEVICE — ANTI-FOG SOLUTION - 60BTL/CA

## (undated) DEVICE — TRANSDUCER BIFURCATED MONITORING KIT (10EA/CA)

## (undated) DEVICE — HEMOSTAT SURG ABSORBABLE - 4 X 8 IN SURGICEL (24EA/CA)

## (undated) DEVICE — CHLORAPREP 26 ML APPLICATOR - ORANGE TINT(25/CA)

## (undated) DEVICE — SYRINGE ALLIANCE INFLATION (5EA/BX)

## (undated) DEVICE — INSERT STEALTH #5 - (10/BX)

## (undated) DEVICE — CATHETER THERMALDILUTION SWAN - (5EA/CA)

## (undated) DEVICE — SUTURE 3-0 SILK SH C/R 18 IN - (12/BX)

## (undated) DEVICE — DEVICE KIT COR-KNOT COMBO2 DEVICES & 12 KNOTS PER KIT (6KT/CA)

## (undated) DEVICE — TOWEL STOP TIMEOUT SAFETY FLAG (40EA/CA)

## (undated) DEVICE — PTFE PLED STER - (250/CA)

## (undated) DEVICE — SUTURE 5 SURGICAL STEEL V-40 - (12/BX) CCS CURRENT

## (undated) DEVICE — SUTURE 3-0 PROLENE SH 36 (36PK/BX)"

## (undated) DEVICE — NEEDLE WILLIAMS CYSTOSCOPIC - INJ.

## (undated) DEVICE — SOD. CHL. INJ. 0.9% 1000 ML - (14EA/CA 60CA/PF)